# Patient Record
Sex: FEMALE | Race: WHITE | Employment: OTHER | URBAN - METROPOLITAN AREA
[De-identification: names, ages, dates, MRNs, and addresses within clinical notes are randomized per-mention and may not be internally consistent; named-entity substitution may affect disease eponyms.]

---

## 2017-01-17 DIAGNOSIS — G62.9 NEUROPATHY: Primary | ICD-10-CM

## 2017-01-17 RX ORDER — GABAPENTIN 600 MG/1
TABLET ORAL
Qty: 90 TABLET | Refills: 2 | Status: SHIPPED | OUTPATIENT
Start: 2017-01-17 | End: 2017-06-12

## 2017-01-17 NOTE — TELEPHONE ENCOUNTER
Medication: Gabapentin 600mg    Date of last refill: 9/29/2016 (#90/2)  [place]    Last office visit: 11/22/2016  Due back to clinic per last office note:  3-6 months  Date next office visit scheduled:  Future Appoin

## 2017-03-06 PROBLEM — I47.19 ATRIAL TACHYCARDIA (HCC): Status: ACTIVE | Noted: 2017-03-06

## 2017-03-06 PROBLEM — I47.1 ATRIAL TACHYCARDIA (HCC): Status: ACTIVE | Noted: 2017-03-06

## 2017-03-06 PROBLEM — I47.19 ATRIAL TACHYCARDIA: Status: ACTIVE | Noted: 2017-03-06

## 2017-03-10 ENCOUNTER — OFFICE VISIT (OUTPATIENT)
Dept: INTERNAL MEDICINE CLINIC | Facility: CLINIC | Age: 82
End: 2017-03-10

## 2017-03-10 VITALS
SYSTOLIC BLOOD PRESSURE: 132 MMHG | BODY MASS INDEX: 25.61 KG/M2 | TEMPERATURE: 98 F | DIASTOLIC BLOOD PRESSURE: 70 MMHG | HEIGHT: 68 IN | HEART RATE: 68 BPM | RESPIRATION RATE: 16 BRPM | OXYGEN SATURATION: 94 % | WEIGHT: 169 LBS

## 2017-03-10 DIAGNOSIS — E89.0 HISTORY OF PARTIAL THYROIDECTOMY: ICD-10-CM

## 2017-03-10 DIAGNOSIS — E53.8 B12 DEFICIENCY: ICD-10-CM

## 2017-03-10 DIAGNOSIS — I10 ESSENTIAL HYPERTENSION: ICD-10-CM

## 2017-03-10 DIAGNOSIS — M54.16 LUMBAR RADICULAR PAIN: ICD-10-CM

## 2017-03-10 DIAGNOSIS — G63 NEUROPATHY ASSOCIATED WITH LYMPHOMA (HCC): Primary | ICD-10-CM

## 2017-03-10 DIAGNOSIS — C85.90 NEUROPATHY ASSOCIATED WITH LYMPHOMA (HCC): Primary | ICD-10-CM

## 2017-03-10 DIAGNOSIS — F51.01 PRIMARY INSOMNIA: ICD-10-CM

## 2017-03-10 DIAGNOSIS — M75.121 COMPLETE TEAR OF RIGHT ROTATOR CUFF: ICD-10-CM

## 2017-03-10 DIAGNOSIS — R73.01 IFG (IMPAIRED FASTING GLUCOSE): ICD-10-CM

## 2017-03-10 DIAGNOSIS — M75.41 SHOULDER IMPINGEMENT, RIGHT: ICD-10-CM

## 2017-03-10 DIAGNOSIS — R63.4 WEIGHT LOSS, ABNORMAL: ICD-10-CM

## 2017-03-10 PROBLEM — I47.19 ATRIAL TACHYCARDIA (HCC): Status: RESOLVED | Noted: 2017-03-06 | Resolved: 2017-03-10

## 2017-03-10 PROBLEM — I47.1 ATRIAL TACHYCARDIA (HCC): Status: RESOLVED | Noted: 2017-03-06 | Resolved: 2017-03-10

## 2017-03-10 PROBLEM — I47.19 ATRIAL TACHYCARDIA: Status: RESOLVED | Noted: 2017-03-06 | Resolved: 2017-03-10

## 2017-03-10 LAB
ALBUMIN SERPL-MCNC: 4.1 G/DL (ref 3.5–4.8)
ALP LIVER SERPL-CCNC: 67 U/L (ref 55–142)
ALT SERPL-CCNC: 18 U/L (ref 14–54)
AST SERPL-CCNC: 20 U/L (ref 15–41)
BASOPHILS # BLD AUTO: 0.04 X10(3) UL (ref 0–0.1)
BASOPHILS NFR BLD AUTO: 0.6 %
BILIRUB SERPL-MCNC: 0.6 MG/DL (ref 0.1–2)
BUN BLD-MCNC: 28 MG/DL (ref 8–20)
C-REACTIVE PROTEIN: <0.29 MG/DL (ref ?–1)
CALCIUM BLD-MCNC: 9.2 MG/DL (ref 8.3–10.3)
CHLORIDE: 109 MMOL/L (ref 101–111)
CO2: 27 MMOL/L (ref 22–32)
CREAT BLD-MCNC: 1.1 MG/DL (ref 0.55–1.02)
EOSINOPHIL # BLD AUTO: 0.14 X10(3) UL (ref 0–0.3)
EOSINOPHIL NFR BLD AUTO: 2.1 %
ERYTHROCYTE [DISTWIDTH] IN BLOOD BY AUTOMATED COUNT: 13.1 % (ref 11.5–16)
EST. AVERAGE GLUCOSE BLD GHB EST-MCNC: 120 MG/DL (ref 68–126)
GLUCOSE BLD-MCNC: 106 MG/DL (ref 70–99)
HAV AB SERPL IA-ACNC: 417 PG/ML (ref 193–986)
HBA1C MFR BLD HPLC: 5.8 % (ref ?–5.7)
HCT VFR BLD AUTO: 42.3 % (ref 34–50)
HGB BLD-MCNC: 13.8 G/DL (ref 12–16)
IMMATURE GRANULOCYTE COUNT: 0.01 X10(3) UL (ref 0–1)
IMMATURE GRANULOCYTE RATIO %: 0.1 %
LYMPHOCYTES # BLD AUTO: 1.8 X10(3) UL (ref 0.9–4)
LYMPHOCYTES NFR BLD AUTO: 26.6 %
M PROTEIN MFR SERPL ELPH: 7.1 G/DL (ref 6.1–8.3)
MCH RBC QN AUTO: 30.3 PG (ref 27–33.2)
MCHC RBC AUTO-ENTMCNC: 32.6 G/DL (ref 31–37)
MCV RBC AUTO: 92.8 FL (ref 81–100)
MONOCYTES # BLD AUTO: 0.49 X10(3) UL (ref 0.1–0.6)
MONOCYTES NFR BLD AUTO: 7.2 %
NEUTROPHIL ABS PRELIM: 4.28 X10 (3) UL (ref 1.3–6.7)
NEUTROPHILS # BLD AUTO: 4.28 X10(3) UL (ref 1.3–6.7)
NEUTROPHILS NFR BLD AUTO: 63.4 %
PLATELET # BLD AUTO: 196 10(3)UL (ref 150–450)
POTASSIUM SERPL-SCNC: 4.6 MMOL/L (ref 3.6–5.1)
RBC # BLD AUTO: 4.56 X10(6)UL (ref 3.8–5.1)
RED CELL DISTRIBUTION WIDTH-SD: 44.1 FL (ref 35.1–46.3)
SED RATE-ML: 10 MM/HR (ref 0–25)
SODIUM SERPL-SCNC: 143 MMOL/L (ref 136–144)
TSI SER-ACNC: 0.79 MIU/ML (ref 0.35–5.5)
WBC # BLD AUTO: 6.8 X10(3) UL (ref 4–13)

## 2017-03-10 PROCEDURE — 84443 ASSAY THYROID STIM HORMONE: CPT | Performed by: INTERNAL MEDICINE

## 2017-03-10 PROCEDURE — 82607 VITAMIN B-12: CPT | Performed by: INTERNAL MEDICINE

## 2017-03-10 PROCEDURE — 83036 HEMOGLOBIN GLYCOSYLATED A1C: CPT | Performed by: INTERNAL MEDICINE

## 2017-03-10 PROCEDURE — 86140 C-REACTIVE PROTEIN: CPT | Performed by: INTERNAL MEDICINE

## 2017-03-10 PROCEDURE — 85025 COMPLETE CBC W/AUTO DIFF WBC: CPT | Performed by: INTERNAL MEDICINE

## 2017-03-10 PROCEDURE — G0439 PPPS, SUBSEQ VISIT: HCPCS | Performed by: INTERNAL MEDICINE

## 2017-03-10 PROCEDURE — 36415 COLL VENOUS BLD VENIPUNCTURE: CPT | Performed by: INTERNAL MEDICINE

## 2017-03-10 PROCEDURE — 80053 COMPREHEN METABOLIC PANEL: CPT | Performed by: INTERNAL MEDICINE

## 2017-03-10 PROCEDURE — 85652 RBC SED RATE AUTOMATED: CPT | Performed by: INTERNAL MEDICINE

## 2017-03-10 NOTE — PROGRESS NOTES
Ermias Cuevas is a 80year old female who presents for a Medicare Annual Wellness visit.         Patient Care Team: Patient Care Team:  Fei Souza MD as PCP - General (Internal Medicine)  Chantel Guthrie MD as Consulting Physician ([address] Date   HDL 66 05/26/2015   HDL 65 10/01/2014   HDL 69 01/08/2014       Lab Results  Component Value Date   TRIG 81 05/26/2015   TRIG 87 10/01/2014   TRIG 98 01/08/2014       Lab Results  Component Value Date   LDL 57 05/26/2015   LDL 56 10/01/2014   LDL 65 dressing or bathing?: No    Problems with daily activities? : No    Memory Problems?: No      Fall/Risk Assessment     Do you have 3 or more medical conditions?: 1-Yes    Have you fallen in the last 12 months?: 0-No    Do you accidently lose urine?: 0-No medically necessary     Colorectal Cancer Screening      Colonoscopy Screen every 10 years Colonoscopy,10 Years due on 07/09/1975 Update Health Maintenance if applicable    Flex Sigmoidoscopy Screen every 5 years No results found for this or any previous v 07/11/2016 1.11*   01/10/2014 1.16*    No flowsheet data found. Digoxin Serum Conc  Annually No results found for: DIGOXIN No flowsheet data found.     Diabetes      HgbA1C  Annually HGBA1C (%)   Date Value   12/12/2016 6.0*   08/08/2014 5.9*    No watson • HLD (hyperlipidemia) 2015   • Osteoarthritis           Past Surgical History    KNEE REPLACEMENT SURGERY      Comment left          Comment x3      Family History   Problem Relation Age of Onset   • Arthritis Mother       SOCIAL HISTO Oriented times three, cranial nerves are intact, motor and sensory are grossly intact    ASSESSMENT AND OTHER RELEVANT CHRONIC CONDITIONS:   Luis Gibbons is a 80year old female who presents for a Medicare Assessment.    Problem #1 neuropathy seconda counseling perfomed    SUGGESTED VACCINATIONS - Influenza, Pneumococcal, Zoster, Tetanus     Immunization History   Administered Date(s) Administered   • HIGH DOSE FLU 65 YRS AND OLDER PRSV FREE SINGLE D (59599) FLU CLINIC 10/21/2014, 10/20/2015, 10/13/201

## 2017-03-10 NOTE — PATIENT INSTRUCTIONS
Current Outpatient Prescriptions on File Prior to Visit:  lisinopril 10 MG Oral Tab Take 1 tablet (10 mg total) by mouth once daily. Disp: 90 tablet Rfl: 3   GABAPENTIN 600 MG Oral Tab TAKE 1 TABLET (600 MG TOTAL) BY MOUTH 3 (THREE) TIMES DAILY.  Disp: 90 for this or any previous visit. No flowsheet data found. Fecal Occult Blood Annually No results found for: FOB, OCCULTSTOOL No flowsheet data found.     Glaucoma Screening      Ophthalmology Visit Annually yes    Bone Density Screening      Marianna Madison

## 2017-03-27 RX ORDER — DILTIAZEM HYDROCHLORIDE 120 MG/1
CAPSULE, COATED, EXTENDED RELEASE ORAL
Qty: 90 CAPSULE | Refills: 3 | Status: SHIPPED | OUTPATIENT
Start: 2017-03-27 | End: 2018-04-17

## 2017-03-28 ENCOUNTER — TELEPHONE (OUTPATIENT)
Dept: INTERNAL MEDICINE CLINIC | Facility: CLINIC | Age: 82
End: 2017-03-28

## 2017-04-11 ENCOUNTER — OFFICE VISIT (OUTPATIENT)
Dept: INTERNAL MEDICINE CLINIC | Facility: CLINIC | Age: 82
End: 2017-04-11

## 2017-04-11 VITALS
OXYGEN SATURATION: 94 % | DIASTOLIC BLOOD PRESSURE: 70 MMHG | BODY MASS INDEX: 26 KG/M2 | SYSTOLIC BLOOD PRESSURE: 120 MMHG | RESPIRATION RATE: 16 BRPM | WEIGHT: 171.5 LBS | HEART RATE: 62 BPM

## 2017-04-11 DIAGNOSIS — R63.4 WEIGHT LOSS: Primary | ICD-10-CM

## 2017-04-11 PROCEDURE — 99213 OFFICE O/P EST LOW 20 MIN: CPT | Performed by: INTERNAL MEDICINE

## 2017-04-11 NOTE — PROGRESS NOTES
This a follow-up for weight loss. Patient has no complaints. Appetite is good bowel movements normal.  Patient has gained 2 pounds since her last visit about a month ago. All blood work was negative.   On examination she is pleasant alert well orientated

## 2017-06-12 DIAGNOSIS — G63 NEUROPATHY ASSOCIATED WITH LYMPHOMA (HCC): Primary | ICD-10-CM

## 2017-06-12 DIAGNOSIS — C85.90 NEUROPATHY ASSOCIATED WITH LYMPHOMA (HCC): Primary | ICD-10-CM

## 2017-06-12 RX ORDER — GABAPENTIN 600 MG/1
TABLET ORAL
Qty: 90 TABLET | Refills: 0 | Status: SHIPPED | OUTPATIENT
Start: 2017-06-12 | End: 2017-08-07

## 2017-06-12 NOTE — TELEPHONE ENCOUNTER
Medication: Gabapentin    Date of last refill: 1/17/17  [place]    Last office visit: 11/22/16  Due back to clinic per last office note:  3-6 months  Date next office visit scheduled:  None Scheduled.     Last OV note

## 2017-07-07 ENCOUNTER — OFFICE VISIT (OUTPATIENT)
Dept: INTERNAL MEDICINE CLINIC | Facility: CLINIC | Age: 82
End: 2017-07-07

## 2017-07-07 VITALS
SYSTOLIC BLOOD PRESSURE: 126 MMHG | WEIGHT: 172.31 LBS | RESPIRATION RATE: 18 BRPM | DIASTOLIC BLOOD PRESSURE: 64 MMHG | HEART RATE: 64 BPM | BODY MASS INDEX: 26 KG/M2 | OXYGEN SATURATION: 94 % | TEMPERATURE: 97 F

## 2017-07-07 DIAGNOSIS — I10 ESSENTIAL HYPERTENSION: ICD-10-CM

## 2017-07-07 DIAGNOSIS — R73.01 IFG (IMPAIRED FASTING GLUCOSE): Primary | ICD-10-CM

## 2017-07-07 DIAGNOSIS — R63.4 WEIGHT LOSS: ICD-10-CM

## 2017-07-07 LAB
EST. AVERAGE GLUCOSE BLD GHB EST-MCNC: 131 MG/DL (ref 68–126)
HBA1C MFR BLD HPLC: 6.2 % (ref ?–5.7)

## 2017-07-07 PROCEDURE — 83036 HEMOGLOBIN GLYCOSYLATED A1C: CPT | Performed by: INTERNAL MEDICINE

## 2017-07-07 PROCEDURE — 99214 OFFICE O/P EST MOD 30 MIN: CPT | Performed by: INTERNAL MEDICINE

## 2017-07-07 PROCEDURE — 36415 COLL VENOUS BLD VENIPUNCTURE: CPT | Performed by: INTERNAL MEDICINE

## 2017-07-07 NOTE — PATIENT INSTRUCTIONS
Jessica if you continue to lose weight make an appointment to see me if not I will see you in 3 months

## 2017-07-07 NOTE — PROGRESS NOTES
Problem #1 impaired fasting glucose patient is up-to-date on eye exam.  Does not check home blood sugars. Denies polyuria polydipsia. Problem #2 hypertension denies headaches dizziness chest pain or shortness of breath problem #3 weight loss.   Patient st

## 2017-07-10 NOTE — ADDENDUM NOTE
Encounter addended by: Rowena Dasilva on: 7/10/2017 11:29 AM<BR>    Actions taken: Letter status changed

## 2017-07-31 DIAGNOSIS — G63 NEUROPATHY ASSOCIATED WITH LYMPHOMA (HCC): ICD-10-CM

## 2017-07-31 DIAGNOSIS — C85.90 NEUROPATHY ASSOCIATED WITH LYMPHOMA (HCC): ICD-10-CM

## 2017-07-31 RX ORDER — GABAPENTIN 600 MG/1
TABLET ORAL
Qty: 90 TABLET | OUTPATIENT
Start: 2017-07-31

## 2017-07-31 NOTE — TELEPHONE ENCOUNTER
Spoke to patient and inform her that she is overdue for an appointment with Dr. Celestine Irizarry.  Patient was transfer over to the  to make appointment    Medication: Gabapentin 600mg     Date of last refill: 6/12/17  [place]

## 2017-07-31 NOTE — TELEPHONE ENCOUNTER
Patient made appointment on 8/7/17 and stated that she has enough medication to get her to her appointment.

## 2017-08-07 ENCOUNTER — OFFICE VISIT (OUTPATIENT)
Dept: NEUROLOGY | Facility: CLINIC | Age: 82
End: 2017-08-07

## 2017-08-07 VITALS
SYSTOLIC BLOOD PRESSURE: 156 MMHG | WEIGHT: 171 LBS | HEART RATE: 78 BPM | BODY MASS INDEX: 26.84 KG/M2 | DIASTOLIC BLOOD PRESSURE: 76 MMHG | RESPIRATION RATE: 18 BRPM | HEIGHT: 67 IN

## 2017-08-07 DIAGNOSIS — E11.42 DIABETIC POLYNEUROPATHY ASSOCIATED WITH TYPE 2 DIABETES MELLITUS (HCC): Primary | ICD-10-CM

## 2017-08-07 PROCEDURE — 99213 OFFICE O/P EST LOW 20 MIN: CPT | Performed by: OTHER

## 2017-08-07 RX ORDER — GABAPENTIN 600 MG/1
TABLET ORAL
Qty: 60 TABLET | Refills: 5 | Status: SHIPPED | OUTPATIENT
Start: 2017-08-07 | End: 2017-10-06

## 2017-08-07 NOTE — PATIENT INSTRUCTIONS
Refill policies:    • Allow 2-3 business days for refills; controlled substances may take longer.   • Contact your pharmacy at least 5 days prior to running out of medication and have them send an electronic request or submit request through the [facility] have a procedure or additional testing performed. [facility]) will contact your insurance carrier to obtain pre-certification or prior authorization.     Unfortunately, [facility] has seen an increase in denial of payment even though the p

## 2017-08-07 NOTE — PROGRESS NOTES
HPI:    Patient ID: Art General is a 80year old female. HPI  Ms Al Delacruz is a 80year old female who presented for follow up for diabetic peripheral neuropathy. She is doing well except balance is getting worse.  She is presently taking Gabapen amoxicillin 500 MG Oral Cap For dental work Disp:  Rfl:    aspirin 81 MG Oral Tab Take 81 mg by mouth daily. Disp:  Rfl:    Cholecalciferol (VITAMIN D) 2000 UNITS Oral Cap Take  by mouth daily.  Disp:  Rfl:    CUSTOM MEDICATION Lidocaine 2%, Prilocaine 2% Prescriptions Disp Refills    gabapentin 600 MG Oral Tab 60 tablet 5      Sig: Take half tab in am and noon and 1 full tab at bedtime           Imaging & Referrals:  None       AF#[id number]

## 2017-08-21 ENCOUNTER — OFFICE VISIT (OUTPATIENT)
Dept: INTERNAL MEDICINE CLINIC | Facility: CLINIC | Age: 82
End: 2017-08-21

## 2017-08-21 ENCOUNTER — PRIOR ORIGINAL RECORDS (OUTPATIENT)
Dept: OTHER | Age: 82
End: 2017-08-21

## 2017-08-21 VITALS
RESPIRATION RATE: 16 BRPM | BODY MASS INDEX: 27 KG/M2 | OXYGEN SATURATION: 94 % | WEIGHT: 170 LBS | DIASTOLIC BLOOD PRESSURE: 64 MMHG | SYSTOLIC BLOOD PRESSURE: 130 MMHG | HEART RATE: 72 BPM

## 2017-08-21 DIAGNOSIS — R55 SYNCOPE, UNSPECIFIED SYNCOPE TYPE: Primary | ICD-10-CM

## 2017-08-21 LAB
ALBUMIN SERPL-MCNC: 3.8 G/DL (ref 3.5–4.8)
ALP LIVER SERPL-CCNC: 70 U/L (ref 55–142)
ALT SERPL-CCNC: 21 U/L (ref 14–54)
AST SERPL-CCNC: 16 U/L (ref 15–41)
BASOPHILS # BLD AUTO: 0.03 X10(3) UL (ref 0–0.1)
BASOPHILS NFR BLD AUTO: 0.4 %
BILIRUB SERPL-MCNC: 0.5 MG/DL (ref 0.1–2)
BUN BLD-MCNC: 30 MG/DL (ref 8–20)
CALCIUM BLD-MCNC: 9.1 MG/DL (ref 8.3–10.3)
CHLORIDE: 109 MMOL/L (ref 101–111)
CO2: 29 MMOL/L (ref 22–32)
CREAT BLD-MCNC: 1.19 MG/DL (ref 0.55–1.02)
EOSINOPHIL # BLD AUTO: 0.08 X10(3) UL (ref 0–0.3)
EOSINOPHIL NFR BLD AUTO: 1.2 %
ERYTHROCYTE [DISTWIDTH] IN BLOOD BY AUTOMATED COUNT: 13.7 % (ref 11.5–16)
GLUCOSE BLD-MCNC: 101 MG/DL (ref 70–99)
HCT VFR BLD AUTO: 40.6 % (ref 34–50)
HGB BLD-MCNC: 12.8 G/DL (ref 12–16)
IMMATURE GRANULOCYTE COUNT: 0.02 X10(3) UL (ref 0–1)
IMMATURE GRANULOCYTE RATIO %: 0.3 %
LYMPHOCYTES # BLD AUTO: 1.72 X10(3) UL (ref 0.9–4)
LYMPHOCYTES NFR BLD AUTO: 25 %
M PROTEIN MFR SERPL ELPH: 7.2 G/DL (ref 6.1–8.3)
MCH RBC QN AUTO: 29.8 PG (ref 27–33.2)
MCHC RBC AUTO-ENTMCNC: 31.5 G/DL (ref 31–37)
MCV RBC AUTO: 94.6 FL (ref 81–100)
MONOCYTES # BLD AUTO: 0.4 X10(3) UL (ref 0.1–0.6)
MONOCYTES NFR BLD AUTO: 5.8 %
NEUTROPHIL ABS PRELIM: 4.63 X10 (3) UL (ref 1.3–6.7)
NEUTROPHILS # BLD AUTO: 4.63 X10(3) UL (ref 1.3–6.7)
NEUTROPHILS NFR BLD AUTO: 67.3 %
PLATELET # BLD AUTO: 218 10(3)UL (ref 150–450)
POTASSIUM SERPL-SCNC: 4.6 MMOL/L (ref 3.6–5.1)
RBC # BLD AUTO: 4.29 X10(6)UL (ref 3.8–5.1)
RED CELL DISTRIBUTION WIDTH-SD: 47 FL (ref 35.1–46.3)
SODIUM SERPL-SCNC: 144 MMOL/L (ref 136–144)
WBC # BLD AUTO: 6.9 X10(3) UL (ref 4–13)

## 2017-08-21 PROCEDURE — 80053 COMPREHEN METABOLIC PANEL: CPT | Performed by: INTERNAL MEDICINE

## 2017-08-21 PROCEDURE — 99214 OFFICE O/P EST MOD 30 MIN: CPT | Performed by: INTERNAL MEDICINE

## 2017-08-21 PROCEDURE — 93000 ELECTROCARDIOGRAM COMPLETE: CPT | Performed by: INTERNAL MEDICINE

## 2017-08-21 PROCEDURE — 36415 COLL VENOUS BLD VENIPUNCTURE: CPT | Performed by: INTERNAL MEDICINE

## 2017-08-21 PROCEDURE — 85025 COMPLETE CBC W/AUTO DIFF WBC: CPT | Performed by: INTERNAL MEDICINE

## 2017-08-21 NOTE — PROGRESS NOTES
3 days ago while at a bridal shower patient apparently has syncopal episode. There were nurses at the shower. Apparently the patient was unconscious and lips turned blue. All she remembers is someone slapping her.   When she woke up the EMTs were called

## 2017-08-24 ENCOUNTER — TELEPHONE (OUTPATIENT)
Dept: INTERNAL MEDICINE CLINIC | Facility: CLINIC | Age: 82
End: 2017-08-24

## 2017-10-03 ENCOUNTER — IMMUNIZATION (OUTPATIENT)
Dept: INTERNAL MEDICINE CLINIC | Facility: CLINIC | Age: 82
End: 2017-10-03

## 2017-10-03 PROCEDURE — 90653 IIV ADJUVANT VACCINE IM: CPT | Performed by: INTERNAL MEDICINE

## 2017-10-03 PROCEDURE — G0008 ADMIN INFLUENZA VIRUS VAC: HCPCS | Performed by: INTERNAL MEDICINE

## 2017-10-06 ENCOUNTER — PRIOR ORIGINAL RECORDS (OUTPATIENT)
Dept: OTHER | Age: 82
End: 2017-10-06

## 2017-10-06 ENCOUNTER — OFFICE VISIT (OUTPATIENT)
Dept: INTERNAL MEDICINE CLINIC | Facility: CLINIC | Age: 82
End: 2017-10-06

## 2017-10-06 VITALS
TEMPERATURE: 97 F | HEART RATE: 60 BPM | DIASTOLIC BLOOD PRESSURE: 75 MMHG | SYSTOLIC BLOOD PRESSURE: 135 MMHG | RESPIRATION RATE: 18 BRPM | WEIGHT: 168.69 LBS | BODY MASS INDEX: 26 KG/M2 | OXYGEN SATURATION: 95 %

## 2017-10-06 DIAGNOSIS — R73.01 IFG (IMPAIRED FASTING GLUCOSE): ICD-10-CM

## 2017-10-06 DIAGNOSIS — R63.4 WEIGHT LOSS: Primary | ICD-10-CM

## 2017-10-06 DIAGNOSIS — G62.9 NEUROPATHY: ICD-10-CM

## 2017-10-06 PROCEDURE — 36415 COLL VENOUS BLD VENIPUNCTURE: CPT | Performed by: INTERNAL MEDICINE

## 2017-10-06 PROCEDURE — 83036 HEMOGLOBIN GLYCOSYLATED A1C: CPT | Performed by: INTERNAL MEDICINE

## 2017-10-06 PROCEDURE — 99214 OFFICE O/P EST MOD 30 MIN: CPT | Performed by: INTERNAL MEDICINE

## 2017-10-06 RX ORDER — GABAPENTIN 600 MG/1
TABLET ORAL
Qty: 60 TABLET | Refills: 5 | Status: SHIPPED | OUTPATIENT
Start: 2017-10-06 | End: 2017-11-07

## 2017-10-06 NOTE — PATIENT INSTRUCTIONS
Take a half a tablet of gabapentin morning and afternoon. Take one half tablet in the evening.   Make an appointment to see me in 1 month

## 2017-10-06 NOTE — PROGRESS NOTES
Patient is here with her  Allie Kennedy. States appetite is good. Bowel movements normal.  Weight is stable. She has not seen her eye doctor in the last 12 months. She has been encouraged to do so. She does have impaired fasting glucose.   Does not chec

## 2017-10-17 ENCOUNTER — TELEPHONE (OUTPATIENT)
Dept: INTERNAL MEDICINE CLINIC | Facility: CLINIC | Age: 82
End: 2017-10-17

## 2017-10-17 DIAGNOSIS — Z12.39 SCREENING FOR BREAST CANCER: Primary | ICD-10-CM

## 2017-10-24 ENCOUNTER — TELEPHONE (OUTPATIENT)
Dept: INTERNAL MEDICINE CLINIC | Facility: CLINIC | Age: 82
End: 2017-10-24

## 2017-10-24 DIAGNOSIS — Z12.31 ENCOUNTER FOR SCREENING MAMMOGRAM FOR BREAST CANCER: Primary | ICD-10-CM

## 2017-11-07 PROBLEM — M15.9 GENERALIZED OA: Status: ACTIVE | Noted: 2017-11-07

## 2017-11-07 NOTE — PROGRESS NOTES
Problem #1 neuropathy. This is secondary to treatment for lymphoma. She takes half a gabapentin morning and afternoon and one half in the evening. Most of her problems are in the evening. We did recommend to increase evening dose to 2 tablets.   And zuleyma

## 2017-11-22 ENCOUNTER — OFFICE VISIT (OUTPATIENT)
Dept: INTERNAL MEDICINE CLINIC | Facility: CLINIC | Age: 82
End: 2017-11-22

## 2017-11-22 VITALS
OXYGEN SATURATION: 93 % | HEART RATE: 56 BPM | RESPIRATION RATE: 18 BRPM | BODY MASS INDEX: 27 KG/M2 | DIASTOLIC BLOOD PRESSURE: 60 MMHG | SYSTOLIC BLOOD PRESSURE: 120 MMHG | WEIGHT: 170.81 LBS | TEMPERATURE: 97 F

## 2017-11-22 DIAGNOSIS — G62.9 NEUROPATHY: Primary | ICD-10-CM

## 2017-11-22 DIAGNOSIS — G47.00 INSOMNIA, UNSPECIFIED TYPE: ICD-10-CM

## 2017-11-22 PROCEDURE — 99213 OFFICE O/P EST LOW 20 MIN: CPT | Performed by: INTERNAL MEDICINE

## 2017-11-22 RX ORDER — IBUPROFEN 200 MG
200 TABLET ORAL DAILY
COMMUNITY

## 2017-11-22 NOTE — PROGRESS NOTES
Jessica as neuropathy. She has been on gabapentin with somewhat control. Still causing some problems with her sleeping. She states now she takes ibuprofen at night and this is allows her to sleep.   On examination she is pleasant alert well orientated no

## 2017-11-22 NOTE — PATIENT INSTRUCTIONS
Jessica, any signs of indigestion make an appointment to see me ASA. Make sure when you take your ibuprofen you have a snack with that and also drink a tall glass of water and take her omeprazole at this will help prevent any indigestion.

## 2017-12-27 DIAGNOSIS — M54.16 LUMBAR RADICULAR PAIN: ICD-10-CM

## 2017-12-28 RX ORDER — NICOTINE POLACRILEX 4 MG/1
1 GUM, CHEWING ORAL DAILY
Qty: 90 TABLET | Refills: 3 | Status: SHIPPED | OUTPATIENT
Start: 2017-12-28 | End: 2019-03-20

## 2018-03-02 ENCOUNTER — PRIOR ORIGINAL RECORDS (OUTPATIENT)
Dept: OTHER | Age: 83
End: 2018-03-02

## 2018-03-08 LAB
ALBUMIN: 3.8 G/DL
ALKALINE PHOSPHATATE(ALK PHOS): 70 IU/L
BILIRUBIN TOTAL: 0.5 MG/DL
BUN: 30 MG/DL
CALCIUM: 9.1 MG/DL
CHLORIDE: 109 MEQ/L
CREATININE, SERUM: 1.19 MG/DL
GLUCOSE: 101 MG/DL
HEMATOCRIT: 40.6 %
HEMOGLOBIN A1C: 6 %
HEMOGLOBIN: 12.8 G/DL
PLATELETS: 218 K/UL
POTASSIUM, SERUM: 4.6 MEQ/L
PROTEIN, TOTAL: 7.2 G/DL
RED BLOOD COUNT: 4.29 X 10-6/U
SGOT (AST): 16 IU/L
SGPT (ALT): 21 IU/L
SODIUM: 144 MEQ/L
WHITE BLOOD COUNT: 6.9 X 10-3/U

## 2018-04-17 ENCOUNTER — OFFICE VISIT (OUTPATIENT)
Dept: INTERNAL MEDICINE CLINIC | Facility: CLINIC | Age: 83
End: 2018-04-17

## 2018-04-17 VITALS
RESPIRATION RATE: 16 BRPM | BODY MASS INDEX: 27.49 KG/M2 | SYSTOLIC BLOOD PRESSURE: 120 MMHG | OXYGEN SATURATION: 95 % | DIASTOLIC BLOOD PRESSURE: 52 MMHG | HEIGHT: 65.59 IN | HEART RATE: 57 BPM | WEIGHT: 169 LBS

## 2018-04-17 DIAGNOSIS — Z00.00 ENCOUNTER FOR ANNUAL HEALTH EXAMINATION: ICD-10-CM

## 2018-04-17 DIAGNOSIS — E89.0 HISTORY OF PARTIAL THYROIDECTOMY: ICD-10-CM

## 2018-04-17 DIAGNOSIS — R73.01 IFG (IMPAIRED FASTING GLUCOSE): ICD-10-CM

## 2018-04-17 DIAGNOSIS — C85.90 NEUROPATHY ASSOCIATED WITH LYMPHOMA (HCC): ICD-10-CM

## 2018-04-17 DIAGNOSIS — E53.8 B12 DEFICIENCY: ICD-10-CM

## 2018-04-17 DIAGNOSIS — M15.9 GENERALIZED OA: Primary | ICD-10-CM

## 2018-04-17 DIAGNOSIS — M75.121 COMPLETE TEAR OF RIGHT ROTATOR CUFF: ICD-10-CM

## 2018-04-17 DIAGNOSIS — G63 NEUROPATHY ASSOCIATED WITH LYMPHOMA (HCC): ICD-10-CM

## 2018-04-17 DIAGNOSIS — I10 ESSENTIAL HYPERTENSION: ICD-10-CM

## 2018-04-17 DIAGNOSIS — G47.00 INSOMNIA, UNSPECIFIED TYPE: ICD-10-CM

## 2018-04-17 PROBLEM — R55 SYNCOPE: Status: RESOLVED | Noted: 2017-08-21 | Resolved: 2018-04-17

## 2018-04-17 PROCEDURE — G0439 PPPS, SUBSEQ VISIT: HCPCS | Performed by: INTERNAL MEDICINE

## 2018-04-17 PROCEDURE — 82607 VITAMIN B-12: CPT | Performed by: INTERNAL MEDICINE

## 2018-04-17 PROCEDURE — 83036 HEMOGLOBIN GLYCOSYLATED A1C: CPT | Performed by: INTERNAL MEDICINE

## 2018-04-17 PROCEDURE — 36415 COLL VENOUS BLD VENIPUNCTURE: CPT | Performed by: INTERNAL MEDICINE

## 2018-04-17 PROCEDURE — 80053 COMPREHEN METABOLIC PANEL: CPT | Performed by: INTERNAL MEDICINE

## 2018-04-17 PROCEDURE — 85025 COMPLETE CBC W/AUTO DIFF WBC: CPT | Performed by: INTERNAL MEDICINE

## 2018-04-17 RX ORDER — DILTIAZEM HYDROCHLORIDE 120 MG/1
CAPSULE, COATED, EXTENDED RELEASE ORAL
Qty: 90 CAPSULE | Refills: 3 | Status: SHIPPED | OUTPATIENT
Start: 2018-04-17 | End: 2019-04-18

## 2018-04-17 RX ORDER — GABAPENTIN 600 MG/1
TABLET ORAL
Qty: 360 TABLET | Refills: 3 | Status: SHIPPED | OUTPATIENT
Start: 2018-04-17 | End: 2019-04-20

## 2018-04-17 NOTE — PATIENT INSTRUCTIONS
Luana Kraus's SCREENING SCHEDULE   Tests on this list are recommended by your physician but may not be covered, or covered at this frequency, by your insurer. Please check with your insurance carrier before scheduling to verify coverage.    PREVEN or any previous visit.  Limited to patients who meet one of the following criteria:   • Men who are 73-68 years old and have smoked more than 100 cigarettes in their lifetime   • Anyone with a family history    Colorectal Cancer Screening  Covered up to Age There are no preventive care reminders to display for this patient.  Please get this Mammogram regularly   Immunizations      Influenza  Covered Annually   Orders placed or performed in visit on 10/13/16  -FLU VACC PRSV FREE INC ANTIG   Orders placed or per Society website. http://[email or web address] [place]ph.state. [place]/public/books/advin.htm  A link to the [employer]. This site has a lot of good information including definitions of the different types of Advance Directives.  It also has the State forms a

## 2018-04-17 NOTE — PROGRESS NOTES
HPI:   Joan Montez is a 80year old female who presents for a Medicare Subsequent Annual Wellness visit (Pt already had Initial Annual Wellness).     No new C/O  Her last annual assessment has been over 1 year: Annual Physical due on 03/10/2018 (impaired fasting glucose)     Insomnia     Essential hypertension     Neuropathy associated with lymphoma ([address]     History of partial thyroidectomy r lobe     Complete tear of right rotator cuff     Generalized OA    Wt Readings from Last 3 Encounters: hypertension. She  has a past surgical history that includes knee replacement surgery () and . Her family history includes Arthritis in her mother. SOCIAL HISTORY:   She  reports that she has never smoked.  She has never used smokeless normal, no CVA tenderness   Lungs:   Clear to auscultation bilaterally, respirations unlabored   Heart:  Regular rate and rhythm, S1 and S2 normal, no murmur, rub, or gallop   Abdomen:   Soft, non-tender, bowel sounds active all four quadrants,  no masses, problem #6 neuropathy increase gabapentin to a full tablet morning afternoon and 2 tablets in the evening.   Then return office for reevaluation 1 month problem #7 history of partial thyroidectomy stable problem #8 complete tear right rotator cuff unchanged Occult Blood Annually No results found for: FOB No flowsheet data found.     Glaucoma Screening      Ophthalmology Visit Annually: Diabetics, FHx Glaucoma, AA>50, > 65 Data entered on: 3/25/2015   Last Dilated Eye Exam 3/19/2015       Bone Density S Internal Lab or Procedure External Lab or Procedure      Annual Monitoring of Persistent     Medications (ACE/ARB, digoxin diuretics, anticonvulsants.)    Potassium  Annually Potassium (mmol/L)   Date Value   08/21/2017 4.6     POTASSIUM (mmol/L)   Date [place]

## 2018-09-07 ENCOUNTER — MYAURORA ACCOUNT LINK (OUTPATIENT)
Dept: OTHER | Age: 83
End: 2018-09-07

## 2018-09-07 ENCOUNTER — PRIOR ORIGINAL RECORDS (OUTPATIENT)
Dept: OTHER | Age: 83
End: 2018-09-07

## 2018-09-20 ENCOUNTER — MED REC SCAN ONLY (OUTPATIENT)
Dept: INTERNAL MEDICINE CLINIC | Facility: CLINIC | Age: 83
End: 2018-09-20

## 2018-10-04 ENCOUNTER — IMMUNIZATION (OUTPATIENT)
Dept: INTERNAL MEDICINE CLINIC | Facility: CLINIC | Age: 83
End: 2018-10-04
Payer: MEDICARE

## 2018-10-04 PROCEDURE — G0008 ADMIN INFLUENZA VIRUS VAC: HCPCS | Performed by: INTERNAL MEDICINE

## 2018-10-04 PROCEDURE — 90653 IIV ADJUVANT VACCINE IM: CPT | Performed by: INTERNAL MEDICINE

## 2018-10-12 ENCOUNTER — TELEPHONE (OUTPATIENT)
Dept: INTERNAL MEDICINE CLINIC | Facility: CLINIC | Age: 83
End: 2018-10-12

## 2018-10-12 DIAGNOSIS — Z12.39 SCREENING FOR BREAST CANCER: Primary | ICD-10-CM

## 2018-12-24 ENCOUNTER — OFFICE VISIT (OUTPATIENT)
Dept: INTERNAL MEDICINE CLINIC | Facility: CLINIC | Age: 83
End: 2018-12-24
Payer: MEDICARE

## 2018-12-24 VITALS
RESPIRATION RATE: 16 BRPM | WEIGHT: 169 LBS | BODY MASS INDEX: 28 KG/M2 | OXYGEN SATURATION: 96 % | DIASTOLIC BLOOD PRESSURE: 64 MMHG | SYSTOLIC BLOOD PRESSURE: 138 MMHG | HEART RATE: 67 BPM | TEMPERATURE: 97 F

## 2018-12-24 DIAGNOSIS — R73.01 IFG (IMPAIRED FASTING GLUCOSE): ICD-10-CM

## 2018-12-24 DIAGNOSIS — I10 ESSENTIAL HYPERTENSION: Primary | ICD-10-CM

## 2018-12-24 PROCEDURE — 85025 COMPLETE CBC W/AUTO DIFF WBC: CPT | Performed by: INTERNAL MEDICINE

## 2018-12-24 PROCEDURE — 83036 HEMOGLOBIN GLYCOSYLATED A1C: CPT | Performed by: INTERNAL MEDICINE

## 2018-12-24 PROCEDURE — 99214 OFFICE O/P EST MOD 30 MIN: CPT | Performed by: INTERNAL MEDICINE

## 2018-12-24 PROCEDURE — 36415 COLL VENOUS BLD VENIPUNCTURE: CPT | Performed by: INTERNAL MEDICINE

## 2018-12-24 PROCEDURE — 80053 COMPREHEN METABOLIC PANEL: CPT | Performed by: INTERNAL MEDICINE

## 2018-12-24 NOTE — PROGRESS NOTES
Tonja Lawson is a somewhat stressful.  is at the [facility] wants to come home however this would be very very difficult. We did leave a message with the  to have a conversation with her.   We did strongly encourage her that she is doing the ri

## 2019-02-28 VITALS
HEIGHT: 69 IN | HEART RATE: 84 BPM | DIASTOLIC BLOOD PRESSURE: 80 MMHG | WEIGHT: 168 LBS | BODY MASS INDEX: 24.88 KG/M2 | SYSTOLIC BLOOD PRESSURE: 120 MMHG

## 2019-02-28 VITALS
HEIGHT: 69 IN | HEART RATE: 74 BPM | BODY MASS INDEX: 24.73 KG/M2 | SYSTOLIC BLOOD PRESSURE: 132 MMHG | WEIGHT: 167 LBS | DIASTOLIC BLOOD PRESSURE: 72 MMHG

## 2019-03-05 ENCOUNTER — TELEPHONE (OUTPATIENT)
Dept: INTERNAL MEDICINE CLINIC | Facility: CLINIC | Age: 84
End: 2019-03-05

## 2019-03-05 NOTE — TELEPHONE ENCOUNTER
Appt offered to pt for incident 3/2/19 where knee gave out in [address]. Pt refuses FU appt stating nothing more needed.

## 2019-03-12 PROCEDURE — 87086 URINE CULTURE/COLONY COUNT: CPT | Performed by: INTERNAL MEDICINE

## 2019-03-12 NOTE — PROGRESS NOTES
Patient comes in on her complaining of dysuria. She is very emotional crying. Her  is at the [address] [place] [address]. He wants to come back to independent living. Encounter the situation in the past.  Woodrow Simmons is conflicted.   In his best interest.  However he ochoa

## 2019-03-19 ENCOUNTER — OFFICE VISIT (OUTPATIENT)
Dept: INTERNAL MEDICINE CLINIC | Facility: CLINIC | Age: 84
End: 2019-03-19
Payer: MEDICARE

## 2019-03-19 VITALS
WEIGHT: 172.19 LBS | SYSTOLIC BLOOD PRESSURE: 104 MMHG | HEART RATE: 59 BPM | RESPIRATION RATE: 16 BRPM | BODY MASS INDEX: 28 KG/M2 | OXYGEN SATURATION: 94 % | TEMPERATURE: 99 F | DIASTOLIC BLOOD PRESSURE: 50 MMHG

## 2019-03-19 DIAGNOSIS — F32.A DEPRESSION, UNSPECIFIED DEPRESSION TYPE: Primary | ICD-10-CM

## 2019-03-19 DIAGNOSIS — M25.531 RIGHT WRIST PAIN: ICD-10-CM

## 2019-03-19 DIAGNOSIS — M25.411 PAIN AND SWELLING OF SHOULDER, RIGHT: ICD-10-CM

## 2019-03-19 DIAGNOSIS — M25.511 PAIN AND SWELLING OF SHOULDER, RIGHT: ICD-10-CM

## 2019-03-19 PROCEDURE — 99214 OFFICE O/P EST MOD 30 MIN: CPT | Performed by: INTERNAL MEDICINE

## 2019-03-19 NOTE — PROGRESS NOTES
Problem #1 depressed. Her  is at the [address] [place] [address] and has many issues that would make it difficult for him to go back to independent living. Is with her.   We have referred her out for counseling however her insurance is not covered by our psychologist.

## 2019-03-19 NOTE — PROGRESS NOTES
gave a referral for psychology and physical therapy for patient. Found out today that patient's insurance is not accepted by .  Rm Thompson spoke with patient about contacting a family member and have them call her insurance to get someone in [place]

## 2019-03-20 DIAGNOSIS — M54.16 LUMBAR RADICULAR PAIN: ICD-10-CM

## 2019-03-20 RX ORDER — NICOTINE POLACRILEX 4 MG/1
1 GUM, CHEWING ORAL DAILY
Qty: 90 TABLET | Refills: 3 | Status: SHIPPED | OUTPATIENT
Start: 2019-03-20 | End: 2019-04-18

## 2019-03-25 ENCOUNTER — MED REC SCAN ONLY (OUTPATIENT)
Dept: INTERNAL MEDICINE CLINIC | Facility: CLINIC | Age: 84
End: 2019-03-25

## 2019-04-01 ENCOUNTER — OFFICE VISIT (OUTPATIENT)
Dept: INTERNAL MEDICINE CLINIC | Facility: CLINIC | Age: 84
End: 2019-04-01
Payer: MEDICARE

## 2019-04-01 VITALS
SYSTOLIC BLOOD PRESSURE: 114 MMHG | RESPIRATION RATE: 16 BRPM | WEIGHT: 174.5 LBS | HEART RATE: 72 BPM | OXYGEN SATURATION: 94 % | DIASTOLIC BLOOD PRESSURE: 50 MMHG | BODY MASS INDEX: 29 KG/M2

## 2019-04-01 DIAGNOSIS — F32.A DEPRESSION, UNSPECIFIED DEPRESSION TYPE: Primary | ICD-10-CM

## 2019-04-01 PROCEDURE — 99213 OFFICE O/P EST LOW 20 MIN: CPT | Performed by: INTERNAL MEDICINE

## 2019-04-01 NOTE — PROGRESS NOTES
Patient started on an antidepressant. Comes in today much better mood. Prior to this there was a lot of crying going on. 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   Cony Timmons also tells me she is handling the c

## 2019-04-16 RX ORDER — DILTIAZEM HYDROCHLORIDE 120 MG/1
TABLET, FILM COATED ORAL
COMMUNITY
Start: 2013-10-09 | End: 2019-09-17

## 2019-04-16 RX ORDER — IBUPROFEN 200 MG
TABLET ORAL
COMMUNITY
Start: 2018-03-02

## 2019-04-16 RX ORDER — ATORVASTATIN CALCIUM 20 MG/1
TABLET, FILM COATED ORAL
COMMUNITY
Start: 2013-11-11 | End: 2019-09-17

## 2019-04-16 RX ORDER — GABAPENTIN 800 MG/1
TABLET ORAL
COMMUNITY
Start: 2014-03-14

## 2019-04-16 RX ORDER — AMOXICILLIN 500 MG/1
TABLET, FILM COATED ORAL
COMMUNITY

## 2019-04-17 PROBLEM — M15.9 GENERALIZED OA: Status: RESOLVED | Noted: 2017-11-07 | Resolved: 2019-04-17

## 2019-04-18 ENCOUNTER — OFFICE VISIT (OUTPATIENT)
Dept: INTERNAL MEDICINE CLINIC | Facility: CLINIC | Age: 84
End: 2019-04-18
Payer: MEDICARE

## 2019-04-18 VITALS
RESPIRATION RATE: 18 BRPM | SYSTOLIC BLOOD PRESSURE: 138 MMHG | DIASTOLIC BLOOD PRESSURE: 70 MMHG | HEIGHT: 64.8 IN | TEMPERATURE: 98 F | BODY MASS INDEX: 28.72 KG/M2 | OXYGEN SATURATION: 95 % | WEIGHT: 172.38 LBS | HEART RATE: 62 BPM

## 2019-04-18 DIAGNOSIS — R73.01 IFG (IMPAIRED FASTING GLUCOSE): ICD-10-CM

## 2019-04-18 DIAGNOSIS — C85.90 NEUROPATHY ASSOCIATED WITH LYMPHOMA (HCC): ICD-10-CM

## 2019-04-18 DIAGNOSIS — G47.00 INSOMNIA, UNSPECIFIED TYPE: ICD-10-CM

## 2019-04-18 DIAGNOSIS — I10 ESSENTIAL HYPERTENSION: ICD-10-CM

## 2019-04-18 DIAGNOSIS — E89.0 HISTORY OF PARTIAL THYROIDECTOMY: ICD-10-CM

## 2019-04-18 DIAGNOSIS — Z00.00 ENCOUNTER FOR ANNUAL HEALTH EXAMINATION: ICD-10-CM

## 2019-04-18 DIAGNOSIS — G63 NEUROPATHY ASSOCIATED WITH LYMPHOMA (HCC): ICD-10-CM

## 2019-04-18 DIAGNOSIS — E53.8 B12 DEFICIENCY: Primary | ICD-10-CM

## 2019-04-18 PROCEDURE — 84443 ASSAY THYROID STIM HORMONE: CPT | Performed by: INTERNAL MEDICINE

## 2019-04-18 PROCEDURE — G0439 PPPS, SUBSEQ VISIT: HCPCS | Performed by: INTERNAL MEDICINE

## 2019-04-18 PROCEDURE — 84439 ASSAY OF FREE THYROXINE: CPT | Performed by: INTERNAL MEDICINE

## 2019-04-18 PROCEDURE — 36415 COLL VENOUS BLD VENIPUNCTURE: CPT | Performed by: INTERNAL MEDICINE

## 2019-04-18 PROCEDURE — 83036 HEMOGLOBIN GLYCOSYLATED A1C: CPT | Performed by: INTERNAL MEDICINE

## 2019-04-18 NOTE — PROGRESS NOTES
HPI:   Humberto Sifuentes is a 80year old female who presents for a Medicare Subsequent Annual Wellness visit (Pt already had Initial Annual Wellness).     No new C/O  Her last annual assessment has been over 1 year: Annual Physical due on 04/17/2019 partial thyroidectomy r lobe    Wt Readings from Last 3 Encounters:  04/18/19 : 172 lb 6.4 oz  04/01/19 : 174 lb 8 oz  03/19/19 : 172 lb 3.2 oz     Last Cholesterol Labs:   Lab Results   Component Value Date    CHOLEST 139 05/26/2015    HDL 66 05/26/2015 reports that she has never smoked. She has never used smokeless tobacco. She reports that she does not drink alcohol or use drugs.      REVIEW OF SYSTEMS:   GENERAL: feels well otherwise  SKIN:  skin lesions right scapula cyst  EYES: denies blurred vision o unlabored   Heart:  Regular rate and rhythm, S1 and S2 normal, no murmur, rub, or gallop   Abdomen:   Soft, non-tender, bowel sounds active all four quadrants,  no masses, no organomegaly   Pelvic: Deferred   Extremities: Extremities normal, atraumatic, no No  How does the patient maintain a good energy level?: Daily Walks; Appropriate Exercise  How would you describe your daily physical activity?: Light  How would you describe your current health state?: Fair  How do you maintain positive mental well-being?: Annually if high risk No results found for: CHLAMYDIA No flowsheet data found. Screening Mammogram      Mammogram Annually to 76, then as discussed There are no preventive care reminders to display for this patient.  Update [employer] if applicab AMB MEDICARE ANNUAL ASSESSMENT FEMALE [81350]

## 2019-04-18 NOTE — PATIENT INSTRUCTIONS
Luana Kraus's SCREENING SCHEDULE   Tests on this list are recommended by your physician but may not be covered, or covered at this frequency, by your insurer. Please check with your insurance carrier before scheduling to verify coverage.    PREVEN Limited to patients who meet one of the following criteria:   • Men who are 73-68 years old and have smoked more than 100 cigarettes in their lifetime   • Anyone with a family history    Colorectal Cancer Screening  Covered up to Age 76     Colonoscopy Scr care reminders to display for this patient.  Please get this Mammogram regularly   Immunizations      Influenza  Covered Annually Orders placed or performed in visit on 10/13/16   • FLU VACC [address] ANTIG   Orders placed or performed in visit on 10/20 website. http://[email or web address] [place]ph.state. [place]/public/books/advin.htm  A link to the [facility]. This site has a lot of good information including definitions of the different types of Advance Directives.  It also has the [facility] forms available

## 2019-04-20 DIAGNOSIS — C85.90 NEUROPATHY ASSOCIATED WITH LYMPHOMA (HCC): ICD-10-CM

## 2019-04-20 DIAGNOSIS — G63 NEUROPATHY ASSOCIATED WITH LYMPHOMA (HCC): ICD-10-CM

## 2019-04-20 RX ORDER — GABAPENTIN 600 MG/1
TABLET ORAL
Qty: 360 TABLET | Refills: 3 | Status: SHIPPED | OUTPATIENT
Start: 2019-04-20 | End: 2020-09-28

## 2019-07-22 ENCOUNTER — OFFICE VISIT (OUTPATIENT)
Dept: INTERNAL MEDICINE CLINIC | Facility: CLINIC | Age: 84
End: 2019-07-22
Payer: MEDICARE

## 2019-07-22 VITALS
RESPIRATION RATE: 16 BRPM | BODY MASS INDEX: 28 KG/M2 | WEIGHT: 169.81 LBS | SYSTOLIC BLOOD PRESSURE: 136 MMHG | HEART RATE: 68 BPM | DIASTOLIC BLOOD PRESSURE: 66 MMHG | OXYGEN SATURATION: 97 %

## 2019-07-22 DIAGNOSIS — E89.0 HISTORY OF PARTIAL THYROIDECTOMY: ICD-10-CM

## 2019-07-22 DIAGNOSIS — R73.01 IFG (IMPAIRED FASTING GLUCOSE): Primary | ICD-10-CM

## 2019-07-22 DIAGNOSIS — I10 ESSENTIAL HYPERTENSION: ICD-10-CM

## 2019-07-22 PROCEDURE — 99214 OFFICE O/P EST MOD 30 MIN: CPT | Performed by: INTERNAL MEDICINE

## 2019-07-22 PROCEDURE — 83036 HEMOGLOBIN GLYCOSYLATED A1C: CPT | Performed by: INTERNAL MEDICINE

## 2019-07-22 NOTE — PROGRESS NOTES
Is a 80year-old patient who has no specific complaints. Feeling better since her  is now at the [facility]. Able to sleep and well rested. Does visit him every day.   She denies any polyuria dips or aphasia no headaches dizziness shortness of breath

## 2019-07-23 NOTE — PROGRESS NOTES
Printed and placed in patients [facility].  V/O Dr Brenna Zavaleta

## 2019-07-27 ENCOUNTER — APPOINTMENT (OUTPATIENT)
Dept: GENERAL RADIOLOGY | Age: 84
End: 2019-07-27
Attending: FAMILY MEDICINE
Payer: MEDICARE

## 2019-07-27 ENCOUNTER — HOSPITAL ENCOUNTER (OUTPATIENT)
Age: 84
Discharge: HOME | End: 2019-07-27
Attending: FAMILY MEDICINE
Payer: MEDICARE

## 2019-07-27 VITALS
HEIGHT: 67 IN | DIASTOLIC BLOOD PRESSURE: 73 MMHG | OXYGEN SATURATION: 95 % | BODY MASS INDEX: 25.9 KG/M2 | RESPIRATION RATE: 16 BRPM | SYSTOLIC BLOOD PRESSURE: 140 MMHG | TEMPERATURE: 99 F | HEART RATE: 61 BPM | WEIGHT: 165 LBS

## 2019-07-27 DIAGNOSIS — S52.601A CLOSED FRACTURE OF DISTAL END OF RIGHT ULNA, UNSPECIFIED FRACTURE MORPHOLOGY, INITIAL ENCOUNTER: ICD-10-CM

## 2019-07-27 DIAGNOSIS — S52.531A CLOSED COLLES' FRACTURE OF RIGHT RADIUS, INITIAL ENCOUNTER: Primary | ICD-10-CM

## 2019-07-27 PROCEDURE — 29125 APPL SHORT ARM SPLINT STATIC: CPT

## 2019-07-27 PROCEDURE — 99204 OFFICE O/P NEW MOD 45 MIN: CPT

## 2019-07-27 PROCEDURE — 99215 OFFICE O/P EST HI 40 MIN: CPT

## 2019-07-27 PROCEDURE — 73090 X-RAY EXAM OF FOREARM: CPT | Performed by: FAMILY MEDICINE

## 2019-07-27 PROCEDURE — 73110 X-RAY EXAM OF WRIST: CPT | Performed by: FAMILY MEDICINE

## 2019-07-27 RX ORDER — IBUPROFEN 200 MG
400 TABLET ORAL ONCE
Status: COMPLETED | OUTPATIENT
Start: 2019-07-27 | End: 2019-07-27

## 2019-07-27 NOTE — ED INITIAL ASSESSMENT (HPI)
Pt presents today with family for c/o trip and fall earlier today. Pt denies hitting her head or any LOC. Pt has c/o right shoulder pain and right wrist pain.

## 2019-07-27 NOTE — ED PROVIDER NOTES
Patient Seen in: [facility]    History   Patient presents with:  Fall  Wrist Injury  Shoulder Pain    Stated Complaint: right arm injury from fall today    HPI    49-year-old female presents today for evaluation of her righ BMI 25.84 kg/m²         Physical Exam    Patient is alert oriented ×3 in no acute distress   conjunctiva clear no icterus, pupils equally react to light bilaterally extraocular movement intact. Head atraumatic normocephalic.   Neck supple full range of mot 7/27/2019  PROCEDURE:  XR WRIST COMPLETE (MIN 3 VIEWS), RIGHT (CPT=73110)  TECHNIQUE:  Three views were obtained. COMPARISON:  None.   INDICATIONS:  right arm injury from fall today  PATIENT STATED HISTORY: (As transcribed by Technologist)  Patient lost he

## 2019-07-29 ENCOUNTER — TELEPHONE (OUTPATIENT)
Dept: INTERNAL MEDICINE CLINIC | Facility: CLINIC | Age: 84
End: 2019-07-29

## 2019-07-29 DIAGNOSIS — S62.101B OPEN FRACTURE OF RIGHT WRIST, INITIAL ENCOUNTER: Primary | ICD-10-CM

## 2019-07-29 NOTE — TELEPHONE ENCOUNTER
Patient fell at [facility] chest on Saturday 7/27 and was seen at [facility] for a right radius fracture. She will be seeing Ortho on 7/31/19. Patient is requesting evaluation for home health since she is right hand dominant.

## 2019-07-31 ENCOUNTER — TELEPHONE (OUTPATIENT)
Dept: INTERNAL MEDICINE CLINIC | Facility: CLINIC | Age: 84
End: 2019-07-31

## 2019-07-31 DIAGNOSIS — R54 FRAIL ELDERLY: Primary | ICD-10-CM

## 2019-07-31 NOTE — TELEPHONE ENCOUNTER
Patient has started with health at home. She needs home health aid for shower because her right arm is in a cast from the elbow down and can not move it. She will need PT/OT. OT to start after her cast is off.  Needs PT to learn how to use her walker wi

## 2019-08-01 DIAGNOSIS — S62.101D OPEN FRACTURE OF RIGHT WRIST WITH ROUTINE HEALING, SUBSEQUENT ENCOUNTER: Primary | ICD-10-CM

## 2019-08-02 ENCOUNTER — TELEPHONE (OUTPATIENT)
Dept: INTERNAL MEDICINE CLINIC | Facility: CLINIC | Age: 84
End: 2019-08-02

## 2019-08-02 PROBLEM — S52.501A CLOSED FRACTURE OF DISTAL END OF RIGHT RADIUS, UNSPECIFIED FRACTURE MORPHOLOGY, INITIAL ENCOUNTER: Status: ACTIVE | Noted: 2019-08-02

## 2019-08-02 NOTE — TELEPHONE ENCOUNTER
Shirley Castillo (nurse) from [facility] states that she would like to know if a list of immunizations can be faxed to them. She states that Dr. Santosh Cantor signed the patient up for [facility] and knowing the immunization is a requirement.     Immunization flow sheet

## 2019-09-04 ENCOUNTER — TELEPHONE (OUTPATIENT)
Dept: CARDIOLOGY | Age: 84
End: 2019-09-04

## 2019-09-16 DIAGNOSIS — C85.90 NEUROPATHY ASSOCIATED WITH LYMPHOMA (HCC): ICD-10-CM

## 2019-09-16 DIAGNOSIS — G63 NEUROPATHY ASSOCIATED WITH LYMPHOMA (HCC): ICD-10-CM

## 2019-09-16 RX ORDER — CITALOPRAM 20 MG/1
20 TABLET ORAL DAILY
Qty: 30 TABLET | Refills: 5 | OUTPATIENT
Start: 2019-09-16

## 2019-09-16 RX ORDER — GABAPENTIN 600 MG/1
TABLET ORAL
Qty: 360 TABLET | Refills: 3 | OUTPATIENT
Start: 2019-09-16

## 2019-09-17 ENCOUNTER — OFFICE VISIT (OUTPATIENT)
Dept: CARDIOLOGY | Age: 84
End: 2019-09-17

## 2019-09-17 VITALS
DIASTOLIC BLOOD PRESSURE: 80 MMHG | HEART RATE: 56 BPM | SYSTOLIC BLOOD PRESSURE: 128 MMHG | WEIGHT: 169 LBS | BODY MASS INDEX: 25.03 KG/M2 | HEIGHT: 69 IN

## 2019-09-17 DIAGNOSIS — I47.19 ATRIAL TACHYCARDIA: Primary | ICD-10-CM

## 2019-09-17 PROCEDURE — 99215 OFFICE O/P EST HI 40 MIN: CPT | Performed by: INTERNAL MEDICINE

## 2019-09-17 RX ORDER — CITALOPRAM 20 MG/1
20 TABLET ORAL DAILY
Qty: 30 TABLET | Refills: 5 | OUTPATIENT
Start: 2019-09-17

## 2019-09-17 RX ORDER — OMEPRAZOLE 20 MG/1
20 CAPSULE, DELAYED RELEASE ORAL DAILY
COMMUNITY

## 2019-09-17 ASSESSMENT — ENCOUNTER SYMPTOMS
HEMOPTYSIS: 0
FEVER: 0
HEMATOCHEZIA: 0
BRUISES/BLEEDS EASILY: 0
CHILLS: 0
WEIGHT LOSS: 0
SUSPICIOUS LESIONS: 0
ALLERGIC/IMMUNOLOGIC COMMENTS: NO NEW FOOD ALLERGIES
COUGH: 0
WEIGHT GAIN: 0

## 2019-09-19 ENCOUNTER — OFFICE VISIT (OUTPATIENT)
Dept: INTERNAL MEDICINE CLINIC | Facility: CLINIC | Age: 84
End: 2019-09-19
Payer: MEDICARE

## 2019-09-19 VITALS
DIASTOLIC BLOOD PRESSURE: 72 MMHG | HEART RATE: 60 BPM | OXYGEN SATURATION: 96 % | SYSTOLIC BLOOD PRESSURE: 135 MMHG | RESPIRATION RATE: 18 BRPM | TEMPERATURE: 97 F | BODY MASS INDEX: 28 KG/M2 | WEIGHT: 168.81 LBS

## 2019-09-19 DIAGNOSIS — E53.8 B12 DEFICIENCY: ICD-10-CM

## 2019-09-19 DIAGNOSIS — I10 ESSENTIAL HYPERTENSION: ICD-10-CM

## 2019-09-19 DIAGNOSIS — R73.01 IFG (IMPAIRED FASTING GLUCOSE): Primary | ICD-10-CM

## 2019-09-19 DIAGNOSIS — M25.531 RIGHT WRIST PAIN: ICD-10-CM

## 2019-09-19 DIAGNOSIS — F32.A DEPRESSION, UNSPECIFIED DEPRESSION TYPE: ICD-10-CM

## 2019-09-19 PROCEDURE — 99214 OFFICE O/P EST MOD 30 MIN: CPT | Performed by: INTERNAL MEDICINE

## 2019-09-19 RX ORDER — CITALOPRAM 20 MG/1
20 TABLET ORAL DAILY
Qty: 90 TABLET | Refills: 3 | Status: SHIPPED | OUTPATIENT
Start: 2019-09-19 | End: 2021-05-10

## 2019-09-19 NOTE — PROGRESS NOTES
Problem #1 impaired fasting glucose. Patient denies any polyuria, polydipsia or polyphagia. No blood in stool. Needs to see her eye doctor. Important to see her eye doctor once a year.   Problem #2 hypertension she denies headaches dizziness chest pain

## 2019-09-20 PROCEDURE — 83036 HEMOGLOBIN GLYCOSYLATED A1C: CPT | Performed by: INTERNAL MEDICINE

## 2019-10-08 ENCOUNTER — IMMUNIZATION (OUTPATIENT)
Dept: INTERNAL MEDICINE CLINIC | Facility: CLINIC | Age: 84
End: 2019-10-08
Payer: MEDICARE

## 2019-10-08 DIAGNOSIS — Z23 NEED FOR VACCINATION: ICD-10-CM

## 2019-10-08 PROCEDURE — G0008 ADMIN INFLUENZA VIRUS VAC: HCPCS | Performed by: INTERNAL MEDICINE

## 2019-10-08 PROCEDURE — 90662 IIV NO PRSV INCREASED AG IM: CPT | Performed by: INTERNAL MEDICINE

## 2019-11-07 ENCOUNTER — TELEPHONE (OUTPATIENT)
Dept: INTERNAL MEDICINE CLINIC | Facility: CLINIC | Age: 84
End: 2019-11-07

## 2019-11-07 NOTE — TELEPHONE ENCOUNTER
Patient states that she received noticed that she is due for mammogram.  I told her generally her age we do not do mammogram and she agreed. We will discontinue the order.

## 2019-11-07 NOTE — TELEPHONE ENCOUNTER
I spoke with Sherryle Gosling. I advised her we generally do not recommend mammogram at age 80. No history of breast cancer. She states that she did get a message that she should get a mammogram I told her that if needed she agrees.

## 2019-11-27 ENCOUNTER — OFFICE VISIT (OUTPATIENT)
Dept: INTERNAL MEDICINE CLINIC | Facility: CLINIC | Age: 84
End: 2019-11-27
Payer: MEDICARE

## 2019-11-27 VITALS
OXYGEN SATURATION: 92 % | RESPIRATION RATE: 17 BRPM | TEMPERATURE: 98 F | DIASTOLIC BLOOD PRESSURE: 80 MMHG | HEART RATE: 59 BPM | SYSTOLIC BLOOD PRESSURE: 140 MMHG | WEIGHT: 168.31 LBS | BODY MASS INDEX: 28 KG/M2

## 2019-11-27 DIAGNOSIS — I10 ELEVATED BLOOD PRESSURE READING IN OFFICE WITH DIAGNOSIS OF HYPERTENSION: ICD-10-CM

## 2019-11-27 DIAGNOSIS — R07.9 CHEST PAIN, UNSPECIFIED TYPE: ICD-10-CM

## 2019-11-27 DIAGNOSIS — R30.0 DYSURIA: Primary | ICD-10-CM

## 2019-11-27 DIAGNOSIS — R94.31 ABNORMAL EKG: ICD-10-CM

## 2019-11-27 PROCEDURE — 87086 URINE CULTURE/COLONY COUNT: CPT | Performed by: INTERNAL MEDICINE

## 2019-11-27 PROCEDURE — 99214 OFFICE O/P EST MOD 30 MIN: CPT | Performed by: INTERNAL MEDICINE

## 2019-11-27 PROCEDURE — 81003 URINALYSIS AUTO W/O SCOPE: CPT | Performed by: INTERNAL MEDICINE

## 2019-11-27 PROCEDURE — 93000 ELECTROCARDIOGRAM COMPLETE: CPT | Performed by: INTERNAL MEDICINE

## 2019-11-27 RX ORDER — SULFAMETHOXAZOLE AND TRIMETHOPRIM 800; 160 MG/1; MG/1
1 TABLET ORAL 2 TIMES DAILY
Qty: 14 TABLET | Refills: 0 | Status: SHIPPED | OUTPATIENT
Start: 2019-11-27 | End: 2020-02-03 | Stop reason: ALTCHOICE

## 2019-11-27 NOTE — PROGRESS NOTES
Patient's initial complaint was dysuria. Urine dip did show the presence of white blood cells. We will send it out for culture and start her on Bactrim. She denies any flank pain no blood in the urine. No fever chills.   Patient then mentions that she h

## 2019-12-02 ENCOUNTER — TELEPHONE (OUTPATIENT)
Dept: INTERNAL MEDICINE CLINIC | Facility: CLINIC | Age: 84
End: 2019-12-02

## 2019-12-02 ENCOUNTER — OFFICE VISIT (OUTPATIENT)
Dept: INTERNAL MEDICINE CLINIC | Facility: CLINIC | Age: 84
End: 2019-12-02
Payer: MEDICARE

## 2019-12-02 VITALS
RESPIRATION RATE: 16 BRPM | OXYGEN SATURATION: 97 % | HEART RATE: 66 BPM | DIASTOLIC BLOOD PRESSURE: 70 MMHG | SYSTOLIC BLOOD PRESSURE: 135 MMHG | WEIGHT: 168.31 LBS | BODY MASS INDEX: 28 KG/M2
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
## 2019-12-02 DIAGNOSIS — G63 NEUROPATHY ASSOCIATED WITH LYMPHOMA (HCC): ICD-10-CM

## 2019-12-02 DIAGNOSIS — I10 ESSENTIAL HYPERTENSION: ICD-10-CM

## 2019-12-02 DIAGNOSIS — C85.90 NEUROPATHY ASSOCIATED WITH LYMPHOMA (HCC): ICD-10-CM

## 2019-12-02 PROCEDURE — 99214 OFFICE O/P EST MOD 30 MIN: CPT | Performed by: INTERNAL MEDICINE

## 2019-12-02 RX ORDER — BACLOFEN 20 MG/1
TABLET ORAL
Qty: 30 TABLET | Refills: 11 | Status: SHIPPED | OUTPATIENT
Start: 2019-12-02 | End: 2020-10-28

## 2019-12-02 NOTE — TELEPHONE ENCOUNTER
Received ML Incident Report that patient had fall in home / unsteady gait on 11/29/19. Patient states she lowered herself to the floor because her legs felt wobbly. No injury.       When I called patient she informed me that she felt like she was going to

## 2019-12-02 NOTE — PROGRESS NOTES
Patient had a mechanical fall this weekend. Did not lose consciousness not hit her head or sustain any injury. She does complain about neuropathy which she received a treatment for lymphoma. She is on maximal dose of gabapentin.   Difficulty falling asle

## 2019-12-04 ENCOUNTER — HOSPITAL ENCOUNTER (OUTPATIENT)
Dept: CV DIAGNOSTICS | Facility: HOSPITAL | Age: 84
Discharge: HOME | End: 2019-12-04
Attending: INTERNAL MEDICINE
Payer: MEDICARE

## 2019-12-04 DIAGNOSIS — R07.9 CHEST PAIN, UNSPECIFIED TYPE: ICD-10-CM

## 2019-12-04 DIAGNOSIS — R94.31 ABNORMAL EKG: ICD-10-CM

## 2019-12-04 PROCEDURE — 93017 CV STRESS TEST TRACING ONLY: CPT | Performed by: INTERNAL MEDICINE

## 2019-12-04 PROCEDURE — 78452 HT MUSCLE IMAGE SPECT MULT: CPT | Performed by: INTERNAL MEDICINE

## 2019-12-04 PROCEDURE — 93018 CV STRESS TEST I&R ONLY: CPT | Performed by: INTERNAL MEDICINE

## 2019-12-26 ENCOUNTER — OFFICE VISIT (OUTPATIENT)
Dept: INTERNAL MEDICINE CLINIC | Facility: CLINIC | Age: 84
End: 2019-12-26
Payer: MEDICARE

## 2019-12-26 VITALS
BODY MASS INDEX: 28 KG/M2 | WEIGHT: 169.88 LBS | DIASTOLIC BLOOD PRESSURE: 70 MMHG | SYSTOLIC BLOOD PRESSURE: 130 MMHG | HEART RATE: 65 BPM | OXYGEN SATURATION: 96 % | TEMPERATURE: 97 F | RESPIRATION RATE: 16 BRPM

## 2019-12-26 DIAGNOSIS — G47.00 INSOMNIA, UNSPECIFIED TYPE: ICD-10-CM

## 2019-12-26 DIAGNOSIS — G63 NEUROPATHY ASSOCIATED WITH LYMPHOMA (HCC): Primary | ICD-10-CM

## 2019-12-26 DIAGNOSIS — R73.01 IFG (IMPAIRED FASTING GLUCOSE): ICD-10-CM

## 2019-12-26 DIAGNOSIS — C85.90 NEUROPATHY ASSOCIATED WITH LYMPHOMA (HCC): Primary | ICD-10-CM

## 2019-12-26 DIAGNOSIS — I10 ESSENTIAL HYPERTENSION: ICD-10-CM

## 2019-12-26 PROBLEM — E11.42 DIABETIC POLYNEUROPATHY ASSOCIATED WITH TYPE 2 DIABETES MELLITUS (HCC): Status: ACTIVE | Noted: 2019-12-26

## 2019-12-26 PROCEDURE — 99214 OFFICE O/P EST MOD 30 MIN: CPT | Performed by: INTERNAL MEDICINE

## 2019-12-26 PROCEDURE — 83036 HEMOGLOBIN GLYCOSYLATED A1C: CPT | Performed by: INTERNAL MEDICINE

## 2019-12-26 NOTE — PROGRESS NOTES
Problem #1 neuropathy in her feet still present but better with gabapentin. Problem #2 hypertension she denies headaches dizziness chest pain or shortness of breath.   Problem 3 impaired fasting glucose she is up-to-date on eye exam.  Denies any polyuria d

## 2020-01-08 ENCOUNTER — NURSE ONLY (OUTPATIENT)
Dept: INTERNAL MEDICINE CLINIC | Facility: CLINIC | Age: 85
End: 2020-01-08
Payer: MEDICARE

## 2020-01-08 DIAGNOSIS — E53.8 B12 DEFICIENCY: ICD-10-CM

## 2020-01-08 LAB — VIT B12 SERPL-MCNC: 587 PG/ML (ref 193–986)

## 2020-01-08 PROCEDURE — 82607 VITAMIN B-12: CPT | Performed by: INTERNAL MEDICINE

## 2020-02-03 ENCOUNTER — OFFICE VISIT (OUTPATIENT)
Dept: INTERNAL MEDICINE CLINIC | Facility: CLINIC | Age: 85
End: 2020-02-03
Payer: MEDICARE

## 2020-02-03 VITALS
HEART RATE: 73 BPM | WEIGHT: 166 LBS | TEMPERATURE: 99 F | DIASTOLIC BLOOD PRESSURE: 80 MMHG | SYSTOLIC BLOOD PRESSURE: 135 MMHG | RESPIRATION RATE: 17 BRPM | BODY MASS INDEX: 28 KG/M2 | OXYGEN SATURATION: 96 %

## 2020-02-03 DIAGNOSIS — N30.00 ACUTE CYSTITIS WITHOUT HEMATURIA: ICD-10-CM

## 2020-02-03 DIAGNOSIS — R35.89 POLYURIA: Primary | ICD-10-CM

## 2020-02-03 DIAGNOSIS — R30.0 BURNING WITH URINATION: ICD-10-CM

## 2020-02-03 LAB
APPEARANCE: CLEAR
MULTISTIX LOT#: ABNORMAL NUMERIC
PH, URINE: 6 (ref 4.5–8)
SPECIFIC GRAVITY: 1.01 (ref 1–1.03)
URINE-COLOR: YELLOW
UROBILINOGEN,SEMI-QN: 1 MG/DL (ref 0–1.9)

## 2020-02-03 PROCEDURE — 99213 OFFICE O/P EST LOW 20 MIN: CPT | Performed by: INTERNAL MEDICINE

## 2020-02-03 PROCEDURE — 81003 URINALYSIS AUTO W/O SCOPE: CPT | Performed by: INTERNAL MEDICINE

## 2020-02-03 PROCEDURE — 87086 URINE CULTURE/COLONY COUNT: CPT | Performed by: INTERNAL MEDICINE

## 2020-02-03 RX ORDER — SULFAMETHOXAZOLE AND TRIMETHOPRIM 800; 160 MG/1; MG/1
1 TABLET ORAL 2 TIMES DAILY
Qty: 14 TABLET | Refills: 0 | Status: SHIPPED | OUTPATIENT
Start: 2020-02-03 | End: 2020-04-28 | Stop reason: ALTCHOICE

## 2020-02-03 NOTE — PROGRESS NOTES
Complains of burning on urination x3-day. There is a bad odor. No fever chills or night sweats no flank or suprapubic tenderness. Physical examination pleasant individual no acute distress. Normocephalic nonicteric no flank or suprapubic tenderness.

## 2020-02-05 ENCOUNTER — TELEPHONE (OUTPATIENT)
Dept: INTERNAL MEDICINE CLINIC | Facility: CLINIC | Age: 85
End: 2020-02-05

## 2020-02-05 NOTE — TELEPHONE ENCOUNTER
----- Message from Cally Dixon MD sent at 2/5/2020 10:05 AM CST -----  Please inform Woodrow Simmons that her urine was contaminated. Have her come down and give us a clean sample for culture.

## 2020-03-31 ENCOUNTER — TELEPHONE (OUTPATIENT)
Dept: INTERNAL MEDICINE CLINIC | Facility: CLINIC | Age: 85
End: 2020-03-31

## 2020-03-31 NOTE — TELEPHONE ENCOUNTER
Called pt because office received notice that she had a fall on 3/31/20 at 0950. Pt states \"it was a stupid fall, but I'm okay. \" Denies hitting her head, and states no pain, and that she fell on her buttock.  She will call office if she develops any pain

## 2020-04-28 ENCOUNTER — OFFICE VISIT (OUTPATIENT)
Dept: INTERNAL MEDICINE CLINIC | Facility: CLINIC | Age: 85
End: 2020-04-28
Payer: MEDICARE

## 2020-04-28 ENCOUNTER — TELEPHONE (OUTPATIENT)
Dept: INTERNAL MEDICINE CLINIC | Facility: CLINIC | Age: 85
End: 2020-04-28

## 2020-04-28 VITALS
OXYGEN SATURATION: 96 % | HEART RATE: 70 BPM | DIASTOLIC BLOOD PRESSURE: 80 MMHG | RESPIRATION RATE: 18 BRPM | SYSTOLIC BLOOD PRESSURE: 180 MMHG | WEIGHT: 170.63 LBS | BODY MASS INDEX: 29 KG/M2 | TEMPERATURE: 99 F

## 2020-04-28 DIAGNOSIS — I10 ELEVATED BLOOD PRESSURE READING IN OFFICE WITH DIAGNOSIS OF HYPERTENSION: ICD-10-CM

## 2020-04-28 DIAGNOSIS — R30.0 DYSURIA: Primary | ICD-10-CM

## 2020-04-28 DIAGNOSIS — R73.01 IFG (IMPAIRED FASTING GLUCOSE): ICD-10-CM

## 2020-04-28 PROCEDURE — 87086 URINE CULTURE/COLONY COUNT: CPT | Performed by: INTERNAL MEDICINE

## 2020-04-28 PROCEDURE — 99214 OFFICE O/P EST MOD 30 MIN: CPT | Performed by: INTERNAL MEDICINE

## 2020-04-28 PROCEDURE — 83036 HEMOGLOBIN GLYCOSYLATED A1C: CPT | Performed by: INTERNAL MEDICINE

## 2020-04-28 PROCEDURE — 81003 URINALYSIS AUTO W/O SCOPE: CPT | Performed by: INTERNAL MEDICINE

## 2020-04-28 NOTE — TELEPHONE ENCOUNTER
Returning pt's call. Patient states she's having, headaches, urine frequency, burning with urination x 5 days. Denies: nausea, vomiting, sore throat, diarrhea, fever.

## 2020-04-28 NOTE — PROGRESS NOTES
Problem 1 dysuria. Patient denies fever chills no flank pain. Problem 2 impaired fasting glucose patient denies polyuria dips or aphasia. Does not check home blood sugar. Is up-to-date eye exam.  Denies any numbness and tingling in arms and legs.   Prob

## 2020-05-12 ENCOUNTER — OFFICE VISIT (OUTPATIENT)
Dept: INTERNAL MEDICINE CLINIC | Facility: CLINIC | Age: 85
End: 2020-05-12
Payer: MEDICARE

## 2020-05-12 VITALS
TEMPERATURE: 99 F | WEIGHT: 171.13 LBS | RESPIRATION RATE: 16 BRPM | OXYGEN SATURATION: 94 % | HEART RATE: 68 BPM | BODY MASS INDEX: 29 KG/M2 | DIASTOLIC BLOOD PRESSURE: 60 MMHG | SYSTOLIC BLOOD PRESSURE: 124 MMHG

## 2020-05-12 DIAGNOSIS — I10 ELEVATED BLOOD PRESSURE READING IN OFFICE WITH DIAGNOSIS OF HYPERTENSION: Primary | ICD-10-CM

## 2020-05-12 PROCEDURE — 99213 OFFICE O/P EST LOW 20 MIN: CPT | Performed by: INTERNAL MEDICINE

## 2020-06-16 ENCOUNTER — OFFICE VISIT (OUTPATIENT)
Dept: INTERNAL MEDICINE CLINIC | Facility: CLINIC | Age: 85
End: 2020-06-16
Payer: MEDICARE

## 2020-06-16 VITALS
HEART RATE: 74 BPM | OXYGEN SATURATION: 96 % | BODY MASS INDEX: 28 KG/M2 | WEIGHT: 167.38 LBS | SYSTOLIC BLOOD PRESSURE: 150 MMHG | TEMPERATURE: 99 F | DIASTOLIC BLOOD PRESSURE: 80 MMHG | RESPIRATION RATE: 16 BRPM

## 2020-06-16 DIAGNOSIS — E89.0 HISTORY OF PARTIAL THYROIDECTOMY: ICD-10-CM

## 2020-06-16 DIAGNOSIS — I10 ELEVATED BLOOD PRESSURE READING IN OFFICE WITH DIAGNOSIS OF HYPERTENSION: Primary | ICD-10-CM

## 2020-06-16 DIAGNOSIS — R73.01 IFG (IMPAIRED FASTING GLUCOSE): ICD-10-CM

## 2020-06-16 PROCEDURE — 83036 HEMOGLOBIN GLYCOSYLATED A1C: CPT | Performed by: INTERNAL MEDICINE

## 2020-06-16 PROCEDURE — 80053 COMPREHEN METABOLIC PANEL: CPT | Performed by: INTERNAL MEDICINE

## 2020-06-16 PROCEDURE — 99214 OFFICE O/P EST MOD 30 MIN: CPT | Performed by: INTERNAL MEDICINE

## 2020-06-16 RX ORDER — LISINOPRIL 10 MG/1
10 TABLET ORAL DAILY
Qty: 90 TABLET | Refills: 3 | Status: SHIPPED | OUTPATIENT
Start: 2020-06-16 | End: 2021-06-11

## 2020-06-16 NOTE — PROGRESS NOTES
1.  Hypertension patient denies headaches dizziness chest pain or shortness of breath. Home readings were reviewed and most of them were high with systolics in the 826 range. Problem #2 impaired fasting glucose she denies any polyuria dips or aphasia.   U

## 2020-07-13 ENCOUNTER — TELEPHONE (OUTPATIENT)
Dept: INTERNAL MEDICINE CLINIC | Facility: CLINIC | Age: 85
End: 2020-07-13

## 2020-07-13 NOTE — TELEPHONE ENCOUNTER
Patient states that she was \"exposed\" to a friend a week ago that had a low exposure to a COVID Positive person. This friend is asymptomatic and is self quarantined per [employer].   Patient cannot recall if she was wearing a mask or not

## 2020-07-26 PROBLEM — E11.69 DM TYPE 2 WITH DIABETIC DYSLIPIDEMIA: Status: ACTIVE | Noted: 2020-07-26

## 2020-07-26 PROBLEM — S52.501A CLOSED FRACTURE OF DISTAL END OF RIGHT RADIUS, UNSPECIFIED FRACTURE MORPHOLOGY, INITIAL ENCOUNTER: Status: RESOLVED | Noted: 2019-08-02 | Resolved: 2020-07-26

## 2020-07-26 PROBLEM — E11.69 DM TYPE 2 WITH DIABETIC DYSLIPIDEMIA (HCC): Status: ACTIVE | Noted: 2020-07-26

## 2020-07-26 PROBLEM — E11.29 TYPE 2 DIABETES MELLITUS WITH KIDNEY COMPLICATION, WITHOUT LONG-TERM CURRENT USE OF INSULIN (HCC): Status: ACTIVE | Noted: 2020-07-26

## 2020-07-26 PROBLEM — E78.5 DM TYPE 2 WITH DIABETIC DYSLIPIDEMIA (HCC): Status: ACTIVE | Noted: 2020-07-26

## 2020-07-26 PROBLEM — E78.5 DM TYPE 2 WITH DIABETIC DYSLIPIDEMIA: Status: ACTIVE | Noted: 2020-07-26

## 2020-07-27 ENCOUNTER — OFFICE VISIT (OUTPATIENT)
Dept: INTERNAL MEDICINE CLINIC | Facility: CLINIC | Age: 85
End: 2020-07-27
Payer: MEDICARE

## 2020-07-27 VITALS
RESPIRATION RATE: 18 BRPM | WEIGHT: 169.88 LBS | HEART RATE: 60 BPM | SYSTOLIC BLOOD PRESSURE: 140 MMHG | TEMPERATURE: 97 F | OXYGEN SATURATION: 97 % | BODY MASS INDEX: 26.35 KG/M2 | DIASTOLIC BLOOD PRESSURE: 70 MMHG | HEIGHT: 67.18 IN

## 2020-07-27 DIAGNOSIS — Z00.00 ENCOUNTER FOR ANNUAL HEALTH EXAMINATION: ICD-10-CM

## 2020-07-27 DIAGNOSIS — E78.5 DM TYPE 2 WITH DIABETIC DYSLIPIDEMIA (HCC): ICD-10-CM

## 2020-07-27 DIAGNOSIS — G47.00 INSOMNIA, UNSPECIFIED TYPE: ICD-10-CM

## 2020-07-27 DIAGNOSIS — G63 NEUROPATHY ASSOCIATED WITH LYMPHOMA (HCC): ICD-10-CM

## 2020-07-27 DIAGNOSIS — I48.0 PAROXYSMAL ATRIAL FIBRILLATION (HCC): ICD-10-CM

## 2020-07-27 DIAGNOSIS — E53.8 B12 DEFICIENCY: ICD-10-CM

## 2020-07-27 DIAGNOSIS — E89.0 HISTORY OF PARTIAL THYROIDECTOMY: ICD-10-CM

## 2020-07-27 DIAGNOSIS — I10 ESSENTIAL HYPERTENSION: Primary | ICD-10-CM

## 2020-07-27 DIAGNOSIS — E11.69 DM TYPE 2 WITH DIABETIC DYSLIPIDEMIA (HCC): ICD-10-CM

## 2020-07-27 DIAGNOSIS — C85.90 NEUROPATHY ASSOCIATED WITH LYMPHOMA (HCC): ICD-10-CM

## 2020-07-27 DIAGNOSIS — M47.817 SPONDYLOSIS OF LUMBOSACRAL REGION WITHOUT MYELOPATHY OR RADICULOPATHY: ICD-10-CM

## 2020-07-27 DIAGNOSIS — E11.42 DIABETIC POLYNEUROPATHY ASSOCIATED WITH TYPE 2 DIABETES MELLITUS (HCC): ICD-10-CM

## 2020-07-27 PROCEDURE — 93000 ELECTROCARDIOGRAM COMPLETE: CPT | Performed by: INTERNAL MEDICINE

## 2020-07-27 PROCEDURE — G0009 ADMIN PNEUMOCOCCAL VACCINE: HCPCS | Performed by: INTERNAL MEDICINE

## 2020-07-27 PROCEDURE — 90670 PCV13 VACCINE IM: CPT | Performed by: INTERNAL MEDICINE

## 2020-07-27 PROCEDURE — G0439 PPPS, SUBSEQ VISIT: HCPCS | Performed by: INTERNAL MEDICINE

## 2020-07-27 NOTE — PROGRESS NOTES
HPI:   Jigar Roberto is a 80year old female who presents for a Medicare Subsequent Annual Wellness visit (Pt already had Initial Annual Wellness).     No new C/O        Her last annual assessment has been over 1 year: Annual Physical due on 04/18/202 Mackenzie Lopez MD as PCP - Rosa Duncan MD as Consulting Physician ([facility])  Emma Chávez MD as Consulting Physician ([facility])  Blaire Bucio MD as Consulting Physician (NEUROLOGY)    Patient Active Problem List:     Magdiel Prieto INFORMATION:   She  has a past medical history of Atypical atrial flutter ([facility] [address] (8/31/2015), Bilateral carotid artery disease ([facility] [address] (8/31/2015), Essential hypertension (8/31/2015), HLD (hyperlipidemia) (8/31/2015), and Osteoarthritis.     She  has a past kat eyes   Ears:  Normal TM's and external ear canals, both ears   Nose: Nares normal, septum midline,mucosa normal, no drainage or sinus tenderness   Throat: Lips, mucosa, and tongue normal; teeth and gums normal   Neck: Supple, symmetrical, trachea midline, visit:    Essential hypertension    DM type 2 with diabetic dyslipidemia ([address]    Diabetic polyneuropathy associated with type 2 diabetes mellitus ([address]    B12 deficiency    Insomnia, unspecified type    History of partial thyroidectomy r lobe    Spondylos SCHEDULE Internal Lab or Procedure External Lab or Procedure   Diabetes Screening      HbgA1C   Annually Lab Results   Component Value Date    A1C 6.0 (H) 06/16/2020    No flowsheet data found.     Fasting Blood Sugar (FSB)Annually Glucose (mg/dL)   Date Va (Prevnar)  Covered Once after 65 No vaccine history found Please get once after your 65th birthday    Pneumococcal 23 (Pneumovax)  Covered Once after 65 10/27/2000 Please get once after your 65th birthday    Hepatitis B for Moderate/High Risk No vaccine hi entered on: 3/25/2015   Last Dilated Eye Exam 3/19/2015     No flowsheet data found.             Template: TONE YOLANDA MEDICARE ANNUAL ASSESSMENT FEMALE [11427]

## 2020-07-27 NOTE — PATIENT INSTRUCTIONS
Luana Kraus's SCREENING SCHEDULE   Tests on this list are recommended by your physician but may not be covered, or covered at this frequency, by your insurer. Please check with your insurance carrier before scheduling to verify coverage.    PREVEN Limited to patients who meet one of the following criteria:   • Men who are 73-68 years old and have smoked more than 100 cigarettes in their lifetime   • Anyone with a family history    Colorectal Cancer Screening  Covered up to Age 76     Colonoscopy Scr care reminders to display for this patient.  Please get this Mammogram regularly   Immunizations      Influenza  Covered Annually Orders placed or performed in visit on 10/08/19   • FLU VACC HIGH DOSE PRSV FREE   Orders placed or performed in visit on 10/13 Directives    [email or web address] [email or web address]  An information packet, including necessary form from the [employer] website. http://[email or web address] [place].state. [place]/public/books/advin.htm  A link to the [place]

## 2020-08-18 RX ORDER — OMEPRAZOLE 20 MG/1
CAPSULE, DELAYED RELEASE ORAL
Qty: 90 CAPSULE | Refills: 3 | Status: SHIPPED | OUTPATIENT
Start: 2020-08-18 | End: 2021-05-10

## 2020-09-26 DIAGNOSIS — C85.90 NEUROPATHY ASSOCIATED WITH LYMPHOMA (HCC): ICD-10-CM

## 2020-09-26 DIAGNOSIS — G63 NEUROPATHY ASSOCIATED WITH LYMPHOMA (HCC): ICD-10-CM

## 2020-09-28 RX ORDER — GABAPENTIN 600 MG/1
TABLET ORAL
Qty: 360 TABLET | Refills: 3 | Status: SHIPPED | OUTPATIENT
Start: 2020-09-28

## 2020-10-01 ENCOUNTER — IMMUNIZATION (OUTPATIENT)
Dept: INTERNAL MEDICINE CLINIC | Facility: CLINIC | Age: 85
End: 2020-10-01
Payer: MEDICARE

## 2020-10-01 DIAGNOSIS — Z23 NEED FOR VACCINATION: ICD-10-CM

## 2020-10-01 PROCEDURE — G0008 ADMIN INFLUENZA VIRUS VAC: HCPCS | Performed by: INTERNAL MEDICINE

## 2020-10-01 PROCEDURE — 90662 IIV NO PRSV INCREASED AG IM: CPT | Performed by: INTERNAL MEDICINE

## 2020-10-28 ENCOUNTER — OFFICE VISIT (OUTPATIENT)
Dept: INTERNAL MEDICINE CLINIC | Facility: CLINIC | Age: 85
End: 2020-10-28
Payer: MEDICARE

## 2020-10-28 VITALS
SYSTOLIC BLOOD PRESSURE: 135 MMHG | TEMPERATURE: 98 F | OXYGEN SATURATION: 97 % | RESPIRATION RATE: 18 BRPM | WEIGHT: 165.5 LBS | BODY MASS INDEX: 26 KG/M2 | DIASTOLIC BLOOD PRESSURE: 70 MMHG | HEART RATE: 67 BPM

## 2020-10-28 DIAGNOSIS — E11.22 TYPE 2 DIABETES MELLITUS WITH STAGE 3B CHRONIC KIDNEY DISEASE, WITHOUT LONG-TERM CURRENT USE OF INSULIN (HCC): Primary | ICD-10-CM

## 2020-10-28 DIAGNOSIS — C85.90 NEUROPATHY ASSOCIATED WITH LYMPHOMA (HCC): ICD-10-CM

## 2020-10-28 DIAGNOSIS — N18.32 TYPE 2 DIABETES MELLITUS WITH STAGE 3B CHRONIC KIDNEY DISEASE, WITHOUT LONG-TERM CURRENT USE OF INSULIN (HCC): Primary | ICD-10-CM

## 2020-10-28 DIAGNOSIS — E11.42 DIABETIC POLYNEUROPATHY ASSOCIATED WITH TYPE 2 DIABETES MELLITUS (HCC): ICD-10-CM

## 2020-10-28 DIAGNOSIS — E53.8 B12 DEFICIENCY: ICD-10-CM

## 2020-10-28 DIAGNOSIS — G63 NEUROPATHY ASSOCIATED WITH LYMPHOMA (HCC): ICD-10-CM

## 2020-10-28 DIAGNOSIS — I10 ESSENTIAL HYPERTENSION: ICD-10-CM

## 2020-10-28 PROCEDURE — 99214 OFFICE O/P EST MOD 30 MIN: CPT | Performed by: INTERNAL MEDICINE

## 2020-10-28 NOTE — PROGRESS NOTES
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 Patient denies any polyuria dips or aphasia. Problem #2 neuropathy patient does have a history of neuropathy after being treated for lymphoma. She also has Beatties. Fair control with Neurontin.

## 2020-10-29 ENCOUNTER — NURSE ONLY (OUTPATIENT)
Dept: INTERNAL MEDICINE CLINIC | Facility: CLINIC | Age: 85
End: 2020-10-29
Payer: MEDICARE

## 2020-10-29 DIAGNOSIS — E11.22 TYPE 2 DIABETES MELLITUS WITH STAGE 3B CHRONIC KIDNEY DISEASE, WITHOUT LONG-TERM CURRENT USE OF INSULIN (HCC): ICD-10-CM

## 2020-10-29 DIAGNOSIS — N18.32 TYPE 2 DIABETES MELLITUS WITH STAGE 3B CHRONIC KIDNEY DISEASE, WITHOUT LONG-TERM CURRENT USE OF INSULIN (HCC): ICD-10-CM

## 2020-10-29 PROCEDURE — 82043 UR ALBUMIN QUANTITATIVE: CPT | Performed by: INTERNAL MEDICINE

## 2020-10-29 PROCEDURE — 82570 ASSAY OF URINE CREATININE: CPT | Performed by: INTERNAL MEDICINE

## 2020-10-30 ENCOUNTER — NURSE ONLY (OUTPATIENT)
Dept: INTERNAL MEDICINE CLINIC | Facility: CLINIC | Age: 85
End: 2020-10-30
Payer: MEDICARE

## 2020-10-30 DIAGNOSIS — E11.22 TYPE 2 DIABETES MELLITUS WITH STAGE 3B CHRONIC KIDNEY DISEASE, WITHOUT LONG-TERM CURRENT USE OF INSULIN (HCC): ICD-10-CM

## 2020-10-30 DIAGNOSIS — N18.32 TYPE 2 DIABETES MELLITUS WITH STAGE 3B CHRONIC KIDNEY DISEASE, WITHOUT LONG-TERM CURRENT USE OF INSULIN (HCC): ICD-10-CM

## 2020-10-30 PROCEDURE — 80061 LIPID PANEL: CPT | Performed by: INTERNAL MEDICINE

## 2020-10-30 PROCEDURE — 83036 HEMOGLOBIN GLYCOSYLATED A1C: CPT | Performed by: INTERNAL MEDICINE

## 2021-02-01 ENCOUNTER — OFFICE VISIT (OUTPATIENT)
Dept: INTERNAL MEDICINE CLINIC | Facility: CLINIC | Age: 86
End: 2021-02-01
Payer: MEDICARE

## 2021-02-01 VITALS
OXYGEN SATURATION: 97 % | WEIGHT: 164.88 LBS | TEMPERATURE: 99 F | DIASTOLIC BLOOD PRESSURE: 66 MMHG | RESPIRATION RATE: 18 BRPM | HEART RATE: 70 BPM | BODY MASS INDEX: 26 KG/M2 | SYSTOLIC BLOOD PRESSURE: 130 MMHG

## 2021-02-01 DIAGNOSIS — N18.32 TYPE 2 DIABETES MELLITUS WITH STAGE 3B CHRONIC KIDNEY DISEASE, WITHOUT LONG-TERM CURRENT USE OF INSULIN (HCC): Primary | ICD-10-CM

## 2021-02-01 DIAGNOSIS — E11.69 DM TYPE 2 WITH DIABETIC DYSLIPIDEMIA (HCC): ICD-10-CM

## 2021-02-01 DIAGNOSIS — I10 ESSENTIAL HYPERTENSION: ICD-10-CM

## 2021-02-01 DIAGNOSIS — E11.22 TYPE 2 DIABETES MELLITUS WITH STAGE 3B CHRONIC KIDNEY DISEASE, WITHOUT LONG-TERM CURRENT USE OF INSULIN (HCC): Primary | ICD-10-CM

## 2021-02-01 DIAGNOSIS — E78.5 DM TYPE 2 WITH DIABETIC DYSLIPIDEMIA (HCC): ICD-10-CM

## 2021-02-01 DIAGNOSIS — Z23 NEED FOR VACCINATION: ICD-10-CM

## 2021-02-01 DIAGNOSIS — G63 NEUROPATHY ASSOCIATED WITH LYMPHOMA (HCC): ICD-10-CM

## 2021-02-01 DIAGNOSIS — C85.90 NEUROPATHY ASSOCIATED WITH LYMPHOMA (HCC): ICD-10-CM

## 2021-02-01 PROCEDURE — 36415 COLL VENOUS BLD VENIPUNCTURE: CPT | Performed by: INTERNAL MEDICINE

## 2021-02-01 PROCEDURE — 99214 OFFICE O/P EST MOD 30 MIN: CPT | Performed by: INTERNAL MEDICINE

## 2021-02-01 PROCEDURE — 83036 HEMOGLOBIN GLYCOSYLATED A1C: CPT | Performed by: INTERNAL MEDICINE

## 2021-02-01 NOTE — PROGRESS NOTES
1.  Diabetes with chronic kidney disease problem 2 diabetes with dyslipidemia. Recent CMP and lipid panel reviewed. She denies any polyuria dips or aphasia. Does complain of some numbness and tingling in her legs. Does get some benefit from Neurontin.

## 2021-02-02 LAB
EST. AVERAGE GLUCOSE BLD GHB EST-MCNC: 111 MG/DL (ref 68–126)
HBA1C MFR BLD HPLC: 5.5 % (ref ?–5.7)

## 2021-05-10 ENCOUNTER — OFFICE VISIT (OUTPATIENT)
Dept: INTERNAL MEDICINE CLINIC | Facility: CLINIC | Age: 86
End: 2021-05-10
Payer: MEDICARE

## 2021-05-10 VITALS
WEIGHT: 159 LBS | DIASTOLIC BLOOD PRESSURE: 60 MMHG | OXYGEN SATURATION: 95 % | HEART RATE: 62 BPM | SYSTOLIC BLOOD PRESSURE: 118 MMHG | BODY MASS INDEX: 25 KG/M2 | RESPIRATION RATE: 18 BRPM

## 2021-05-10 DIAGNOSIS — I10 ESSENTIAL HYPERTENSION: ICD-10-CM

## 2021-05-10 DIAGNOSIS — R73.9 HYPERGLYCEMIA: Primary | ICD-10-CM

## 2021-05-10 PROCEDURE — 85025 COMPLETE CBC W/AUTO DIFF WBC: CPT | Performed by: INTERNAL MEDICINE

## 2021-05-10 PROCEDURE — 99214 OFFICE O/P EST MOD 30 MIN: CPT | Performed by: INTERNAL MEDICINE

## 2021-05-10 PROCEDURE — 80053 COMPREHEN METABOLIC PANEL: CPT | Performed by: INTERNAL MEDICINE

## 2021-05-10 PROCEDURE — 83036 HEMOGLOBIN GLYCOSYLATED A1C: CPT | Performed by: INTERNAL MEDICINE

## 2021-05-10 NOTE — PROGRESS NOTES
Is a very pleasant 80year-old patient. Problem #1 hypertension she denies headaches dizziness chest pain or shortness of breath. Appetite is good. Moves her bowels. No blood in the stool. No polyuria dysuria hematuria.   She does have a history of hyp

## 2021-07-29 ENCOUNTER — OFFICE VISIT (OUTPATIENT)
Dept: INTERNAL MEDICINE CLINIC | Facility: CLINIC | Age: 86
End: 2021-07-29
Payer: MEDICARE

## 2021-07-29 VITALS
OXYGEN SATURATION: 92 % | WEIGHT: 164 LBS | RESPIRATION RATE: 18 BRPM | HEART RATE: 72 BPM | HEIGHT: 64 IN | DIASTOLIC BLOOD PRESSURE: 65 MMHG | BODY MASS INDEX: 28 KG/M2 | SYSTOLIC BLOOD PRESSURE: 135 MMHG

## 2021-07-29 DIAGNOSIS — E78.5 DM TYPE 2 WITH DIABETIC DYSLIPIDEMIA (HCC): ICD-10-CM

## 2021-07-29 DIAGNOSIS — G47.00 INSOMNIA, UNSPECIFIED TYPE: ICD-10-CM

## 2021-07-29 DIAGNOSIS — E11.22 TYPE 2 DIABETES MELLITUS WITH STAGE 3B CHRONIC KIDNEY DISEASE, WITHOUT LONG-TERM CURRENT USE OF INSULIN (HCC): Primary | ICD-10-CM

## 2021-07-29 DIAGNOSIS — I10 ESSENTIAL HYPERTENSION: ICD-10-CM

## 2021-07-29 DIAGNOSIS — E11.42 DIABETIC POLYNEUROPATHY ASSOCIATED WITH TYPE 2 DIABETES MELLITUS (HCC): ICD-10-CM

## 2021-07-29 DIAGNOSIS — R26.89 BALANCE DISORDER: ICD-10-CM

## 2021-07-29 DIAGNOSIS — G63 NEUROPATHY ASSOCIATED WITH LYMPHOMA (HCC): ICD-10-CM

## 2021-07-29 DIAGNOSIS — E53.8 B12 DEFICIENCY: ICD-10-CM

## 2021-07-29 DIAGNOSIS — Z00.00 ENCOUNTER FOR ANNUAL HEALTH EXAMINATION: ICD-10-CM

## 2021-07-29 DIAGNOSIS — E89.0 HISTORY OF PARTIAL THYROIDECTOMY: ICD-10-CM

## 2021-07-29 DIAGNOSIS — I48.0 PAROXYSMAL ATRIAL FIBRILLATION (HCC): ICD-10-CM

## 2021-07-29 DIAGNOSIS — C85.90 NEUROPATHY ASSOCIATED WITH LYMPHOMA (HCC): ICD-10-CM

## 2021-07-29 DIAGNOSIS — N18.32 TYPE 2 DIABETES MELLITUS WITH STAGE 3B CHRONIC KIDNEY DISEASE, WITHOUT LONG-TERM CURRENT USE OF INSULIN (HCC): Primary | ICD-10-CM

## 2021-07-29 DIAGNOSIS — E11.69 DM TYPE 2 WITH DIABETIC DYSLIPIDEMIA (HCC): ICD-10-CM

## 2021-07-29 DIAGNOSIS — M47.817 SPONDYLOSIS OF LUMBOSACRAL REGION WITHOUT MYELOPATHY OR RADICULOPATHY: ICD-10-CM

## 2021-07-29 PROCEDURE — G0439 PPPS, SUBSEQ VISIT: HCPCS | Performed by: INTERNAL MEDICINE

## 2021-07-29 RX ORDER — LISINOPRIL 10 MG/1
10 TABLET ORAL DAILY
COMMUNITY
End: 2021-11-23

## 2021-07-29 RX ORDER — OMEPRAZOLE 20 MG/1
20 CAPSULE, DELAYED RELEASE ORAL DAILY
COMMUNITY
Start: 2021-06-14 | End: 2021-12-29

## 2021-07-29 RX ORDER — MULTIVIT-MIN/IRON/FOLIC ACID/K 18-600-40
CAPSULE ORAL
COMMUNITY

## 2021-07-29 NOTE — PATIENT INSTRUCTIONS
Luana Kraus's SCREENING SCHEDULE   Tests on this list are recommended by your physician but may not be covered, or covered at this frequency, by your insurer. Please check with your insurance carrier before scheduling to verify coverage.    PREV recommendations at this time   Pap and Pelvic    Pap   Covered every 2 years for women at normal risk;  Annually if at high risk -  No recommendations at this time    Chlamydia Annually if high risk -  No recommendations at this time   Screening Mammogram K 4.4 05/10/2021         Creatinine   Annually Lab Results   Component Value Date    CREATSERUM 1.11 (H) 05/10/2021         BUN Annually Lab Results   Component Value Date    BUN 33 (H) 05/10/2021       Drug Serum Conc Annually No results found for: DIG

## 2021-07-29 NOTE — PROGRESS NOTES
HPI:   Ramone Warren is a 80year old female who presents for a Medicare Subsequent Annual Wellness visit (Pt already had Initial Annual Wellness).     No new C/O  Her last annual assessment has been over 1 year: Annual Physical due on 07/27/2021 - General (Internal Medicine)  Niru Nazario MD as Consulting Physician (NEUROSURGERY)  Mohan Allen MD as Consulting Physician ([address] [place] [address])  Tamera Don MD as Consulting Physician (NEUROLOGY)    Patient Active Problem List:     Jazzy Wen carotid artery disease ([facility] [address] (2015), Essential hypertension (2015), HLD (hyperlipidemia) (2015), and Osteoarthritis. She  has a past surgical history that includes knee replacement surgery () and .     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, without long-term current use of insulin (HCC)    Spondylosis of lumbosacral region without myelopathy or radiculopathy    Neuropathy associated with lymphoma (HCC)    Insomnia, unspecified type    History of Diabetes Screening    Fasting Blood Sugar /  Glucose    One screening every 12 months if never tested or if previously tested but not diagnosed with pre-diabetes   One screening every 6 months if diagnosed with pre-diabetes Lab Results   Component Value and older    One baseline mammogram covered for patients aged 32-38 -    No recommendations at this time    Immunizations    Influenza Covered once per flu season  Please get every year 10/01/2020  No recommendations at this time    Pneumococcal Each vacci

## 2021-08-02 ENCOUNTER — NURSE ONLY (OUTPATIENT)
Dept: INTERNAL MEDICINE CLINIC | Facility: CLINIC | Age: 86
End: 2021-08-02
Payer: MEDICARE

## 2021-08-02 DIAGNOSIS — N18.32 TYPE 2 DIABETES MELLITUS WITH STAGE 3B CHRONIC KIDNEY DISEASE, WITHOUT LONG-TERM CURRENT USE OF INSULIN (HCC): ICD-10-CM

## 2021-08-02 DIAGNOSIS — E11.22 TYPE 2 DIABETES MELLITUS WITH STAGE 3B CHRONIC KIDNEY DISEASE, WITHOUT LONG-TERM CURRENT USE OF INSULIN (HCC): ICD-10-CM

## 2021-08-02 DIAGNOSIS — E11.69 DM TYPE 2 WITH DIABETIC DYSLIPIDEMIA (HCC): ICD-10-CM

## 2021-08-02 DIAGNOSIS — E78.5 DM TYPE 2 WITH DIABETIC DYSLIPIDEMIA (HCC): ICD-10-CM

## 2021-08-02 LAB
CHOLEST SMN-MCNC: 197 MG/DL (ref ?–200)
EST. AVERAGE GLUCOSE BLD GHB EST-MCNC: 128 MG/DL (ref 68–126)
HBA1C MFR BLD HPLC: 6.1 % (ref ?–5.7)
HDLC SERPL-MCNC: 69 MG/DL (ref 40–59)
LDLC SERPL CALC-MCNC: 115 MG/DL (ref ?–100)
NONHDLC SERPL-MCNC: 128 MG/DL (ref ?–130)
PATIENT FASTING Y/N/NP: YES
TRIGL SERPL-MCNC: 69 MG/DL (ref 30–149)
VLDLC SERPL CALC-MCNC: 12 MG/DL (ref 0–30)

## 2021-08-02 PROCEDURE — 80061 LIPID PANEL: CPT | Performed by: INTERNAL MEDICINE

## 2021-08-02 PROCEDURE — 83036 HEMOGLOBIN GLYCOSYLATED A1C: CPT | Performed by: INTERNAL MEDICINE

## 2021-08-03 RX ORDER — ATORVASTATIN CALCIUM 10 MG/1
10 TABLET, FILM COATED ORAL NIGHTLY
Qty: 30 TABLET | Refills: 5 | Status: SHIPPED | OUTPATIENT
Start: 2021-08-03

## 2021-08-31 ENCOUNTER — OFFICE VISIT (OUTPATIENT)
Dept: INTERNAL MEDICINE CLINIC | Facility: CLINIC | Age: 86
End: 2021-08-31
Payer: MEDICARE

## 2021-08-31 VITALS
SYSTOLIC BLOOD PRESSURE: 120 MMHG | OXYGEN SATURATION: 95 % | DIASTOLIC BLOOD PRESSURE: 60 MMHG | BODY MASS INDEX: 29 KG/M2 | RESPIRATION RATE: 19 BRPM | HEART RATE: 62 BPM | WEIGHT: 167 LBS

## 2021-08-31 DIAGNOSIS — I48.0 PAROXYSMAL ATRIAL FIBRILLATION (HCC): Primary | ICD-10-CM

## 2021-08-31 DIAGNOSIS — I10 ESSENTIAL HYPERTENSION: ICD-10-CM

## 2021-08-31 DIAGNOSIS — E11.69 DM TYPE 2 WITH DIABETIC DYSLIPIDEMIA (HCC): ICD-10-CM

## 2021-08-31 DIAGNOSIS — E78.5 DM TYPE 2 WITH DIABETIC DYSLIPIDEMIA (HCC): ICD-10-CM

## 2021-08-31 LAB
CREAT UR-SCNC: 33.6 MG/DL
MICROALBUMIN UR-MCNC: 0.59 MG/DL
MICROALBUMIN/CREAT 24H UR-RTO: 17.6 UG/MG (ref ?–30)

## 2021-08-31 PROCEDURE — 82043 UR ALBUMIN QUANTITATIVE: CPT | Performed by: INTERNAL MEDICINE

## 2021-08-31 PROCEDURE — 99214 OFFICE O/P EST MOD 30 MIN: CPT | Performed by: INTERNAL MEDICINE

## 2021-08-31 PROCEDURE — 82570 ASSAY OF URINE CREATININE: CPT | Performed by: INTERNAL MEDICINE

## 2021-08-31 RX ORDER — VIT A/VIT C/VIT E/ZINC/COPPER 2148-113
TABLET ORAL
COMMUNITY

## 2021-08-31 NOTE — PROGRESS NOTES
Problem 1 hypertension patient denies headaches dizziness chest pain shortness of breath. Problem #2 chronic A. fib clinically asymptomatic. No heart palpitations no neurological symptoms problem 3 diabetes mellitus.   Patient has been started on a statin

## 2021-10-04 ENCOUNTER — IMMUNIZATION (OUTPATIENT)
Dept: INTERNAL MEDICINE CLINIC | Facility: CLINIC | Age: 86
End: 2021-10-04
Payer: MEDICARE

## 2021-10-04 DIAGNOSIS — Z23 NEED FOR VACCINATION: Primary | ICD-10-CM

## 2021-10-04 PROCEDURE — 90662 IIV NO PRSV INCREASED AG IM: CPT | Performed by: INTERNAL MEDICINE

## 2021-10-04 PROCEDURE — G0008 ADMIN INFLUENZA VIRUS VAC: HCPCS | Performed by: INTERNAL MEDICINE

## 2021-11-03 ENCOUNTER — OFFICE VISIT (OUTPATIENT)
Dept: INTERNAL MEDICINE CLINIC | Facility: CLINIC | Age: 86
End: 2021-11-03
Payer: MEDICARE

## 2021-11-03 VITALS
OXYGEN SATURATION: 97 % | HEART RATE: 58 BPM | WEIGHT: 167 LBS | RESPIRATION RATE: 18 BRPM | DIASTOLIC BLOOD PRESSURE: 70 MMHG | SYSTOLIC BLOOD PRESSURE: 130 MMHG | BODY MASS INDEX: 29 KG/M2

## 2021-11-03 DIAGNOSIS — E78.5 DM TYPE 2 WITH DIABETIC DYSLIPIDEMIA (HCC): Primary | ICD-10-CM

## 2021-11-03 DIAGNOSIS — E11.69 DM TYPE 2 WITH DIABETIC DYSLIPIDEMIA (HCC): Primary | ICD-10-CM

## 2021-11-03 PROCEDURE — 99214 OFFICE O/P EST MOD 30 MIN: CPT | Performed by: INTERNAL MEDICINE

## 2021-11-03 NOTE — PROGRESS NOTES
Problem 1 diabetes mellitus with dyslipidemia. Patient was recently started on a statin. He has no side effects. Denies any polyuria dips or aphasia. Needs to see her eye doctor once a year.   Problem #2 diabetes mellitus with neuropathy unchanged probl

## 2021-11-09 ENCOUNTER — NURSE ONLY (OUTPATIENT)
Dept: INTERNAL MEDICINE CLINIC | Facility: CLINIC | Age: 86
End: 2021-11-09
Payer: MEDICARE

## 2021-11-09 DIAGNOSIS — E11.69 DM TYPE 2 WITH DIABETIC DYSLIPIDEMIA (HCC): ICD-10-CM

## 2021-11-09 DIAGNOSIS — E78.5 DM TYPE 2 WITH DIABETIC DYSLIPIDEMIA (HCC): ICD-10-CM

## 2021-11-09 PROCEDURE — 83036 HEMOGLOBIN GLYCOSYLATED A1C: CPT | Performed by: INTERNAL MEDICINE

## 2021-11-09 PROCEDURE — 80061 LIPID PANEL: CPT | Performed by: INTERNAL MEDICINE

## 2021-11-23 RX ORDER — LISINOPRIL 10 MG/1
TABLET ORAL
Qty: 90 TABLET | Refills: 1 | Status: SHIPPED | OUTPATIENT
Start: 2021-11-23

## 2021-12-29 RX ORDER — OMEPRAZOLE 20 MG/1
CAPSULE, DELAYED RELEASE ORAL
Qty: 90 CAPSULE | Refills: 0 | Status: SHIPPED | OUTPATIENT
Start: 2021-12-29

## 2022-02-03 ENCOUNTER — OFFICE VISIT (OUTPATIENT)
Dept: INTERNAL MEDICINE CLINIC | Facility: CLINIC | Age: 87
End: 2022-02-03
Payer: MEDICARE

## 2022-02-03 VITALS
RESPIRATION RATE: 16 BRPM | DIASTOLIC BLOOD PRESSURE: 60 MMHG | SYSTOLIC BLOOD PRESSURE: 130 MMHG | WEIGHT: 169 LBS | HEART RATE: 61 BPM | BODY MASS INDEX: 29 KG/M2 | OXYGEN SATURATION: 100 %

## 2022-02-03 DIAGNOSIS — E11.42 DIABETIC POLYNEUROPATHY ASSOCIATED WITH TYPE 2 DIABETES MELLITUS (HCC): ICD-10-CM

## 2022-02-03 DIAGNOSIS — N18.32 TYPE 2 DIABETES MELLITUS WITH STAGE 3B CHRONIC KIDNEY DISEASE, WITHOUT LONG-TERM CURRENT USE OF INSULIN (HCC): Primary | ICD-10-CM

## 2022-02-03 DIAGNOSIS — E53.8 B12 DEFICIENCY: ICD-10-CM

## 2022-02-03 DIAGNOSIS — E11.22 TYPE 2 DIABETES MELLITUS WITH STAGE 3B CHRONIC KIDNEY DISEASE, WITHOUT LONG-TERM CURRENT USE OF INSULIN (HCC): Primary | ICD-10-CM

## 2022-02-03 LAB
EST. AVERAGE GLUCOSE BLD GHB EST-MCNC: 131 MG/DL (ref 68–126)
HBA1C MFR BLD: 6.2 % (ref ?–5.7)

## 2022-02-03 PROCEDURE — 83036 HEMOGLOBIN GLYCOSYLATED A1C: CPT | Performed by: INTERNAL MEDICINE

## 2022-02-03 PROCEDURE — 93000 ELECTROCARDIOGRAM COMPLETE: CPT | Performed by: INTERNAL MEDICINE

## 2022-02-03 PROCEDURE — 99214 OFFICE O/P EST MOD 30 MIN: CPT | Performed by: INTERNAL MEDICINE

## 2022-02-03 NOTE — PROGRESS NOTES
Very pleasant 49-year-old female. History of diabetes with chronic kidney disease stage IIIb and with neuropathy. Recently was diagnosed with macular degeneration what type. Has a long history of osteoarthritis primary located in multiple organs. She had does have insomnia and hypertension. She denies headaches dizziness chest pain or shortness of breath. Patient still sews. Does not drive. Up-to-date on vaccines and boosted. Denies polyuria polydipsia or aphasia. Physical examination pleasant alert well orientated no acute distress normocephalic nonicteric. Carotids 1+ no bruits no thyromegaly or bruit. Lungs clear to auscultation. Cardiovascular system S1-S2 with ectopics. Abdomen soft nontender no organomegaly rebound or guarding. Bowel sounds active no bruits. Extremities no edema cyanosis or clubbing psych appropriate mood and affect. EKG sinus rhythm first-degree AV block with supraventricular premature complexes. Impression #1 diabetes mellitus check A1c #2 osteoarthritis stable #3 insomnia stable #4 hypertension well controlled #5 diabetes mellitus with dyslipidemia stable.     Plan check A1c return office 3 months

## 2022-02-08 RX ORDER — GABAPENTIN 600 MG/1
TABLET ORAL
Qty: 360 TABLET | Refills: 3 | Status: SHIPPED | OUTPATIENT
Start: 2022-02-08

## 2022-03-01 RX ORDER — ATORVASTATIN CALCIUM 10 MG/1
10 TABLET, FILM COATED ORAL NIGHTLY
Qty: 90 TABLET | Refills: 2 | Status: SHIPPED | OUTPATIENT
Start: 2022-03-01

## 2022-04-21 RX ORDER — OMEPRAZOLE 20 MG/1
CAPSULE, DELAYED RELEASE ORAL
Qty: 90 CAPSULE | Refills: 0 | Status: SHIPPED | OUTPATIENT
Start: 2022-04-21

## 2022-06-16 ENCOUNTER — TELEPHONE (OUTPATIENT)
Dept: INTERNAL MEDICINE CLINIC | Facility: CLINIC | Age: 87
End: 2022-06-16

## 2022-06-16 RX ORDER — LISINOPRIL 10 MG/1
TABLET ORAL
Qty: 90 TABLET | Refills: 1 | OUTPATIENT
Start: 2022-06-16

## 2022-06-20 ENCOUNTER — TELEPHONE (OUTPATIENT)
Dept: INTERNAL MEDICINE CLINIC | Facility: CLINIC | Age: 87
End: 2022-06-20

## 2022-06-20 RX ORDER — LISINOPRIL 10 MG/1
TABLET ORAL
Qty: 90 TABLET | Refills: 1 | OUTPATIENT
Start: 2022-06-20

## 2022-06-22 ENCOUNTER — OFFICE VISIT (OUTPATIENT)
Dept: INTERNAL MEDICINE CLINIC | Facility: CLINIC | Age: 87
End: 2022-06-22
Payer: MEDICARE

## 2022-06-22 VITALS
DIASTOLIC BLOOD PRESSURE: 70 MMHG | BODY MASS INDEX: 28.06 KG/M2 | SYSTOLIC BLOOD PRESSURE: 128 MMHG | TEMPERATURE: 97 F | OXYGEN SATURATION: 97 % | HEART RATE: 68 BPM | WEIGHT: 164.38 LBS | HEIGHT: 64.2 IN | RESPIRATION RATE: 18 BRPM

## 2022-06-22 DIAGNOSIS — E11.22 TYPE 2 DIABETES MELLITUS WITH STAGE 3B CHRONIC KIDNEY DISEASE, WITHOUT LONG-TERM CURRENT USE OF INSULIN (HCC): ICD-10-CM

## 2022-06-22 DIAGNOSIS — N18.32 TYPE 2 DIABETES MELLITUS WITH STAGE 3B CHRONIC KIDNEY DISEASE, WITHOUT LONG-TERM CURRENT USE OF INSULIN (HCC): ICD-10-CM

## 2022-06-22 DIAGNOSIS — E11.42 DIABETIC POLYNEUROPATHY ASSOCIATED WITH TYPE 2 DIABETES MELLITUS (HCC): ICD-10-CM

## 2022-06-22 DIAGNOSIS — I10 ESSENTIAL HYPERTENSION: ICD-10-CM

## 2022-06-22 DIAGNOSIS — E78.5 DM TYPE 2 WITH DIABETIC DYSLIPIDEMIA (HCC): Primary | ICD-10-CM

## 2022-06-22 DIAGNOSIS — E11.69 DM TYPE 2 WITH DIABETIC DYSLIPIDEMIA (HCC): Primary | ICD-10-CM

## 2022-06-22 PROCEDURE — 99214 OFFICE O/P EST MOD 30 MIN: CPT | Performed by: INTERNAL MEDICINE

## 2022-06-22 RX ORDER — LISINOPRIL 10 MG/1
10 TABLET ORAL DAILY
Qty: 90 TABLET | Refills: 3 | Status: SHIPPED | OUTPATIENT
Start: 2022-06-22

## 2022-06-22 NOTE — PROGRESS NOTES
This very pleasant 80year-old patient. Needs refill on her lisinopril. She denies any headaches dizziness chest pain or shortness of breath. Problem #2 diabetes mellitus with dyslipidemia chronic kidney disease stage IIIb and neuropathy. She denies any polyuria dips or aphasia up-to-date on eye exam.  She does have numbness and tingling in her lower extremities    Physical examination very pleasant individual no acute distress normocephalic nonicteric pupils are equal round reactive to light carotids 1+ no bruits no thyromegaly or bruit. No cervical lymphadenopathy. Lungs clear to auscultation. Cardiovascular system regular rate and rhythm extremities no edema cyanosis or clubbing. Impression #1 diabetes mellitus with dyslipidemia check A1c and lipid panel problem 2 diabetes mellitus with chronic kidney disease 3B check CMP problem 3 hypertension well controlled check CMP CBC refill lisinopril problem 4 diabetes mellitus with neuropathy clinically stable.

## 2022-06-30 ENCOUNTER — NURSE ONLY (OUTPATIENT)
Dept: INTERNAL MEDICINE CLINIC | Facility: CLINIC | Age: 87
End: 2022-06-30
Payer: MEDICARE

## 2022-06-30 DIAGNOSIS — I10 ESSENTIAL HYPERTENSION: ICD-10-CM

## 2022-06-30 DIAGNOSIS — E78.5 DM TYPE 2 WITH DIABETIC DYSLIPIDEMIA (HCC): ICD-10-CM

## 2022-06-30 DIAGNOSIS — E11.69 DM TYPE 2 WITH DIABETIC DYSLIPIDEMIA (HCC): ICD-10-CM

## 2022-06-30 LAB
ALBUMIN SERPL-MCNC: 4 G/DL (ref 3.4–5)
ALBUMIN/GLOB SERPL: 1.3 {RATIO} (ref 1–2)
ALP LIVER SERPL-CCNC: 74 U/L
ALT SERPL-CCNC: 17 U/L
ANION GAP SERPL CALC-SCNC: 6 MMOL/L (ref 0–18)
AST SERPL-CCNC: 20 U/L (ref 15–37)
BASOPHILS # BLD AUTO: 0.02 X10(3) UL (ref 0–0.2)
BASOPHILS NFR BLD AUTO: 0.3 %
BILIRUB SERPL-MCNC: 0.7 MG/DL (ref 0.1–2)
BUN BLD-MCNC: 21 MG/DL (ref 7–18)
CALCIUM BLD-MCNC: 9.2 MG/DL (ref 8.5–10.1)
CHLORIDE SERPL-SCNC: 112 MMOL/L (ref 98–112)
CHOLEST SERPL-MCNC: 150 MG/DL (ref ?–200)
CO2 SERPL-SCNC: 26 MMOL/L (ref 21–32)
CREAT BLD-MCNC: 1.14 MG/DL
EOSINOPHIL # BLD AUTO: 0.14 X10(3) UL (ref 0–0.7)
EOSINOPHIL NFR BLD AUTO: 2.1 %
ERYTHROCYTE [DISTWIDTH] IN BLOOD BY AUTOMATED COUNT: 13.1 %
EST. AVERAGE GLUCOSE BLD GHB EST-MCNC: 134 MG/DL (ref 68–126)
FASTING PATIENT LIPID ANSWER: YES
FASTING STATUS PATIENT QL REPORTED: YES
GLOBULIN PLAS-MCNC: 3.1 G/DL (ref 2.8–4.4)
GLUCOSE BLD-MCNC: 127 MG/DL (ref 70–99)
HBA1C MFR BLD: 6.3 % (ref ?–5.7)
HCT VFR BLD AUTO: 46.2 %
HDLC SERPL-MCNC: 77 MG/DL (ref 40–59)
HGB BLD-MCNC: 14.3 G/DL
IMM GRANULOCYTES # BLD AUTO: 0.02 X10(3) UL (ref 0–1)
IMM GRANULOCYTES NFR BLD: 0.3 %
LDLC SERPL CALC-MCNC: 62 MG/DL (ref ?–100)
LYMPHOCYTES # BLD AUTO: 2.55 X10(3) UL (ref 1–4)
LYMPHOCYTES NFR BLD AUTO: 38.6 %
MCH RBC QN AUTO: 29.9 PG (ref 26–34)
MCHC RBC AUTO-ENTMCNC: 31 G/DL (ref 31–37)
MCV RBC AUTO: 96.7 FL
MONOCYTES # BLD AUTO: 0.44 X10(3) UL (ref 0.1–1)
MONOCYTES NFR BLD AUTO: 6.7 %
NEUTROPHILS # BLD AUTO: 3.44 X10 (3) UL (ref 1.5–7.7)
NEUTROPHILS # BLD AUTO: 3.44 X10(3) UL (ref 1.5–7.7)
NEUTROPHILS NFR BLD AUTO: 52 %
NONHDLC SERPL-MCNC: 73 MG/DL (ref ?–130)
OSMOLALITY SERPL CALC.SUM OF ELEC: 303 MOSM/KG (ref 275–295)
PLATELET # BLD AUTO: 188 10(3)UL (ref 150–450)
POTASSIUM SERPL-SCNC: 5.1 MMOL/L (ref 3.5–5.1)
PROT SERPL-MCNC: 7.1 G/DL (ref 6.4–8.2)
RBC # BLD AUTO: 4.78 X10(6)UL
SODIUM SERPL-SCNC: 144 MMOL/L (ref 136–145)
TRIGL SERPL-MCNC: 54 MG/DL (ref 30–149)
VLDLC SERPL CALC-MCNC: 8 MG/DL (ref 0–30)
WBC # BLD AUTO: 6.6 X10(3) UL (ref 4–11)

## 2022-06-30 PROCEDURE — 85025 COMPLETE CBC W/AUTO DIFF WBC: CPT | Performed by: INTERNAL MEDICINE

## 2022-06-30 PROCEDURE — 80053 COMPREHEN METABOLIC PANEL: CPT | Performed by: INTERNAL MEDICINE

## 2022-06-30 PROCEDURE — 80061 LIPID PANEL: CPT | Performed by: INTERNAL MEDICINE

## 2022-06-30 PROCEDURE — 83036 HEMOGLOBIN GLYCOSYLATED A1C: CPT | Performed by: INTERNAL MEDICINE

## 2022-08-01 ENCOUNTER — TELEPHONE (OUTPATIENT)
Dept: INTERNAL MEDICINE CLINIC | Facility: CLINIC | Age: 87
End: 2022-08-01

## 2022-08-01 RX ORDER — NIRMATRELVIR AND RITONAVIR 150-100 MG
2 KIT ORAL 2 TIMES DAILY
Qty: 20 EACH | Refills: 0 | Status: SHIPPED | OUTPATIENT
Start: 2022-08-01

## 2022-08-01 RX ORDER — NIRMATRELVIR AND RITONAVIR 150-100 MG
2 KIT ORAL 2 TIMES DAILY
Qty: 20 EACH | Refills: 0 | Status: SHIPPED | OUTPATIENT
Start: 2022-08-01 | End: 2022-08-01

## 2022-08-01 RX ORDER — OMEPRAZOLE 20 MG/1
CAPSULE, DELAYED RELEASE ORAL
Qty: 90 CAPSULE | Refills: 3 | Status: SHIPPED | OUTPATIENT
Start: 2022-08-01

## 2022-08-01 NOTE — TELEPHONE ENCOUNTER
Please inform Dayami Figueredo was ordered.   She should isolate for 5 days the following 5 days she should be wearing N95 mask when exiting her apartment

## 2022-08-01 NOTE — TELEPHONE ENCOUNTER
[facility] does not carry the paxlovid medication. Can you please call it into [facility].  Pharmacy has been put in patents chart

## 2022-08-01 NOTE — TELEPHONE ENCOUNTER
Patient is C/O of congestion, cough, headache, and body aches. Denies fever. Patients symptoms started last Thursday 07/28/2022 but states that they have gotten better. She was taking a chloraseptic spray for her sore throat. She took an at home COVID test yesterday and it was positive. Ivonne Abbott She  has been notified and  Red dot has been placed on patients door.  She uses [facility]

## 2022-08-02 ENCOUNTER — TELEPHONE (OUTPATIENT)
Dept: INTERNAL MEDICINE CLINIC | Facility: CLINIC | Age: 87
End: 2022-08-02

## 2022-08-03 ENCOUNTER — TELEPHONE (OUTPATIENT)
Dept: INTERNAL MEDICINE CLINIC | Facility: CLINIC | Age: 87
End: 2022-08-03

## 2022-08-03 NOTE — TELEPHONE ENCOUNTER
Called patient to follow up on symptoms due to positive COVID test. Patient stated that she is still having fatigue and cough with congestion. No longer C/O sore throat and her headache and body aches are a little better. Patient started Paxlovid yesterday. Patient was advised to call the office or pull her emergency chord if symptoms get worse or if she experiences SOB or difficulty breathing. patient verbally understood. Will call patient tomorrow to follow up.

## 2022-08-03 NOTE — TELEPHONE ENCOUNTER
Patient states that she is not any better. Still has all same symptoms headache, body aches, cough, congestion, fatigue and sore throat. Started medication Paxlovid yesterday. Stated that she has had diarrhea today no nausea or vomiting. Patient was advised to pull her emergency chord if has SOB or difficulty breathing. Patient verbally understood. Will call tomorrow to follow up.

## 2022-08-04 ENCOUNTER — TELEPHONE (OUTPATIENT)
Dept: INTERNAL MEDICINE CLINIC | Facility: CLINIC | Age: 87
End: 2022-08-04

## 2022-08-04 NOTE — TELEPHONE ENCOUNTER
Patient states that she is still having cough with phlegm, congestion and body aches. Denies sore throat, SOB, diarrhea, nausea or vomiting. Will call on Monday to follow up.

## 2022-08-31 ENCOUNTER — OFFICE VISIT (OUTPATIENT)
Dept: INTERNAL MEDICINE CLINIC | Facility: CLINIC | Age: 87
End: 2022-08-31
Payer: MEDICARE

## 2022-08-31 VITALS
OXYGEN SATURATION: 95 % | RESPIRATION RATE: 18 BRPM | HEIGHT: 64.02 IN | HEART RATE: 65 BPM | SYSTOLIC BLOOD PRESSURE: 122 MMHG | WEIGHT: 169.81 LBS | TEMPERATURE: 97 F | DIASTOLIC BLOOD PRESSURE: 50 MMHG | BODY MASS INDEX: 28.99 KG/M2

## 2022-08-31 DIAGNOSIS — Z00.00 ENCOUNTER FOR ANNUAL HEALTH EXAMINATION: ICD-10-CM

## 2022-08-31 DIAGNOSIS — E11.42 DIABETIC POLYNEUROPATHY ASSOCIATED WITH TYPE 2 DIABETES MELLITUS (HCC): ICD-10-CM

## 2022-08-31 DIAGNOSIS — N18.32 TYPE 2 DIABETES MELLITUS WITH STAGE 3B CHRONIC KIDNEY DISEASE, WITHOUT LONG-TERM CURRENT USE OF INSULIN (HCC): ICD-10-CM

## 2022-08-31 DIAGNOSIS — E89.0 HISTORY OF PARTIAL THYROIDECTOMY: ICD-10-CM

## 2022-08-31 DIAGNOSIS — C85.90 NEUROPATHY ASSOCIATED WITH LYMPHOMA (HCC): ICD-10-CM

## 2022-08-31 DIAGNOSIS — G63 NEUROPATHY ASSOCIATED WITH LYMPHOMA (HCC): ICD-10-CM

## 2022-08-31 DIAGNOSIS — E53.8 B12 DEFICIENCY: Primary | ICD-10-CM

## 2022-08-31 DIAGNOSIS — E11.22 TYPE 2 DIABETES MELLITUS WITH STAGE 3B CHRONIC KIDNEY DISEASE, WITHOUT LONG-TERM CURRENT USE OF INSULIN (HCC): ICD-10-CM

## 2022-08-31 DIAGNOSIS — F51.01 PRIMARY INSOMNIA: ICD-10-CM

## 2022-08-31 DIAGNOSIS — I10 ESSENTIAL HYPERTENSION: ICD-10-CM

## 2022-08-31 DIAGNOSIS — M47.817 SPONDYLOSIS OF LUMBOSACRAL REGION WITHOUT MYELOPATHY OR RADICULOPATHY: ICD-10-CM

## 2022-08-31 PROCEDURE — 1125F AMNT PAIN NOTED PAIN PRSNT: CPT | Performed by: INTERNAL MEDICINE

## 2022-08-31 PROCEDURE — G0439 PPPS, SUBSEQ VISIT: HCPCS | Performed by: INTERNAL MEDICINE

## 2022-08-31 RX ORDER — IBUPROFEN 200 MG
200 TABLET ORAL EVERY 6 HOURS PRN
COMMUNITY

## 2022-11-08 ENCOUNTER — IMMUNIZATION (OUTPATIENT)
Dept: INTERNAL MEDICINE CLINIC | Facility: CLINIC | Age: 87
End: 2022-11-08
Payer: MEDICARE

## 2022-11-08 DIAGNOSIS — Z23 NEED FOR VACCINATION: Primary | ICD-10-CM

## 2022-11-08 PROCEDURE — G0008 ADMIN INFLUENZA VIRUS VAC: HCPCS | Performed by: INTERNAL MEDICINE

## 2022-11-08 PROCEDURE — 90662 IIV NO PRSV INCREASED AG IM: CPT | Performed by: INTERNAL MEDICINE

## 2023-06-06 DIAGNOSIS — E53.8 B12 DEFICIENCY: ICD-10-CM

## 2023-06-06 DIAGNOSIS — I10 ESSENTIAL HYPERTENSION: ICD-10-CM

## 2023-06-06 DIAGNOSIS — E11.42 DIABETIC POLYNEUROPATHY ASSOCIATED WITH TYPE 2 DIABETES MELLITUS (HCC): ICD-10-CM

## 2023-06-06 DIAGNOSIS — E11.22 TYPE 2 DIABETES MELLITUS WITH STAGE 3B CHRONIC KIDNEY DISEASE, WITHOUT LONG-TERM CURRENT USE OF INSULIN (HCC): ICD-10-CM

## 2023-06-06 DIAGNOSIS — N18.32 TYPE 2 DIABETES MELLITUS WITH STAGE 3B CHRONIC KIDNEY DISEASE, WITHOUT LONG-TERM CURRENT USE OF INSULIN (HCC): ICD-10-CM

## 2023-06-06 DIAGNOSIS — E78.5 DM TYPE 2 WITH DIABETIC DYSLIPIDEMIA (HCC): Primary | ICD-10-CM

## 2023-06-06 DIAGNOSIS — E11.69 DM TYPE 2 WITH DIABETIC DYSLIPIDEMIA (HCC): Primary | ICD-10-CM

## 2023-06-08 ENCOUNTER — NURSE ONLY (OUTPATIENT)
Dept: INTERNAL MEDICINE CLINIC | Facility: CLINIC | Age: 88
End: 2023-06-08
Payer: MEDICARE

## 2023-06-08 DIAGNOSIS — E11.42 DIABETIC POLYNEUROPATHY ASSOCIATED WITH TYPE 2 DIABETES MELLITUS (HCC): ICD-10-CM

## 2023-06-08 DIAGNOSIS — E78.5 DM TYPE 2 WITH DIABETIC DYSLIPIDEMIA (HCC): ICD-10-CM

## 2023-06-08 DIAGNOSIS — E53.8 B12 DEFICIENCY: ICD-10-CM

## 2023-06-08 DIAGNOSIS — E11.69 DM TYPE 2 WITH DIABETIC DYSLIPIDEMIA (HCC): ICD-10-CM

## 2023-06-08 DIAGNOSIS — I10 ESSENTIAL HYPERTENSION: ICD-10-CM

## 2023-06-08 LAB
ALBUMIN SERPL-MCNC: 3.7 G/DL (ref 3.4–5)
ALBUMIN/GLOB SERPL: 1.1 {RATIO} (ref 1–2)
ALP LIVER SERPL-CCNC: 71 U/L
ALT SERPL-CCNC: 20 U/L
ANION GAP SERPL CALC-SCNC: 4 MMOL/L (ref 0–18)
AST SERPL-CCNC: 22 U/L (ref 15–37)
BASOPHILS # BLD AUTO: 0.02 X10(3) UL (ref 0–0.2)
BASOPHILS NFR BLD AUTO: 0.4 %
BILIRUB SERPL-MCNC: 0.7 MG/DL (ref 0.1–2)
BUN BLD-MCNC: 26 MG/DL (ref 7–18)
CALCIUM BLD-MCNC: 8.7 MG/DL (ref 8.5–10.1)
CHLORIDE SERPL-SCNC: 113 MMOL/L (ref 98–112)
CHOLEST SERPL-MCNC: 130 MG/DL (ref ?–200)
CO2 SERPL-SCNC: 25 MMOL/L (ref 21–32)
CREAT BLD-MCNC: 1.04 MG/DL
CREAT UR-SCNC: 74.2 MG/DL
EOSINOPHIL # BLD AUTO: 0.12 X10(3) UL (ref 0–0.7)
EOSINOPHIL NFR BLD AUTO: 2.2 %
ERYTHROCYTE [DISTWIDTH] IN BLOOD BY AUTOMATED COUNT: 13.3 %
EST. AVERAGE GLUCOSE BLD GHB EST-MCNC: 137 MG/DL (ref 68–126)
FASTING PATIENT LIPID ANSWER: YES
FASTING STATUS PATIENT QL REPORTED: YES
GFR SERPLBLD BASED ON 1.73 SQ M-ARVRAT: 49 ML/MIN/1.73M2 (ref 60–?)
GLOBULIN PLAS-MCNC: 3.3 G/DL (ref 2.8–4.4)
GLUCOSE BLD-MCNC: 126 MG/DL (ref 70–99)
HBA1C MFR BLD: 6.4 % (ref ?–5.7)
HCT VFR BLD AUTO: 44.3 %
HDLC SERPL-MCNC: 65 MG/DL (ref 40–59)
HGB BLD-MCNC: 14.1 G/DL
IMM GRANULOCYTES # BLD AUTO: 0.01 X10(3) UL (ref 0–1)
IMM GRANULOCYTES NFR BLD: 0.2 %
LDLC SERPL CALC-MCNC: 47 MG/DL (ref ?–100)
LYMPHOCYTES # BLD AUTO: 2.47 X10(3) UL (ref 1–4)
LYMPHOCYTES NFR BLD AUTO: 44.6 %
MCH RBC QN AUTO: 30 PG (ref 26–34)
MCHC RBC AUTO-ENTMCNC: 31.8 G/DL (ref 31–37)
MCV RBC AUTO: 94.3 FL
MICROALBUMIN UR-MCNC: 1.81 MG/DL
MICROALBUMIN/CREAT 24H UR-RTO: 24.4 UG/MG (ref ?–30)
MONOCYTES # BLD AUTO: 0.35 X10(3) UL (ref 0.1–1)
MONOCYTES NFR BLD AUTO: 6.3 %
NEUTROPHILS # BLD AUTO: 2.57 X10 (3) UL (ref 1.5–7.7)
NEUTROPHILS # BLD AUTO: 2.57 X10(3) UL (ref 1.5–7.7)
NEUTROPHILS NFR BLD AUTO: 46.3 %
NONHDLC SERPL-MCNC: 65 MG/DL (ref ?–130)
OSMOLALITY SERPL CALC.SUM OF ELEC: 300 MOSM/KG (ref 275–295)
PLATELET # BLD AUTO: 202 10(3)UL (ref 150–450)
POTASSIUM SERPL-SCNC: 4.7 MMOL/L (ref 3.5–5.1)
PROT SERPL-MCNC: 7 G/DL (ref 6.4–8.2)
RBC # BLD AUTO: 4.7 X10(6)UL
SODIUM SERPL-SCNC: 142 MMOL/L (ref 136–145)
TRIGL SERPL-MCNC: 95 MG/DL (ref 30–149)
VIT B12 SERPL-MCNC: 434 PG/ML (ref 193–986)
VLDLC SERPL CALC-MCNC: 13 MG/DL (ref 0–30)
WBC # BLD AUTO: 5.5 X10(3) UL (ref 4–11)

## 2023-06-08 PROCEDURE — 85025 COMPLETE CBC W/AUTO DIFF WBC: CPT | Performed by: INTERNAL MEDICINE

## 2023-06-08 PROCEDURE — 80053 COMPREHEN METABOLIC PANEL: CPT | Performed by: INTERNAL MEDICINE

## 2023-06-08 PROCEDURE — 80061 LIPID PANEL: CPT | Performed by: INTERNAL MEDICINE

## 2023-06-08 PROCEDURE — 82043 UR ALBUMIN QUANTITATIVE: CPT | Performed by: INTERNAL MEDICINE

## 2023-06-08 PROCEDURE — 82607 VITAMIN B-12: CPT | Performed by: INTERNAL MEDICINE

## 2023-06-08 PROCEDURE — 82570 ASSAY OF URINE CREATININE: CPT | Performed by: INTERNAL MEDICINE

## 2023-06-08 PROCEDURE — 83036 HEMOGLOBIN GLYCOSYLATED A1C: CPT | Performed by: INTERNAL MEDICINE

## 2023-06-13 ENCOUNTER — TELEPHONE (OUTPATIENT)
Dept: INTERNAL MEDICINE CLINIC | Facility: CLINIC | Age: 88
End: 2023-06-13

## 2023-06-13 NOTE — TELEPHONE ENCOUNTER
Pt came 6/14/23 for apptointment      Patience Vickicecille is calling to get her lab results from last week. Left message for Luana to call and schedule appointment in order to get refill.

## 2023-06-14 ENCOUNTER — OFFICE VISIT (OUTPATIENT)
Dept: INTERNAL MEDICINE CLINIC | Facility: CLINIC | Age: 88
End: 2023-06-14
Payer: MEDICARE

## 2023-06-14 VITALS
OXYGEN SATURATION: 96 % | DIASTOLIC BLOOD PRESSURE: 80 MMHG | SYSTOLIC BLOOD PRESSURE: 130 MMHG | TEMPERATURE: 98 F | BODY MASS INDEX: 28 KG/M2 | WEIGHT: 164.63 LBS | HEART RATE: 60 BPM | RESPIRATION RATE: 18 BRPM

## 2023-06-14 DIAGNOSIS — G63 NEUROPATHY ASSOCIATED WITH LYMPHOMA (HCC): ICD-10-CM

## 2023-06-14 DIAGNOSIS — E53.8 B12 DEFICIENCY: Primary | ICD-10-CM

## 2023-06-14 DIAGNOSIS — I10 ESSENTIAL HYPERTENSION: ICD-10-CM

## 2023-06-14 DIAGNOSIS — E11.42 DIABETIC POLYNEUROPATHY ASSOCIATED WITH TYPE 2 DIABETES MELLITUS (HCC): ICD-10-CM

## 2023-06-14 DIAGNOSIS — C85.90 NEUROPATHY ASSOCIATED WITH LYMPHOMA (HCC): ICD-10-CM

## 2023-06-14 DIAGNOSIS — I48.0 PAROXYSMAL ATRIAL FIBRILLATION (HCC): ICD-10-CM

## 2023-06-14 PROCEDURE — 99214 OFFICE O/P EST MOD 30 MIN: CPT | Performed by: INTERNAL MEDICINE

## 2023-06-14 NOTE — PROGRESS NOTES
Is a very pleasant cognitive competent 80year-old patient. Does have a history of B12 deficiency recent B12 level within normal range. She does have polyneuropathy which is due to previous lymphoma and because of diabetes. She is on gabapentin 600 mg 3 times daily with some improvement. She also has a history of chronic A-fib she is a fall risk not on any anticoagulants. She also has diabetes with chronic kidney disease 3B. Recent CMP CBC A1c were reviewed with patient. A1c was 6.4. Physical examination pleasant individual no acute distress. Normocephalic nonicteric. Carotids 1+ no bruits. No thyromegaly or bruit. Lungs clear to auscultation. Cardiovascular system S1-S2 regular. Extremities no edema cyanosis or clubbing. Impression #1 B12 deficiency corrected problem #2 neuropathy secondary to lymphoma stable problem #2 diabetes mellitus with polyneuropathy A1c 6.4 recheck 3 months problem #5 paroxysmal A-fib currently appears to be in sinus rhythm patient is a fall risk not on anticoagulant problem #6 A-fib chronic kidney disease 3B stable.

## 2023-06-19 RX ORDER — LISINOPRIL 10 MG/1
10 TABLET ORAL DAILY
Qty: 90 TABLET | Refills: 3 | Status: SHIPPED | OUTPATIENT
Start: 2023-06-19

## 2023-06-22 DIAGNOSIS — C85.90 NEUROPATHY ASSOCIATED WITH LYMPHOMA (HCC): ICD-10-CM

## 2023-06-22 DIAGNOSIS — G63 NEUROPATHY ASSOCIATED WITH LYMPHOMA (HCC): ICD-10-CM

## 2023-06-22 RX ORDER — GABAPENTIN 600 MG/1
TABLET ORAL
Qty: 360 TABLET | Refills: 3 | Status: SHIPPED | OUTPATIENT
Start: 2023-06-22

## 2023-07-06 RX ORDER — ATORVASTATIN CALCIUM 10 MG/1
10 TABLET, FILM COATED ORAL NIGHTLY
Qty: 90 TABLET | Refills: 3 | Status: SHIPPED | OUTPATIENT
Start: 2023-07-06

## 2023-07-31 RX ORDER — OMEPRAZOLE 20 MG/1
20 CAPSULE, DELAYED RELEASE ORAL DAILY
Qty: 90 CAPSULE | Refills: 0 | Status: SHIPPED | OUTPATIENT
Start: 2023-07-31

## 2023-11-13 ENCOUNTER — TELEPHONE (OUTPATIENT)
Dept: FAMILY MEDICINE CLINIC | Facility: CLINIC | Age: 88
End: 2023-11-13

## 2024-04-05 ENCOUNTER — HOSPITAL ENCOUNTER (INPATIENT)
Facility: HOSPITAL | Age: 89
LOS: 3 days | Discharge: NURSING, REHAB OR LONG-TERM CARE | End: 2024-04-09
Attending: EMERGENCY MEDICINE | Admitting: HOSPITALIST
Payer: MEDICARE

## 2024-04-05 ENCOUNTER — APPOINTMENT (OUTPATIENT)
Dept: CT IMAGING | Facility: HOSPITAL | Age: 89
End: 2024-04-05
Attending: EMERGENCY MEDICINE
Payer: MEDICARE

## 2024-04-05 ENCOUNTER — APPOINTMENT (OUTPATIENT)
Dept: GENERAL RADIOLOGY | Facility: HOSPITAL | Age: 89
End: 2024-04-05
Attending: EMERGENCY MEDICINE
Payer: MEDICARE

## 2024-04-05 DIAGNOSIS — S12.001A CLOSED NONDISPLACED FRACTURE OF FIRST CERVICAL VERTEBRA, UNSPECIFIED FRACTURE MORPHOLOGY, INITIAL ENCOUNTER (HCC): Primary | ICD-10-CM

## 2024-04-05 DIAGNOSIS — R29.6 FREQUENT FALLS: ICD-10-CM

## 2024-04-05 DIAGNOSIS — S22.42XA CLOSED FRACTURE OF MULTIPLE RIBS OF LEFT SIDE, INITIAL ENCOUNTER: ICD-10-CM

## 2024-04-05 DIAGNOSIS — S12.101A CLOSED NONDISPLACED FRACTURE OF SECOND CERVICAL VERTEBRA, UNSPECIFIED FRACTURE MORPHOLOGY, INITIAL ENCOUNTER (HCC): ICD-10-CM

## 2024-04-05 PROBLEM — S22.49XA CLOSED FRACTURE OF MULTIPLE RIBS: Status: ACTIVE | Noted: 2024-04-05

## 2024-04-05 LAB
ALBUMIN SERPL-MCNC: 3.9 G/DL (ref 3.4–5)
ALBUMIN/GLOB SERPL: 1.1 {RATIO} (ref 1–2)
ALP LIVER SERPL-CCNC: 81 U/L
ALT SERPL-CCNC: 20 U/L
ANION GAP SERPL CALC-SCNC: 4 MMOL/L (ref 0–18)
AST SERPL-CCNC: 18 U/L (ref 15–37)
BASOPHILS # BLD AUTO: 0.02 X10(3) UL (ref 0–0.2)
BASOPHILS NFR BLD AUTO: 0.2 %
BILIRUB SERPL-MCNC: 0.5 MG/DL (ref 0.1–2)
BUN BLD-MCNC: 31 MG/DL (ref 9–23)
CALCIUM BLD-MCNC: 9.2 MG/DL (ref 8.5–10.1)
CHLORIDE SERPL-SCNC: 111 MMOL/L (ref 98–112)
CO2 SERPL-SCNC: 26 MMOL/L (ref 21–32)
CREAT BLD-MCNC: 1.43 MG/DL
EGFRCR SERPLBLD CKD-EPI 2021: 33 ML/MIN/1.73M2 (ref 60–?)
EOSINOPHIL # BLD AUTO: 0.14 X10(3) UL (ref 0–0.7)
EOSINOPHIL NFR BLD AUTO: 1.6 %
ERYTHROCYTE [DISTWIDTH] IN BLOOD BY AUTOMATED COUNT: 13.1 %
FLUAV + FLUBV RNA SPEC NAA+PROBE: NEGATIVE
FLUAV + FLUBV RNA SPEC NAA+PROBE: NEGATIVE
GLOBULIN PLAS-MCNC: 3.4 G/DL (ref 2.8–4.4)
GLUCOSE BLD-MCNC: 134 MG/DL (ref 70–99)
HCT VFR BLD AUTO: 39.9 %
HGB BLD-MCNC: 12.5 G/DL
IMM GRANULOCYTES # BLD AUTO: 0.04 X10(3) UL (ref 0–1)
IMM GRANULOCYTES NFR BLD: 0.5 %
LYMPHOCYTES # BLD AUTO: 1.58 X10(3) UL (ref 1–4)
LYMPHOCYTES NFR BLD AUTO: 18.5 %
MCH RBC QN AUTO: 29.9 PG (ref 26–34)
MCHC RBC AUTO-ENTMCNC: 31.3 G/DL (ref 31–37)
MCV RBC AUTO: 95.5 FL
MONOCYTES # BLD AUTO: 0.54 X10(3) UL (ref 0.1–1)
MONOCYTES NFR BLD AUTO: 6.3 %
NEUTROPHILS # BLD AUTO: 6.21 X10 (3) UL (ref 1.5–7.7)
NEUTROPHILS # BLD AUTO: 6.21 X10(3) UL (ref 1.5–7.7)
NEUTROPHILS NFR BLD AUTO: 72.9 %
OSMOLALITY SERPL CALC.SUM OF ELEC: 301 MOSM/KG (ref 275–295)
PLATELET # BLD AUTO: 188 10(3)UL (ref 150–450)
POTASSIUM SERPL-SCNC: 4.9 MMOL/L (ref 3.5–5.1)
PROT SERPL-MCNC: 7.3 G/DL (ref 6.4–8.2)
RBC # BLD AUTO: 4.18 X10(6)UL
RSV RNA SPEC NAA+PROBE: NEGATIVE
SARS-COV-2 RNA RESP QL NAA+PROBE: NOT DETECTED
SODIUM SERPL-SCNC: 141 MMOL/L (ref 136–145)
TROPONIN I SERPL HS-MCNC: 12 NG/L
WBC # BLD AUTO: 8.5 X10(3) UL (ref 4–11)

## 2024-04-05 PROCEDURE — 70450 CT HEAD/BRAIN W/O DYE: CPT | Performed by: EMERGENCY MEDICINE

## 2024-04-05 PROCEDURE — 71101 X-RAY EXAM UNILAT RIBS/CHEST: CPT | Performed by: EMERGENCY MEDICINE

## 2024-04-05 PROCEDURE — 99223 1ST HOSP IP/OBS HIGH 75: CPT | Performed by: HOSPITALIST

## 2024-04-05 PROCEDURE — 72125 CT NECK SPINE W/O DYE: CPT | Performed by: EMERGENCY MEDICINE

## 2024-04-05 RX ORDER — ACETAMINOPHEN 500 MG
1000 TABLET ORAL ONCE
Status: COMPLETED | OUTPATIENT
Start: 2024-04-05 | End: 2024-04-05

## 2024-04-05 RX ORDER — LISINOPRIL 20 MG/1
20 TABLET ORAL DAILY
COMMUNITY
Start: 2024-01-11

## 2024-04-05 RX ORDER — DILTIAZEM HYDROCHLORIDE 120 MG/1
120 TABLET, FILM COATED ORAL DAILY
Status: ON HOLD | COMMUNITY
End: 2024-04-09 | Stop reason: CLARIF

## 2024-04-05 RX ORDER — AMLODIPINE BESYLATE 5 MG/1
5 TABLET ORAL EVERY MORNING
COMMUNITY
Start: 2024-02-17

## 2024-04-05 NOTE — ED PROVIDER NOTES
Patient Seen in: [facility] Emergency Department      History     Chief Complaint   Patient presents with    Fall     Patient lives at assisted living. Patient states she became dizzy and fell.     Dizziness     Stated Complaint:     Subjective:   HPI    This is a 89-year-old female past medical history atrial flutter, hypertension, hyperlipidemia, macular degeneration, osteoarthritis who is of left rib pain status post fall today at her living facility [facility]..  Patient states has been very weak and falls frequently.  This has been going on for over a year.  She usually never comes to the hospital.  She states she falls at least twice a month.  She most recently fell today at her living facility [facility].  She states he was standing at the dresser and then just lost her balance and fell.  She did hit her head.  No LOC.  She is not on anticoagulants.  She states she really complains of pain mainly in her left ribs where she struck the dresser on the way down.  She has an abrasion over her ribs.  She presents here via fire department for further evaluation.    Objective:   Past Medical History:   Diagnosis Date    Atypical atrial flutter (HCC) 2015    Bilateral carotid artery disease (HCC) 2015    Cancer (HCC)     Cataract     Essential hypertension 2015    HLD (hyperlipidemia) 2015    Macular degeneration     Osteoarthritis               Past Surgical History:   Procedure Laterality Date          x3    KNEE REPLACEMENT SURGERY  2008    left                Social History     Socioeconomic History    Marital status:    Tobacco Use    Smoking status: Never    Smokeless tobacco: Never   Vaping Use    Vaping Use: Never used   Substance and Sexual Activity    Alcohol use: No     Alcohol/week: 0.0 standard drinks of alcohol    Drug use: No   Other Topics Concern    Caffeine Concern Yes     Comment: 2 a day    Exercise Yes     Comment: PT               Review of  Systems    Positive for stated complaint:   Other systems are as noted in HPI.  Constitutional and vital signs reviewed.      All other systems reviewed and negative except as noted above.    Physical Exam     ED Triage Vitals   BP 04/05/24 1816 154/68   Pulse 04/05/24 1746 70   Resp 04/05/24 1746 22   Temp 04/05/24 1746 98.6 °F (37 °C)   Temp src 04/05/24 1746 Temporal   SpO2 04/05/24 1746 95 %   O2 Device 04/05/24 1746 None (Room air)       Current:/85   Pulse 76   Temp 98.6 °F (37 °C) (Temporal)   Resp 18   Ht 162.6 cm (5' 4\")   Wt 60 kg   SpO2 95%   BMI 22.71 kg/m²         Physical Exam    GENERAL: Awake, alert oriented x3, nontoxic appearing.   SKIN: Normal, warm, and dry.  HEENT: Atraumatic.  No hematomas or trauma noted.  Pupils equally round and reactive to light. Conjuctiva clear.  Oropharynx is clear and moist.  Right-sided paracervical spinal tenderness.  Lungs: Clear to auscultation bilaterally with no rales, no retractions, and no wheezing.  HEART:  Regular rate and rhythm. S1 and S2. No murmurs, no rubs or gallops.   Chest: Tender over the left lateral ribs.  There is an abrasion noted to her left lateral chest wall.  ABDOMEN: Soft, nontender and nondistended. Normoactive bowel sounds. No rebound. No guarding.   No tenderness along thoracic or lumbar spine.  EXTREMITIES: Warm with brisk capillary refill.   Neuro: Motor strength 5/5 in upper and lower extremities.  Sensation equal and intact bilaterally.    ED Course     Labs Reviewed   COMP METABOLIC PANEL (14) - Abnormal; Notable for the following components:       Result Value    Glucose 134 (*)     BUN 31 (*)     Creatinine 1.43 (*)     Calculated Osmolality 301 (*)     eGFR-Cr 33 (*)     All other components within normal limits   TROPONIN I HIGH SENSITIVITY - Normal   SARS-COV-2/FLU A AND B/RSV BY PCR (GENEXPERT) - Normal    Narrative:     This test is intended for the qualitative detection and differentiation of SARS-CoV-2, influenza  A, influenza B, and respiratory syncytial virus (RSV) viral RNA in nasopharyngeal or nares swabs from individuals suspected of respiratory viral infection consistent with COVID-19 by their healthcare provider. Signs and symptoms of respiratory viral infection due to SARS-CoV-2, influenza, and RSV can be similar.    Test performed using the Xpert Xpress SARS-CoV-2/FLU/RSV (real time RT-PCR)  assay on the GeneXpert instrument, [employer], [place] [address].   This test is being used under the Food and Drug Administration's Emergency Use Authorization.    The authorized Fact Sheet for Healthcare Providers for this assay is available upon request from the laboratory.   CBC WITH DIFFERENTIAL WITH PLATELET    Narrative:     The following orders were created for panel order CBC With Differential With Platelet.  Procedure                               Abnormality         Status                     ---------                               -----------         ------                     CBC W/ DIFFERENTIAL[[id number]]                              Final result                 Please view results for these tests on the individual orders.   URINALYSIS, ROUTINE   RAINBOW DRAW LAVENDER   RAINBOW DRAW LIGHT GREEN   RAINBOW DRAW GOLD   RAINBOW DRAW BLUE   CBC W/ DIFFERENTIAL     EKG    Rate, intervals and axes as noted on EKG Report.  Rate: 67  Rhythm: Sinus Rhythm  Reading: AV block.  Right bundle sunita block.                 CT SPINE CERVICAL (CPT=72125)    Result Date: 4/5/2024  PROCEDURE:  CT SPINE CERVICAL (CPT=72125)  COMPARISON:  None.  INDICATIONS:  fall pain  TECHNIQUE:  Noncontrast CT scanning of the cervical spine is performed from the skull base through C7.  Multiplanar reconstructions are generated.  Dose reduction techniques were used. Dose information is transmitted to the ACR (American College of Radiology) NRDR (National Radiology Data Registry) which includes the Dose Index Registry.  PATIENT STATED HISTORY: (As  transcribed by Technologist)  Dizziness, fell.    FINDINGS:  There is minimally displaced fracture of the odontoid.  There is also nondisplaced fracture of the posterior arch of C1.  The cervical vertebral alignment demonstrates no subluxation. Ligamentous injury cannot be excluded on CT scan. There are normal cervical basilar relationships. The odontoid process is intact. No destructive osseous lesion is identified. The pre vertebral and tracy vertebral soft tissues are normal.  Multilevel degenerative changes with uncovertebral and facet hypertrophy.  There is mild spinal canal stenosis C2-C3.  Mild spinal canal stenosis at C6.  Thyromegaly with large nodule measuring approximately 6.6 x 5.0 cm.             CONCLUSION:  1. Minimally displaced odontoid fracture. 2. Nondisplaced fracture of the posterior arch of C1.  3. Multilevel degenerative changes of the cervical spine with uncovertebral and facet hypertrophy. 4. Thyromegaly with a left left thyroid lobe nodule measuring approximately 6.6 x 5.0 cm.  Consider ultrasound to further evaluate 5. This critical value result was called to Dr. Cid on 4/5/2024 at 8:25 p.m..  Results read back was performed.      LOCATION:  [facility]   Dictated by (CST): Jaleesa Barrow MD on 4/05/2024 at 8:23 PM     Finalized by (CST): Jaleesa Barrow MD on 4/05/2024 at 8:26 PM       CT BRAIN OR HEAD (07519)    Result Date: 4/5/2024  PROCEDURE:  CT BRAIN OR HEAD (07746)  COMPARISON:  ROBYN , CT, CT SPINE CERVICAL (CPT=72125), 4/05/2024, 7:58 PM.  INDICATIONS:  fall, pain  TECHNIQUE:  Noncontrast CT scanning is performed through the brain. Dose reduction techniques were used. Dose information is transmitted to the ACR (American College of Radiology) NRDR (National Radiology Data Registry) which includes the Dose Index Registry.  PATIENT STATED HISTORY: (As transcribed by Technologist)  Dizziness, fell.    FINDINGS:  There is cerebral atrophy with symmetric prominence of the  ventricles. There are patchy areas of low attenuation in the periventricular and deep white matter which are nonspecific but most consistent with small vessel ischemic changes. There is no evidence of hemorrhage, mass, midline shift, or extra-axial fluid collection.  The visualized paranasal sinuses show no significant sinus disease.. No evidence of depressed skull fracture.  There is a nondisplaced fracture of the posterior arch of C1.            CONCLUSION: 1. No acute intracranial findings 2. Cerebral atrophy with chronic microvascular ischemic changes. 3. Nondisplaced fracture of the posterior arch of C1.     LOCATION:  [facility]   Dictated by (CST): Jaleesa Barrow MD on 4/05/2024 at 8:20 PM     Finalized by (CST): Jaleesa Barrow MD on 4/05/2024 at 8:23 PM       XR RIBS WITH CHEST (3 VIEWS), LEFT  (CPT=71101)    Result Date: 4/5/2024  PROCEDURE:  XR RIBS WITH CHEST (3 VIEWS), LEFT  (CPT=71101)  TECHNIQUE:  PA Chest and three views of the ribs were obtained  COMPARISON:  None.  INDICATIONS:  fALL EVAL RIB FRACTURES  PATIENT STATED HISTORY: (As transcribed by Technologist)  Fall with left rib pain.    FINDINGS:  Cardiac size is upper limits normal.  Mild interstitial opacities.  No pleural effusions. No pneumothorax.  There are mildly displaced lateral 7th through 9th rib fractures.  Degenerative changes of the thoracic and lumbar spine.            CONCLUSION:  1. Mildly displaced lateral 7th through 9th rib fractures. 2. Mild interstitial opacities may represent mild edema.    LOCATION:  [facility]     Dictated by (CST): Jaleesa Barrow MD on 4/05/2024 at 7:36 PM     Finalized by (CST): Jaleesa Barrow MD on 4/05/2024 at 7:37 PM              MDM        This is a 89-year-old female past medical history atrial flutter, hypertension, hyperlipidemia, macular degeneration, osteoarthritis who is of left rib pain status post fall today at her living facility [facility]  Differential includes acute intracranial  injury which is a life threat, fractures, strain,.    Saline lock was established.  Basic labs were obtained.  CBC: White blood cell count 8.5.  Hemoglobin 12.5.  Platelet 188.  CMP: BUN 31.  Current 1.43.  Glucose 134.  Bicarb 26.  Troponin 12.      Rapid flu/COVID/influenza negative    CT scan brain was obtained and showed no acute intracranial findings  CT scan cervical spine was obtained and showed a C1/2 fracture.  Minimally displaced odontoid fracture.  Nondisplaced fracture of the posterior arch of C1.    Aspen collar was placed      I independently viewed the chest x-ray which shows rib fractures on the left side 7 through 9.  I also reviewed reviewed and agree radiology interpretation as above.      She was given Tylenol for pain.    Contacted  from neurosurgery.  Agrees with Gilby collar.  States this is all medical management.  Does not require any operative management.  She will need physical therapy.      Patient will be admitted for further evaluation.  She is in agreement with plan.  Expresses understanding.  Case discussed with [place] hospitalist Dr. Holder who came to bedside to evaluate patient.        Disposition and Plan     Clinical Impression:  1. Closed nondisplaced fracture of first cervical vertebra, unspecified fracture morphology, initial encounter ([facility])    2. Closed nondisplaced fracture of second cervical vertebra, unspecified fracture morphology, initial encounter ([facility])    3. Closed fracture of multiple ribs of left side, initial encounter    4. Frequent falls         Disposition:  Admit  4/5/2024 10:05 pm    Follow-up:  No follow-up provider specified.        Medications Prescribed:  Current Discharge Medication List                            Hospital Problems       Present on Admission  Date Reviewed: 6/14/2023            ICD-10-CM Noted POA    * (Principal) Closed nondisplaced fracture of first cervical vertebra, unspecified fracture morphology, initial encounter ([facility])  S12.001A 4/5/2024 Unknown    Closed fracture of multiple ribs S22.49XA 4/5/2024 Unknown

## 2024-04-05 NOTE — ED INITIAL ASSESSMENT (HPI)
Patient from assisted living. Per patinet, she became dizzy and \"slid down one of the pieces of furniture\" from a standing position. Patient complains of slight dizziness. Patient complains of left rib/ pain. Patient states she has been getting dizzy \"a lot the last several weeks. Patient denies neck/back pain, LOC, or extremity injury.

## 2024-04-06 PROBLEM — S22.42XA CLOSED FRACTURE OF MULTIPLE RIBS OF LEFT SIDE, INITIAL ENCOUNTER: Status: ACTIVE | Noted: 2024-04-06

## 2024-04-06 PROBLEM — R29.6 FREQUENT FALLS: Status: ACTIVE | Noted: 2024-04-06

## 2024-04-06 PROBLEM — S12.101A CLOSED NONDISPLACED FRACTURE OF SECOND CERVICAL VERTEBRA, UNSPECIFIED FRACTURE MORPHOLOGY, INITIAL ENCOUNTER (HCC): Status: ACTIVE | Noted: 2024-04-06

## 2024-04-06 LAB
ANION GAP SERPL CALC-SCNC: 3 MMOL/L (ref 0–18)
ATRIAL RATE: 67 BPM
BUN BLD-MCNC: 25 MG/DL (ref 9–23)
CALCIUM BLD-MCNC: 8.7 MG/DL (ref 8.5–10.1)
CHLORIDE SERPL-SCNC: 113 MMOL/L (ref 98–112)
CO2 SERPL-SCNC: 26 MMOL/L (ref 21–32)
CREAT BLD-MCNC: 1.08 MG/DL
EGFRCR SERPLBLD CKD-EPI 2021: 46 ML/MIN/1.73M2 (ref 60–?)
ERYTHROCYTE [DISTWIDTH] IN BLOOD BY AUTOMATED COUNT: 13.1 %
GLUCOSE BLD-MCNC: 130 MG/DL (ref 70–99)
GLUCOSE BLD-MCNC: 135 MG/DL (ref 70–99)
GLUCOSE BLD-MCNC: 138 MG/DL (ref 70–99)
HCT VFR BLD AUTO: 38.3 %
HGB BLD-MCNC: 12.7 G/DL
MCH RBC QN AUTO: 30.5 PG (ref 26–34)
MCHC RBC AUTO-ENTMCNC: 33.2 G/DL (ref 31–37)
MCV RBC AUTO: 92.1 FL
OSMOLALITY SERPL CALC.SUM OF ELEC: 300 MOSM/KG (ref 275–295)
P AXIS: -17 DEGREES
P-R INTERVAL: 270 MS
PLATELET # BLD AUTO: 180 10(3)UL (ref 150–450)
POTASSIUM SERPL-SCNC: 4.2 MMOL/L (ref 3.5–5.1)
Q-T INTERVAL: 436 MS
QRS DURATION: 132 MS
QTC CALCULATION (BEZET): 460 MS
R AXIS: 132 DEGREES
RBC # BLD AUTO: 4.16 X10(6)UL
SODIUM SERPL-SCNC: 142 MMOL/L (ref 136–145)
T AXIS: 46 DEGREES
VENTRICULAR RATE: 67 BPM
WBC # BLD AUTO: 7 X10(3) UL (ref 4–11)

## 2024-04-06 PROCEDURE — 99223 1ST HOSP IP/OBS HIGH 75: CPT | Performed by: NEUROLOGICAL SURGERY

## 2024-04-06 PROCEDURE — 99232 SBSQ HOSP IP/OBS MODERATE 35: CPT | Performed by: STUDENT IN AN ORGANIZED HEALTH CARE EDUCATION/TRAINING PROGRAM

## 2024-04-06 RX ORDER — DULOXETIN HYDROCHLORIDE 20 MG/1
20 CAPSULE, DELAYED RELEASE ORAL DAILY
COMMUNITY

## 2024-04-06 RX ORDER — BISACODYL 10 MG
10 SUPPOSITORY, RECTAL RECTAL
Status: DISCONTINUED | OUTPATIENT
Start: 2024-04-06 | End: 2024-04-09

## 2024-04-06 RX ORDER — ACETAMINOPHEN 500 MG
500 TABLET ORAL EVERY 4 HOURS PRN
Status: DISCONTINUED | OUTPATIENT
Start: 2024-04-06 | End: 2024-04-09

## 2024-04-06 RX ORDER — ONDANSETRON 2 MG/ML
8 INJECTION INTRAMUSCULAR; INTRAVENOUS EVERY 6 HOURS PRN
Status: DISCONTINUED | OUTPATIENT
Start: 2024-04-06 | End: 2024-04-09

## 2024-04-06 RX ORDER — BENZONATATE 100 MG/1
200 CAPSULE ORAL 3 TIMES DAILY PRN
Status: DISCONTINUED | OUTPATIENT
Start: 2024-04-06 | End: 2024-04-09

## 2024-04-06 RX ORDER — SENNOSIDES 8.6 MG
17.2 TABLET ORAL NIGHTLY PRN
Status: DISCONTINUED | OUTPATIENT
Start: 2024-04-06 | End: 2024-04-09

## 2024-04-06 RX ORDER — PANTOPRAZOLE SODIUM 20 MG/1
20 TABLET, DELAYED RELEASE ORAL
Status: DISCONTINUED | OUTPATIENT
Start: 2024-04-06 | End: 2024-04-09

## 2024-04-06 RX ORDER — GABAPENTIN 600 MG/1
600 TABLET ORAL 2 TIMES DAILY
Status: DISCONTINUED | OUTPATIENT
Start: 2024-04-06 | End: 2024-04-09

## 2024-04-06 RX ORDER — DULOXETIN HYDROCHLORIDE 20 MG/1
20 CAPSULE, DELAYED RELEASE ORAL DAILY
Status: DISCONTINUED | OUTPATIENT
Start: 2024-04-06 | End: 2024-04-09

## 2024-04-06 RX ORDER — MELATONIN
3 NIGHTLY PRN
Status: DISCONTINUED | OUTPATIENT
Start: 2024-04-06 | End: 2024-04-09

## 2024-04-06 RX ORDER — LISINOPRIL 20 MG/1
20 TABLET ORAL DAILY
Status: DISCONTINUED | OUTPATIENT
Start: 2024-04-06 | End: 2024-04-09

## 2024-04-06 RX ORDER — HEPARIN SODIUM 5000 [USP'U]/ML
5000 INJECTION, SOLUTION INTRAVENOUS; SUBCUTANEOUS EVERY 12 HOURS SCHEDULED
Status: DISCONTINUED | OUTPATIENT
Start: 2024-04-06 | End: 2024-04-09

## 2024-04-06 RX ORDER — HALOPERIDOL 5 MG/ML
1 INJECTION INTRAMUSCULAR ONCE
Status: DISCONTINUED | OUTPATIENT
Start: 2024-04-06 | End: 2024-04-09

## 2024-04-06 RX ORDER — ECHINACEA PURPUREA EXTRACT 125 MG
1 TABLET ORAL
Status: DISCONTINUED | OUTPATIENT
Start: 2024-04-06 | End: 2024-04-09

## 2024-04-06 RX ORDER — ATORVASTATIN CALCIUM 10 MG/1
10 TABLET, FILM COATED ORAL NIGHTLY
Status: DISCONTINUED | OUTPATIENT
Start: 2024-04-06 | End: 2024-04-09

## 2024-04-06 RX ORDER — GABAPENTIN 600 MG/1
1200 TABLET ORAL EVERY EVENING
Status: DISCONTINUED | OUTPATIENT
Start: 2024-04-06 | End: 2024-04-09

## 2024-04-06 RX ORDER — POLYETHYLENE GLYCOL 3350 17 G/17G
17 POWDER, FOR SOLUTION ORAL DAILY PRN
Status: DISCONTINUED | OUTPATIENT
Start: 2024-04-06 | End: 2024-04-09

## 2024-04-06 NOTE — PLAN OF CARE
Patient A&Ox4. VSS on RA. . Tele-NSR. Patient denies pain at this time. Patient denies any N/T at this time. Aspen collar in place. Bed rest. Voiding freely into purewick. LBM: 4/4. Regular diet. Plan for neuro sx to see and PT/OT eval once cleared by neuro sx. Updated patient on plan of care, verbalizes understanding. Call light within reach. Safety precautions in place.

## 2024-04-06 NOTE — PLAN OF CARE
Patient alert and oriented x4. VSS on RA. Tele in NSR. Pain reports pain controlled at this time. Denies n/t. Aspen collar on and aligned at all times. SCDs in place, ankle pumps encouraged, IS encouraged. Abrasion to left lateral back. Tolerating diet, no c/o n/v. Voiding freely with purewick in place. Rolling side to side.     Plan: PT/OT eval

## 2024-04-06 NOTE — OCCUPATIONAL THERAPY NOTE
OCCUPATIONAL THERAPY EVALUATION - INPATIENT     Room Number: 385/385-A  Evaluation Date: 4/6/2024  Type of Evaluation: Initial  Presenting Problem: Falls; Minimally displaced odontoid fracture & Nondisplaced fracture of the posterior arch of C1; L rib fractures    Physician Order: IP Consult to Occupational Therapy  Reason for Therapy: ADL/IADL Dysfunction and Discharge Planning    OCCUPATIONAL THERAPY ASSESSMENT   Patient is currently functioning below baseline with toileting, bathing, lower body dressing, brace management, bed mobility, transfers, static standing balance, dynamic standing balance, and functional standing tolerance. Prior to admission, patient's baseline is MOD I with RW and mobility, Adls, and light IADLs.  Patient is requiring  MIN A for mobility/transfers and SBA to MOD A for ADLs  as a result of the following impairments: decreased functional strength, decreased functional reach, decreased endurance, pain, impaired standing balance, cognitive deficits (dec'd carryover, problem-solving), decreased insight to deficits, and decreased safety awareness. Occupational Therapy will continue to follow for duration of hospitalization.    Patient will benefit from continued skilled OT Services to promote return to prior level of function and safety with continuous assistance and gradual rehabilitative therapy       History Related to Current Admission: Patient is a 89 year old female admitted on 4/5/2024 from [facility] for multiple falls. Imaging revealed nondisplaced fracture of posterior arch of C1, which likely happened a few months ago per nsgy and she is to wear Aspen collar at all times for 3 months. Pt also has multiple left-sided rib fractures, likely from fall yesterday.     Imaging:  CT SPINE CERVICAL  4/5/2024  1. Minimally displaced odontoid fracture. 2. Nondisplaced fracture of the posterior arch of C1.  3. Multilevel degenerative changes of the cervical spine with uncovertebral and facet  hypertrophy. 4. Thyromegaly with a left left thyroid lobe nodule measuring approximately 6.6 x 5.0 cm.  Consider ultrasound to further evaluate      CT BRAIN OR HEAD 4/5/2024  1. No acute intracranial findings 2. Cerebral atrophy with chronic microvascular ischemic changes. 3. Nondisplaced fracture of the posterior arch of C1.        XR RIBS WITH CHEST, LEFT 4/5/2024  1. Mildly displaced lateral 7th through 9th rib fractures. 2. Mild interstitial opacities may represent mild edema.    Co-Morbidities : Aflutter, HL, macular degeneration, OA    WEIGHT BEARING RESTRICTION                   Recommendations for nursing staff:   Transfers: MIN A with RW  Toileting location: toilet - do NOT leave patient    EVALUATION SESSION:  Patient Start of Session: semi-supine   FUNCTIONAL TRANSFER ASSESSMENT  Sit to Stand: Edge of Bed  Edge of Bed: Contact Guard Assist  Toilet Transfer: Minimal Assist    BED MOBILITY  Rolling: Minimal Assist  Supine to Sit : Minimal Assist (Pt with poor understanding and carryover for log rollinh despite step-by-step instructions)  Scooting: SBA    BALANCE ASSESSMENT  Static Sitting: Stand-by Assist  Sitting Bilateral: Minimal Assist (Pt tending to lean backward)  Static Standing: Contact Guard Assist  Standing Unilateral: Minimal Assist    FUNCTIONAL ADL ASSESSMENT  Eating: Supervision  Grooming Seated: Stand-by Assist  LB Dressing Seated: Moderate Assist  LB Dressing Standing: Minimal Assist      ACTIVITY TOLERANCE: Pt on room air and denies SOB, dizziness or lightheadedness throughout session. No significant change in vitals noted.                          O2 SATURATIONS       COGNITION  Arousal/Alertness:  appropriate responses to stimuli  Orientation Level:  oriented x4  Following Commands:  follows one step commands without difficulty and follows multistep commands with increased time  Safety Judgement:  decreased awareness of need for safety  Awareness of Deficits:  decreased awareness of  deficits  Problem Solving:  assistance required to identify errors made, assistance required to generate solutions, and assistance required to implement solutions    Upper Extremity   ROM: within functional limits    Coordination  Gross motor: wfl  Fine motor: wfl - noted with some shaking during self-feeding but able to do so without assist   Sensation: Light touch:  intact    EDUCATION PROVIDED  Patient: Role of Occupational Therapy; Plan of Care; Adaptive Equipment Recommendations; Functional Transfer Techniques; DME Recommendations; Fall Prevention; Surgical Precautions; Posture/Positioning; Compensatory ADL Techniques; Proper Body Mechanics; Energy Conservation; Bracing Education Provided  Patient's Response to Education: Verbalized Understanding; Requires Further Education; Demonstrates Poor Carry Over to Information  Family/Caregiver: Role of Occupational Therapy; Plan of Care; Adaptive Equipment Recommendations; DME Recommendations; Functional Transfer Techniques; Fall Prevention; Posture/Positioning; Compensatory ADL Techniques; Proper Body Mechanics; Bracing Education Provided  Family/Caregiver's Response to Education: Verbalized Understanding    Equipment used: RW  Demonstrates functional use, Would benefit from additional trial      Therapist comments: Pt is pleasant and motivated to participate in therapy.  Supportive daughter present and included in education. Pt educated re: log rolling, spinal precautions and integration into Adls and mobility. Pt needs frequent reminders and has difficulty with carryover, I.e.e sequencing log rolling technique. Pt walks with very narrow ROSCOE, nearly scissoring her BLEs, does not appear to have insight into this. She is able to widen ROSCOE with cueing for a few steps, then returns to narrow ROSCOE. Initiated education re: collar management but will need follow up.     Patient End of Session: Up in chair;Needs met;Call light within reach;RN aware of session/findings;All  patient questions and concerns addressed;Alarm set;Family present    OCCUPATIONAL PROFILE    HOME SITUATION  Type of Home: Independent living facility ([facility] [place])  Home Layout: One level  Lives With: Staff 24 hours    Toilet and Equipment: Comfort height toilet;Grab bar  Shower/Tub and Equipment: Walk-in shower;Grab bar;Shower chair  Other Equipment:  (RW)    Occupation/Status: retired     Drives: No  Patient Regularly Uses: Glasses    Prior Level of Function: Pt lives alone at [facility] [place] and is typically MOD I with ADLs and mobility with RW. Pt makes her own simple breakfast and lunch in her apartment and walks to dining andrea for dinner. Pt has cleaning assist but does her own laundry. Pt and daughter endorse at least 3 falls in the last month.     SUBJECTIVE   \"I've always been so independent.\"    PAIN ASSESSMENT  Rating: Unable to rate  Location: neck, L rib area  Management Techniques: Activity promotion;Body mechanics;Breathing techniques    OBJECTIVE  Precautions: Spine;Cervical brace;Bed/chair alarm (Aspen Collar)  Fall Risk: High fall risk      ASSESSMENTS    AM-PAC ‘6-Clicks’ Inpatient Daily Activity Short Form  -   Putting on and taking off regular lower body clothing?: A Lot  -   Bathing (including washing, rinsing, drying)?: A Lot  -   Toileting, which includes using toilet, bedpan or urinal? : A Lot  -   Putting on and taking off regular upper body clothing?: A Little  -   Taking care of personal grooming such as brushing teeth?: None  -   Eating meals?: None    AM-PAC Score:  Score: 17  Approx Degree of Impairment: 50.11%  Standardized Score (AM-PAC Scale): 37.26    ADDITIONAL TESTS     NEUROLOGICAL FINDINGS      COGNITION ASSESSMENTS       PLAN  OT Treatment Plan: Balance activities;Energy conservation/work simplification techniques;ADL training;Functional transfer training;Endurance training;Patient/Family education;Patient/Family training;Equipment eval/education;Compensatory  technique education  Rehab Potential : Good  Frequency: 3-5x/week  Number of Visits to Meet Established Goals: 5    ADL Goals   Patient will perform grooming: with stand by assist and while standing at sink  Patient will perform upper body dressing:  with stand by assist  Patient will perform lower body dressing:  with stand by assist and with adaptive equipment PRN  Patient will perform toileting: with stand by assist    Functional Transfer Goals  Patient will transfer from supine to sit:  with stand by assist  Patient will transfer from sit to stand:  with stand by assist  Patient will transfer to toilet:  with supervision    Additional Goals  Pt will don/doff cervical collar with supervision  Pt will verbalize precautions and independently adhere to precautions during ADL tasks    Patient Evaluation Complexity Level:   Occupational Profile/Medical History LOW - Brief history including review of medical or therapy records    Specific performance deficits impacting engagement in ADL/IADL MODERATE  3 - 5 performance deficits   Client Assessment/Performance Deficits MODERATE - Comorbidities and min to mod modifications of tasks    Clinical Decision Making MODERATE - Analysis of occupational profile, detailed assessments, several treatment options    Overall Complexity LOW     OT Session Time: 24 minutes  Self-Care Home Management: 5 minutes  Therapeutic Activity: 10 minutes

## 2024-04-06 NOTE — PHYSICAL THERAPY NOTE
Physical Therapy     Orders received and chart review completed. Pt admitted from [facility] for frequent falls. Imaging as below, placed in cervical collar, on bed rest until seen by nsgy. Will await nsgy input and initiate therapy as appropriate.      CT cervical spine 4/5  1. Minimally displaced odontoid fracture.   2. Nondisplaced fracture of the posterior arch of C1.    3. Multilevel degenerative changes of the cervical spine with uncovertebral and facet hypertrophy.   4. Thyromegaly with a left left thyroid lobe nodule measuring approximately 6.6 x 5.0 cm.  Consider ultrasound to further evaluate      Xray Ribs w/ chest, left 4/5  1. Mildly displaced lateral 7th through 9th rib fractures.   2. Mild interstitial opacities may represent mild edema.

## 2024-04-06 NOTE — ED QUICK NOTES
Orders for admission, patient is aware of plan and ready to go upstairs. Any questions, please call ED RN Demetris Crowder at extension [phone].     Patient Covid vaccination status: Fully vaccinated     COVID Test Ordered in ED: SARS-CoV-2/Flu A and B/RSV by PCR (GeneXpert)    COVID Suspicion at Admission: N/A    Running Infusions:  None    Mental Status/LOC at time of transport: Alert    Other pertinent information:   CIWA score: N/A   NIH score:  N/A

## 2024-04-06 NOTE — H&P
[facility]     Luana Kraus Patient Status:  Emergency    1925 MRN [id number]   Location [facility] EMERGENCY DEPARTMENT Attending Radha Cid DO   Hosp Day # 0 PCP No primary care provider on file.     Chief Complaint: weakness    Subjective:    History of Present Illness:     Luana Kraus is a 89 year old female with weakness.     Has had worsening dizziness last few weeks with occasional falls.  Slid down piece of furniture today and hit left rib area but not head.  No LOC.  However, fell face first in February and has had neck pain since that time.   No fevers, n/v, diarrhea, cough.    History/Other:    Past Medical History:  Past Medical History:   Diagnosis Date    Atypical atrial flutter (HCC) 2015    Bilateral carotid artery disease (HCC) 2015    Cancer (HCC)     Cataract     Essential hypertension 2015    HLD (hyperlipidemia) 2015    Macular degeneration     Osteoarthritis      Past Surgical History:   Past Surgical History:   Procedure Laterality Date          x3    KNEE REPLACEMENT SURGERY      left      Family History:   Family History   Problem Relation Age of Onset    Arthritis Mother        hypertension Social History:    reports that she has never smoked. She has never used smokeless tobacco. She reports that she does not drink alcohol and does not use drugs.     Allergies:   Allergies   Allergen Reactions    Zolpidem RESTLESSNESS and JITTERY       Medications:    No current facility-administered medications on file prior to encounter.     Current Outpatient Medications on File Prior to Encounter   Medication Sig Dispense Refill    amLODIPine 5 MG Oral Tab Take 1 tablet (5 mg total) by mouth every morning.      lisinopril 20 MG Oral Tab Take 1 tablet (20 mg total) by mouth daily.      dilTIAZem HCl 120 MG Oral Tab Take 1 tablet (120 mg total) by mouth daily.      omeprazole 20 MG Oral Capsule Delayed  Release Take 1 capsule (20 mg total) by mouth daily. 90 capsule 0    atorvastatin 10 MG Oral Tab Take 1 tablet (10 mg total) by mouth nightly. 90 tablet 3    GABAPENTIN 600 MG Oral Tab TAKE ONE TABLET BY MOUTH IN MORNING AND NOON AND 2 TABLET IN THE EVENING 360 tablet 3    Acetaminophen (TYLENOL ARTHRITIS PAIN OR) Take 500 mg by mouth as needed (patient takes 1 tablet every 8 hours as needed for pain).      Multiple Vitamins-Minerals (PRESERVISION AREDS) Oral Tab Take by mouth.         Review of Systems:   A comprehensive 12 point review of systems was completed.    Pertinent positives and negatives noted in the HPI.    Objective:   Physical Exam:    BP (!) 165/85   Pulse 80   Temp 98.6 °F (37 °C) (Temporal)   Resp 18   Ht 5' 4\" (1.626 m)   Wt 132 lb 4.4 oz (60 kg)   SpO2 96%   BMI 22.71 kg/m²   General: No acute distress, Alert; in c-collar  Respiratory: No rhonchi, no wheezes  Cardiovascular: S1, S2. Regular rate and rhythm  Abdomen: Soft, NT/ND, +BS  Neuro: No new focal deficits  Extremities: No edema      Results:    Labs:      Labs Last 24 Hours:    Recent Labs   Lab 04/05/24  1831   RBC 4.18   HGB 12.5   HCT 39.9   MCV 95.5   MCH 29.9   MCHC 31.3   RDW 13.1   NEPRELIM 6.21   WBC 8.5   .0       Recent Labs   Lab 04/05/24  1836   *   BUN 31*   CREATSERUM 1.43*   EGFRCR 33*   CA 9.2   ALB 3.9      K 4.9      CO2 26.0   ALKPHO 81   AST 18   ALT 20   BILT 0.5   TP 7.3       No results found for: \"PT\", \"INR\"    Recent Labs   Lab 04/05/24  1836   TROPHS 12       No results for input(s): \"TROP\", \"PBNP\" in the last 168 hours.    No results for input(s): \"PCT\" in the last 168 hours.    Imaging: Imaging data reviewed in Epic.    Assessment & Plan:      #recurrent falls; fall precautions; no PT until cleared by neurosurgery; bed rest for now  #Minimally displaced odontoid fracture & Nondisplaced fracture of the posterior arch of C1.   Probably happened in February when she fell face  first.  #hx lymphoma  #ckd-near baseline  #hx aflutter  #HTN,HL    Keep c-collar in place; bed rest; neurosurgery to see; fall precautions.    Continue home gabapentin dose for now, but would consider reducing gabapentin dose in near future or slowly tapering, in case this is contributing to gait instability.      Quality:  DVT prophylaxis:  SCDs, Lovenox  Code Status: see chart  Mcclain: NO  Mcclain Duration (in days): N/A  Central line: NO  Estimated discharge date: tbd    Plan of care discussed with patient and ER MD Jonathan De La Cruz MD    Supplementary Documentation:

## 2024-04-06 NOTE — PROGRESS NOTES
Occupational Therapy    Orders received and chart review completed. Pt admitted from [place] for frequent falls. Imaging as below, placed in cervical collar, on bed rest until seen by nsgy. Will await nsgy input and initiate therapy as appropriate.     CT cervical spine 4/5  1. Minimally displaced odontoid fracture.   2. Nondisplaced fracture of the posterior arch of C1.    3. Multilevel degenerative changes of the cervical spine with uncovertebral and facet hypertrophy.   4. Thyromegaly with a left left thyroid lobe nodule measuring approximately 6.6 x 5.0 cm.  Consider ultrasound to further evaluate     Xray Ribs w/ chest, left 4/5  1. Mildly displaced lateral 7th through 9th rib fractures.   2. Mild interstitial opacities may represent mild edema.

## 2024-04-06 NOTE — CONSULTS
[facility]   part of Merg[facility] with [facility]    Neurosurgery Consultation    Luana Kraus Patient Status:  Inpatient    1925 MRN [id number]   Location [facility] 3SW-A Attending Rupa Shah,    Hosp Day # 0 PCP No primary care provider on file.     Date of Admission:  2024  Date of Consult:  2024    Reason for Consultation:  Minimally displaced odontoid fracture and nondisplaced fracture of the posterior arch of C1    History of Present Illness:  Luana Kraus is a a(n) 89 year old female with past medical history of atrial flutter, hypertension, hyperlipidemia, macular degeneration, osteoarthritis who currently lives independently in [facility], has had multiple falls over the last few months.  Yesterday patient slid off the couch struck the dresser on the way down into the left rib region.  If you weeks ago patient also stated she fell face first, striking her head and has had neck pain since that time.  Daughter is at bedside to help with history.  She reports no loss of consciousness, weakness, fevers, nausea vomiting diarrhea, cough, chest pain, shortness of breath.     Patient currently has Aspen collar in place and is not endorsing neck pain.  She denies upper or lower extremity weakness, numbness, tingling.  She has had no changes in her bowel or bladder.    History:  Past Medical History:   Diagnosis Date    Atypical atrial flutter (HCC) 2015    Bilateral carotid artery disease (HCC) 2015    Cancer (HCC)     Cataract     Essential hypertension 2015    HLD (hyperlipidemia) 2015    Macular degeneration     Osteoarthritis      Past Surgical History:   Procedure Laterality Date          x3    KNEE REPLACEMENT SURGERY      left     Family History   Problem Relation Age of Onset    Arthritis Mother       reports that she has never smoked. She has never used smokeless tobacco. She reports that she does not drink alcohol and does not use  drugs.    Allergies:  Allergies   Allergen Reactions    Zolpidem RESTLESSNESS and JITTERY       Medications:    Current Facility-Administered Medications:     lidocaine-menthol 4-1 % patch 3 patch, 3 patch, Transdermal, Daily PRN    acetaminophen (Tylenol Extra Strength) tab 500 mg, 500 mg, Oral, Q4H PRN    melatonin tab 3 mg, 3 mg, Oral, Nightly PRN    polyethylene glycol (PEG 3350) (Miralax) 17 g oral packet 17 g, 17 g, Oral, Daily PRN    sennosides (Senokot) tab 17.2 mg, 17.2 mg, Oral, Nightly PRN    bisacodyl (Dulcolax) 10 MG rectal suppository 10 mg, 10 mg, Rectal, Daily PRN    ondansetron (Zofran) 4 MG/2ML injection 8 mg, 8 mg, Intravenous, Q6H PRN    benzonatate (Tessalon) cap 200 mg, 200 mg, Oral, TID PRN    glycerin-hypromellose- (Artificial Tears) 0.2-0.2-1 % ophthalmic solution 1 drop, 1 drop, Both Eyes, QID PRN    sodium chloride (Saline Mist) 0.65 % nasal solution 1 spray, 1 spray, Each Nare, Q3H PRN    heparin (Porcine) 5000 UNIT/ML injection 5,000 Units, 5,000 Units, Subcutaneous, 2 times per day    atorvastatin (Lipitor) tab 10 mg, 10 mg, Oral, Nightly    DULoxetine (Cymbalta) DR cap 20 mg, 20 mg, Oral, Daily    gabapentin (Neurontin) tab 600 mg, 600 mg, Oral, BID    pantoprazole (Protonix) DR tab 20 mg, 20 mg, Oral, QAM AC    lisinopril (Prinivil; Zestril) tab 20 mg, 20 mg, Oral, Daily    gabapentin (Neurontin) tab 1,200 mg, 1,200 mg, Oral, QPM    lidocaine-menthol 4-1 % patch 1 patch, 1 patch, Transdermal, Daily    Review of Systems:  A 10-point system was reviewed.  Pertinent positives and negatives are noted in HPI.      Physical Exam:  Temp:  [97.5 °F (36.4 °C)-98.6 °F (37 °C)] 97.5 °F (36.4 °C)  Pulse:  [68-82] 74  Resp:  [18-22] 18  BP: (145-165)/(61-85) 145/82  SpO2:  [90 %-99 %] 91 %  I/O last 3 completed shifts:  In: 240 [P.O.:240]  Out: 100 [Urine:100]      Neurological Exam:  Strength:  Moving all extremities x 4 and symmetrically    Sensation:  Intact in bilateral upper and lower  extremities throughout    DTRs:  Diminished in upper and lower extremities and symmetric    Long tract signs  Negative Amada in bilateral upper extremities  Clonus absent bilateral lower     Abdomen:  Soft, non-distended, non-tender, with no rebound or guarding.  No peritoneal signs.     Extremities:  Non-tender, no lower extremity edema noted.      Labs:  Lab Results   Component Value Date    WBC 7.0 04/06/2024    HGB 12.7 04/06/2024    .0 04/06/2024    BUN 25 (H) 04/06/2024     04/06/2024    K 4.2 04/06/2024    CO2 26.0 04/06/2024     (H) 04/06/2024    ALB 3.9 04/05/2024    CRP <0.29 03/10/2017    ESRML 10 03/10/2017       Imaging:  CT SPINE CERVICAL (CPT=72125)    Result Date: 4/5/2024  CONCLUSION:  1. Minimally displaced odontoid fracture. 2. Nondisplaced fracture of the posterior arch of C1.  3. Multilevel degenerative changes of the cervical spine with uncovertebral and facet hypertrophy. 4. Thyromegaly with a left left thyroid lobe nodule measuring approximately 6.6 x 5.0 cm.  Consider ultrasound to further evaluate 5. This critical value result was called to Dr. Cid on 4/5/2024 at 8:25 p.m..  Results read back was performed.      LOCATION:  [facility]   Dictated by (CST): Jaleesa Barrow MD on 4/05/2024 at 8:23 PM     Finalized by (CST): Jaleesa Barrow MD on 4/05/2024 at 8:26 PM       CT BRAIN OR HEAD (64731)    Result Date: 4/5/2024  CONCLUSION: 1. No acute intracranial findings 2. Cerebral atrophy with chronic microvascular ischemic changes. 3. Nondisplaced fracture of the posterior arch of C1.     LOCATION:  [facility]   Dictated by (CST): Jaleesa Barrow MD on 4/05/2024 at 8:20 PM     Finalized by (CST): Jaleesa Barrow MD on 4/05/2024 at 8:23 PM       XR RIBS WITH CHEST (3 VIEWS), LEFT  (CPT=71101)    Result Date: 4/5/2024  CONCLUSION:  1. Mildly displaced lateral 7th through 9th rib fractures. 2. Mild interstitial opacities may represent mild edema.    LOCATION:  [facility]      Dictated by (CST): Jaleesa Barrow MD on 4/05/2024 at 7:36 PM     Finalized by (CST): Jaleesa Barrow MD on 4/05/2024 at 7:37 PM           Assessment/Plan:  Principal Problem:    Closed nondisplaced fracture of first cervical vertebra, unspecified fracture morphology, initial encounter ([facility])  Active Problems:    Closed fracture of multiple ribs    Closed nondisplaced fracture of second cervical vertebra, unspecified fracture morphology, initial encounter ([facility])    Closed fracture of multiple ribs of left side, initial encounter    Frequent falls    Ms. Kraus is a 89-year-old female who presented to the emergency department for frequent falls.  Patient found to have multiple left-sided rib fractures from fall yesterday as well as a minimally displaced odontoid fracture and nondisplaced fracture of the posterior arch of C1 which happened likely a few months ago.  Currently patient is neurologically intact on exam.  She has Aspen collar in place.  [place] collar will remain in place at all times for the next 3 months  PT/OT  Medical management per hospitalist team  Pain control  DVT prophylaxis  Follow-up instructions placed with Jose Palma PA-C in 3 months with a repeat CT cervical spine at [facility].  If patient would like to be seen at [facility] she can call the office to reschedule.  No acute intervention per neurosurgery    More than 45 minutes were spent in consultation and coordination of this patient's care. All questions and concerns were addressed. We appreciate the opportunity to participate in the care of this patient. Please do not hesitate to call our office ([phone]) with any issues.    Jose Palma PA-C  4/6/2024  10:55 AM

## 2024-04-06 NOTE — PHYSICAL THERAPY NOTE
PHYSICAL THERAPY EVALUATION - INPATIENT     Room Number: 385/385-A  Evaluation Date: 4/6/2024  Type of Evaluation: Initial  Physician Order: PT Eval and Treat    Presenting Problem: closed nondisplaced fracture of first cervical vertebra and L rib fracture  Co-Morbidities : Aflutter, HL, macular degeneration, OA  Reason for Therapy: Mobility Dysfunction and Discharge Planning    PHYSICAL THERAPY ASSESSMENT   Patient is currently functioning below baseline with bed mobility, transfers, gait, stair negotiation, maintaining seated position, and standing prolonged periods.  Prior to admission, patient's baseline is Mod I with ADL, ambulation with RW to dining andrea.  Patient is requiring minimal assist as a result of the following impairments: decreased functional strength, decreased endurance/aerobic capacity, pain, impaired sitting/standing balance, decreased muscular endurance, and difficulty maintaining precautions.  Physical Therapy will continue to follow for duration of hospitalization.    Patient will benefit from continued skilled PT Services to promote return to prior level of function and safety with continuous assistance and gradual rehabilitative therapy .    PLAN  PT Treatment Plan: Bed mobility;Body mechanics;Endurance;Energy conservation;Don/doff brace;Family education;Patient education;Gait training;Transfer training;Balance training  Rehab Potential : Good  Frequency (Obs): 3x/week  Number of Visits to Meet Established Goals: 5      CURRENT GOALS    Goal #1 Patient is able to demonstrate supine - sit EOB @ level: supervision     Goal #2 Patient is able to demonstrate transfers Sit to/from Stand at assistance level: supervision     Goal #3 Patient is able to ambulate 150 feet with assist device: walker - rolling at assistance level: supervision     Goal #4    Goal #5    Goal #6    Goal Comments: Goals established on 4/6/2024      PHYSICAL THERAPY MEDICAL/SOCIAL HISTORY  History related to current  admission: Patient is a 89 year old female admitted on 4/5/2024 from home for s/p sliding out of her chair at home.  Pt diagnosed with minimally displaced odontoid fracture and nondisplaced fracture of the posterior arch of C1, L rib fractures.      HOME SITUATION  Type of Home: Independent living facility ([facility])   Home Layout: One level                Lives With: Staff 24 hours  Drives: No  Patient Owned Equipment: Rolling walker  Patient Regularly Uses: Glasses    Prior Level of [place]: Mod I for ADL and IADL, ambulates with RW to dining andrea.    SUBJECTIVE  Pt is pleasant and cooperative.       OBJECTIVE  Precautions: Spine;Cervical brace;Bed/chair alarm (Aspen Collar)  Fall Risk: High fall risk    WEIGHT BEARING RESTRICTION                   PAIN ASSESSMENT  Rating: Unable to rate  Location: neck pain  Management Techniques: Activity promotion;Relaxation;Repositioning    COGNITION  Overall Cognitive Status:  WFL - within functional limits    RANGE OF MOTION AND STRENGTH ASSESSMENT  Upper extremity ROM and strength are within functional limits See OT for evaluation    Lower extremity ROM is within functional limits     Lower extremity strength is within functional limits       BALANCE  Static Sitting: Fair  Dynamic Sitting: Fair -  Static Standing: Poor  Dynamic Standing: Poor -    ADDITIONAL TESTS                                    ACTIVITY TOLERANCE  Pulse: 98  Heart Rate Source: Monitor                   O2 WALK  Oxygen Therapy  SPO2% on Room Air at Rest: 98    NEUROLOGICAL FINDINGS                        AM-PAC '6-Clicks' INPATIENT SHORT FORM - BASIC MOBILITY  How much difficulty does the patient currently have...  Patient Difficulty: Turning over in bed (including adjusting bedclothes, sheets and blankets)?: A Lot   Patient Difficulty: Sitting down on and standing up from a chair with arms (e.g., wheelchair, bedside commode, etc.): A Little   Patient Difficulty: Moving from lying on back to  sitting on the side of the bed?: A Lot   How much help from another person does the patient currently need...   Help from Another: Moving to and from a bed to a chair (including a wheelchair)?: A Lot   Help from Another: Need to walk in hospital room?: A Little   Help from Another: Climbing 3-5 steps with a railing?: A Lot       AM-PAC Score:  Raw Score: 14   Approx Degree of Impairment: 61.29%   Standardized Score (AM-PAC Scale): 38.1   CMS Modifier (G-Code): CL    FUNCTIONAL ABILITY STATUS  Gait Assessment   Functional Mobility/Gait Assessment  Gait Assistance: Minimum assistance  Distance (ft): 75  Assistive Device: Rolling walker  Pattern: Shuffle    Skilled Therapy Provided     Bed Mobility:  Rolling: Mod A  Supine to sit: Mod A   Sit to supine: NT     Transfer Mobility:  Sit to stand: Min A   Stand to sit: Min A  Gait = Min A with RW    Therapist's Comments: pt lying in bed and agreeable to PT. Vitals assessed and WNL throughout session. Pt educated and instructed in spine precautions and how these are integrated with functional activities. Pt instructed in proper body mechanics for bed mobility to include log rolling. Pt cued for proper breathing techniques throughout. Pt instructed in proper hand placement and integration of RW with transfers and ambulation. Pt cued for upright posture in standing and during ambulation and to keep RW within ROSCOE. Pt left in chair with all needs in reach, alarm on. RN notified.    Returned for pm session to instructed pt in how to yosef/doff aspen collar and ensure proper fit. Inc time spent on education and discussion with pt regarding the importance of safety awareness, fall precautions, activity level and pacing, and positioning. Pt in understanding. Pt is to wear aspen collar at all times for 3 months.       Exercise/Education Provided:  Bed mobility  Body mechanics  Energy conservation  Functional activity tolerated  Gait training  Transfer training    Patient End of  Session: Up in chair;Needs met;Call light within reach;RN aware of session/findings;All patient questions and concerns addressed;Bracing education provided per handout;Alarm set;Family present;Discussed recommendations with /      Patient Evaluation Complexity Level:  History High - 3 or more personal factors and/or co-morbidities   Examination of body systems Moderate - addressing a total of 3 or more elements   Clinical Presentation Low - Stable   Clinical Decision Making Low - Stable       PT Session Time: 45 minutes  Gait Training: 10 minutes  Therapeutic Activity: 20 minutes  Neuromuscular Re-education: 0 minutes  Therapeutic Exercise: 0 minutes

## 2024-04-06 NOTE — PROGRESS NOTES
[facility]   part of [facility]     Hospitalist Progress Note     Luana Kraus Patient Status:  Inpatient    1925 MRN [id number]   Location [facility] 3SW-A Attending Rupa Shah,    Hosp Day # 0 PCP No primary care provider on file.     Chief Complaint: Fall    Subjective:     Patient resting in bed with c-collar on.  She reports no pain and feels well    Objective:    Review of Systems:   A comprehensive review of systems was completed; pertinent positive and negatives stated in subjective.    Vital signs:  Temp:  [97.5 °F (36.4 °C)-98.6 °F (37 °C)] 98.1 °F (36.7 °C)  Pulse:  [68-82] 69  Resp:  [18-22] 18  BP: (120-165)/(61-85) 120/66  SpO2:  [90 %-99 %] 96 %    Physical Exam:    General: No acute distress, c-collar in place  Respiratory: No wheezes, no rhonchi  Cardiovascular: S1, S2, regular rate and rhythm  Abdomen: Soft, Non-tender, non-distended, positive bowel sounds  Neuro: No new focal deficits.   Extremities: No edema      Diagnostic Data:    Labs:  Recent Labs   Lab 24  18324  0542   WBC 8.5 7.0   HGB 12.5 12.7   MCV 95.5 92.1   .0 180.0       Recent Labs   Lab 246 24  0542   * 130*   BUN 31* 25*   CREATSERUM 1.43* 1.08*   CA 9.2 8.7   ALB 3.9  --     142   K 4.9 4.2    113*   CO2 26.0 26.0   ALKPHO 81  --    AST 18  --    ALT 20  --    BILT 0.5  --    TP 7.3  --        Estimated Creatinine Clearance: 25.1 mL/min (A) (based on SCr of 1.08 mg/dL (H)).    Recent Labs   Lab 24  1836   TROPHS 12       No results for input(s): \"PTP\", \"INR\" in the last 168 hours.               Microbiology    No results found for this visit on 24.      Imaging: Reviewed in Epic.    Medications:    heparin  5,000 Units Subcutaneous 2 times per day    atorvastatin  10 mg Oral Nightly    DULoxetine  20 mg Oral Daily    gabapentin  600 mg Oral BID    pantoprazole  20 mg Oral QAM AC    lisinopril  20 mg Oral Daily    gabapentin   1,200 mg Oral QPM    lidocaine-menthol  1 patch Transdermal Daily       Assessment & Plan:      #Fall  #Nondisplaced C1 posterior arch fracture  -CT C-spine reviewed and shows C1 fracture  -C-collar in place; will remain in place at all times for the next 3 months per neurosurgery recommendations  -Pain control as needed  -Neurosurgery following; plan for outpatient follow-up in 3 months with repeat CT cervical spine  -PT OT consulted    #History of lymphoma    #CKD    #History of atrial flutter    #Hypertension    #Hyperlipidemia  -Statin      Rupa Shah DO    Supplementary Documentation:     Quality:  DVT Mechanical Prophylaxis:   SCDs,    DVT Pharmacologic Prophylaxis   Medication    heparin (Porcine) 5000 UNIT/ML injection 5,000 Units                Code Status: Not on file  Mcclain: External urinary catheter in place  Mcclain Duration (in days):   Central line:    RADHA:     Discharge is dependent on: Clinical improvement   At this point Ms. Kraus is expected to be discharge to: TBD    The 21st Century Cures Act makes medical notes like these available to patients in the interest of transparency. Please be advised this is a medical document. Medical documents are intended to carry relevant information, facts as evident, and the clinical opinion of the practitioner. The medical note is intended as peer to peer communication and may appear blunt or direct. It is written in medical language and may contain abbreviations or verbiage that are unfamiliar.             **Certification      PHYSICIAN Certification of Need for Inpatient Hospitalization - Initial Certification    Patient will require inpatient services that will reasonably be expected to span two midnight's based on the clinical documentation in H+P.   Based on patients current state of illness, I anticipate that, after discharge, patient will require TBD.

## 2024-04-07 LAB
ATRIAL RATE: 64 BPM
P AXIS: 20 DEGREES
P-R INTERVAL: 236 MS
Q-T INTERVAL: 438 MS
QRS DURATION: 136 MS
QTC CALCULATION (BEZET): 451 MS
R AXIS: -75 DEGREES
T AXIS: -9 DEGREES
VENTRICULAR RATE: 64 BPM

## 2024-04-07 PROCEDURE — 99232 SBSQ HOSP IP/OBS MODERATE 35: CPT | Performed by: STUDENT IN AN ORGANIZED HEALTH CARE EDUCATION/TRAINING PROGRAM

## 2024-04-07 RX ORDER — QUETIAPINE FUMARATE 25 MG/1
50 TABLET, FILM COATED ORAL NIGHTLY
Status: DISCONTINUED | OUTPATIENT
Start: 2024-04-07 | End: 2024-04-08

## 2024-04-07 NOTE — PLAN OF CARE
Patient A&Ox3-4. VSS on RA. . Tele-NSR. Patient reports mild pain, declines pain meds at this time. Patient denies any N/T at this time. Aspen collar in place, to be worn at all times. Ambulates with x1 assist and walker. Voiding freely. LBM: 4/4. Regular diet. Plan for possible [facility] placement pending case management review. Updated patient on plan of care, verbalizes understanding. Call light within reach. Safety precautions in place.    2130: AGUSTIN alert called d/t patient confused and combative. Attempted to re-orient pt without success. Family notified. Family member to come and remain at bedside. Pt re-oriented and calm.

## 2024-04-07 NOTE — PLAN OF CARE
Patient alert and oriented x4, forgetful at times. VSS on RA. Tele in NSR. Pain controlled with PO Prn meds. Denies n/t. Aspen collar on and aligned at all times. Abrasion to left back. SCDs in place, ankle pumps encouraged, subcutaneous heparin. Pt up x1 and the walker. Tolerating diet, no c/o n/v. Voiding freely in bathroom.     Plan: discharge to [facility] when cleared by all services

## 2024-04-07 NOTE — PROGRESS NOTES
[facility]   part of [facility]     Hospitalist Progress Note     Luana Kraus Patient Status:  Inpatient    1925 MRN [id number]   Location [facility] 3SW-A Attending Rupa Shah,    Hosp Day # 1 PCP No primary care provider on file.     Chief Complaint: Fall    Subjective:     Patient resting in chair with c-collar on.  She reports no pain and feels well    Objective:    Review of Systems:   A comprehensive review of systems was completed; pertinent positive and negatives stated in subjective.    Vital signs:  Temp:  [97.3 °F (36.3 °C)-98.3 °F (36.8 °C)] 98 °F (36.7 °C)  Pulse:  [69-98] 77  Resp:  [16-18] 18  BP: (120-161)/(58-68) 158/66  SpO2:  [91 %-96 %] 92 %    Physical Exam:    General: No acute distress, c-collar in place  Respiratory: No wheezes, no rhonchi  Cardiovascular: S1, S2, regular rate and rhythm  Abdomen: Soft, Non-tender, non-distended, positive bowel sounds  Neuro: No new focal deficits.   Extremities: No edema      Diagnostic Data:    Labs:  Recent Labs   Lab 24  18324  0542   WBC 8.5 7.0   HGB 12.5 12.7   MCV 95.5 92.1   .0 180.0       Recent Labs   Lab 246 24  0542   * 130*   BUN 31* 25*   CREATSERUM 1.43* 1.08*   CA 9.2 8.7   ALB 3.9  --     142   K 4.9 4.2    113*   CO2 26.0 26.0   ALKPHO 81  --    AST 18  --    ALT 20  --    BILT 0.5  --    TP 7.3  --        Estimated Creatinine Clearance: 25.1 mL/min (A) (based on SCr of 1.08 mg/dL (H)).    Recent Labs   Lab 24  1836   TROPHS 12       No results for input(s): \"PTP\", \"INR\" in the last 168 hours.               Microbiology    No results found for this visit on 24.      Imaging: Reviewed in Epic.    Medications:    QUEtiapine  50 mg Oral Nightly    heparin  5,000 Units Subcutaneous 2 times per day    atorvastatin  10 mg Oral Nightly    DULoxetine  20 mg Oral Daily    gabapentin  600 mg Oral BID    pantoprazole  20 mg Oral QAM AC    lisinopril   20 mg Oral Daily    gabapentin  1,200 mg Oral QPM    lidocaine-menthol  1 patch Transdermal Daily    haloperidol lactate  1 mg Intravenous Once       Assessment & Plan:      #Fall  #Nondisplaced C1 posterior arch fracture  -CT C-spine reviewed and shows C1 fracture  -C-collar in place; will remain in place at all times for the next 3 months per neurosurgery recommendations  -Pain control as needed  -Neurosurgery following; plan for outpatient follow-up in 3 months with repeat CT cervical spine  -PT OT recommend ELISABETH    #Sundowning  -Seroquel    #History of lymphoma    #CKD    #History of atrial flutter    #Hypertension    #Hyperlipidemia  -Statin      Rupa Shah,     Supplementary Documentation:     Quality:  DVT Mechanical Prophylaxis:   SCDs,    DVT Pharmacologic Prophylaxis   Medication    heparin (Porcine) 5000 UNIT/ML injection 5,000 Units                Code Status: Not on file  Mcclain: External urinary catheter in place  Mcclain Duration (in days):   Central line:    RADHA:     Discharge is dependent on: Clinical improvement   At this point Ms. Kraus is expected to be discharge to: TBD    The 21st Century Cures Act makes medical notes like these available to patients in the interest of transparency. Please be advised this is a medical document. Medical documents are intended to carry relevant information, facts as evident, and the clinical opinion of the practitioner. The medical note is intended as peer to peer communication and may appear blunt or direct. It is written in medical language and may contain abbreviations or verbiage that are unfamiliar.             **Certification      PHYSICIAN Certification of Need for Inpatient Hospitalization - Initial Certification    Patient will require inpatient services that will reasonably be expected to span two midnight's based on the clinical documentation in H+P.   Based on patients current state of illness, I anticipate that, after discharge, patient will  require TBD.

## 2024-04-07 NOTE — CM/SW NOTE
Eduardo spoke to daughter Kaylin about dc planning for [facility].  She would like [facility] and did not feel she needed list from [facility].  She is aware that pt's admit date was 4/6 at 0008. Would need to be in hospital until Tuesday for Medicare coverage.  If pt does not meet 3 midnight, daughter agreeable to private pay rate of $323 per day at [facility].  PASSR done and [facility] review requested.    Blanca Dove, TIANNA  /Discharge Planner

## 2024-04-07 NOTE — CM/SW NOTE
04/07/24 0900   CM/SW Referral Data   Referral Source Social Work (self-referral)   Reason for Referral Discharge planning   Informant EMR     Pt admitted to [facility] from [facility].  Anticipated therapy need: Gradual Rehabilitative Therapy  [facility] referral started in aidin.  Will need PASSR. Message left for [facility]-pt does not  have 3 midnights for Medicare coverage for [facility].      Per PT:      PHYSICAL THERAPY MEDICAL/SOCIAL HISTORY  History related to current admission: Patient is a 89 year old female admitted on 4/5/2024 from home for s/p sliding out of her chair at home.  Pt diagnosed with minimally displaced odontoid fracture and nondisplaced fracture of the posterior arch of C1, L rib fractures.        HOME SITUATION  Type of Home: Independent living facility ([facility])   Home Layout: One level     Lives With: Staff 24 hours  Drives: No  Patient Owned Equipment: Rolling walker  Patient Regularly Uses: Glasses     Prior Level of [place]: Mod I for ADL and IADL, ambulates with RW to dining andrea.

## 2024-04-08 ENCOUNTER — APPOINTMENT (OUTPATIENT)
Dept: GENERAL RADIOLOGY | Facility: HOSPITAL | Age: 89
End: 2024-04-08
Attending: STUDENT IN AN ORGANIZED HEALTH CARE EDUCATION/TRAINING PROGRAM
Payer: MEDICARE

## 2024-04-08 PROBLEM — J96.01 ACUTE RESPIRATORY FAILURE WITH HYPOXIA (HCC): Status: ACTIVE | Noted: 2024-04-08

## 2024-04-08 PROCEDURE — 71045 X-RAY EXAM CHEST 1 VIEW: CPT | Performed by: STUDENT IN AN ORGANIZED HEALTH CARE EDUCATION/TRAINING PROGRAM

## 2024-04-08 PROCEDURE — 99232 SBSQ HOSP IP/OBS MODERATE 35: CPT | Performed by: STUDENT IN AN ORGANIZED HEALTH CARE EDUCATION/TRAINING PROGRAM

## 2024-04-08 RX ORDER — FUROSEMIDE 10 MG/ML
20 INJECTION INTRAMUSCULAR; INTRAVENOUS ONCE
Status: COMPLETED | OUTPATIENT
Start: 2024-04-08 | End: 2024-04-08

## 2024-04-08 RX ORDER — QUETIAPINE FUMARATE 25 MG/1
25 TABLET, FILM COATED ORAL NIGHTLY
Status: DISCONTINUED | OUTPATIENT
Start: 2024-04-08 | End: 2024-04-09

## 2024-04-08 RX ORDER — ALBUTEROL SULFATE 2.5 MG/3ML
2.5 SOLUTION RESPIRATORY (INHALATION) 4 TIMES DAILY PRN
Status: DISCONTINUED | OUTPATIENT
Start: 2024-04-08 | End: 2024-04-09

## 2024-04-08 NOTE — PHYSICAL THERAPY NOTE
PHYSICAL THERAPY TREATMENT NOTE - INPATIENT    Room Number: 385/385-A     Session: 1     Number of Visits to Meet Established Goals: 5    Presenting Problem: closed nondisplaced fracture of first cervical vertebra  Co-Morbidities : Aflutter, HL, macular degeneration, OA    ASSESSMENT   Patient demonstrates limited progress this session, goals  remain in progress.    Patient continues to function below baseline with bed mobility, transfers, and gait.  Contributing factors to remaining limitations include decreased functional strength and impaired coordination.  Next session anticipate patient to progress bed mobility, transfers, and gait.  Physical Therapy will continue to follow patient for duration of hospitalization.    Patient continues to benefit from continued skilled PT services: to promote return to prior level of function and safety with continuous assistance and gradual rehabilitative therapy .    PLAN  PT Treatment Plan: Bed mobility;Body mechanics;Endurance;Energy conservation;Don/doff brace;Family education;Patient education;Gait training;Transfer training;Balance training  Rehab Potential : Good  Frequency (Obs): 3x/week    CURRENT GOALS     Goal #1 Patient is able to demonstrate supine - sit EOB @ level: supervision      Goal #2 Patient is able to demonstrate transfers Sit to/from Stand at assistance level: supervision      Goal #3 Patient is able to ambulate 150 feet with assist device: walker - rolling at assistance level: supervision      Goal #4     Goal #5     Goal #6     Goal Comments: Goals established on 4/6/2024 4/8/2024 all goals ongoing    SUBJECTIVE  \"My neck hurts\"    OBJECTIVE  Precautions: Spine;Cervical brace;Bed/chair alarm (Aspen Collar)    WEIGHT BEARING RESTRICTION                   PAIN ASSESSMENT   Rating: Unable to rate  Location: neck and LBP  Management Techniques: Activity promotion;Body mechanics;Repositioning    BALANCE                                                                                                                        Static Sitting: Fair -  Dynamic Sitting: Poor +           Static Standing: Poor  Dynamic Standing: Poor -    ACTIVITY TOLERANCE                         O2 WALK         AM-PAC '6-Clicks' INPATIENT SHORT FORM - BASIC MOBILITY  How much difficulty does the patient currently have...  Patient Difficulty: Turning over in bed (including adjusting bedclothes, sheets and blankets)?: A Lot   Patient Difficulty: Sitting down on and standing up from a chair with arms (e.g., wheelchair, bedside commode, etc.): A Lot   Patient Difficulty: Moving from lying on back to sitting on the side of the bed?: A Lot   How much help from another person does the patient currently need...   Help from Another: Moving to and from a bed to a chair (including a wheelchair)?: A Lot   Help from Another: Need to walk in hospital room?: A Lot   Help from Another: Climbing 3-5 steps with a railing?: Total       AM-PAC Score:  Raw Score: 11   Approx Degree of Impairment: 72.57%   Standardized Score (AM-PAC Scale): 33.86   CMS Modifier (G-Code): CL    FUNCTIONAL ABILITY STATUS  Gait Assessment   Functional Mobility/Gait Assessment  Gait Assistance: Moderate assistance  Distance (ft): 1 (to  commode (mod A x2))  Assistive Device: Rolling walker  Pattern: R Flexed knee;L Flexed knee (Fercho knee buckle)    Skilled Therapy Provided  Pt presents in semi sup  Pt required extensive cuing for bed mobility with hand placement and body mechanics  Pt educated on log roll sequencing and spine precautions.   Pt required cuing on body awareness and hand placement when transferring to [facility]  Noted fercho knee buckle, requiring L knee to be blocked while standing at [facility] for tracy care c max A     Pt BP monitored due to pt feeling dizziness when t/f to EOB    Bed Mobility:  Rolling: Mod A x1   Supine<>Sit: Mod A x1   Sit<>Supine: NT     Transfer Mobility:  Sit<>Stand: Mod A x2   Stand<>Sit: Mod A  x2   Gait:  Mod A x2 - to BS commode -  (bed>BS commode; commode>BS chair) chair brought up behind pt 2/2 B knee buckle  PCT made aware of recs to use lift for safe t/f back to bed     Therapist's Comments: Pt BP monitored 122/60 - taken on R arm      THERAPEUTIC EXERCISES  Lower Extremity Alternating marching  Ankle pumps     Upper Extremity Elbow flex/ext     Position Sitting     Repetitions   10   Sets   1     Patient End of Session: Up in chair;Needs met;Call light within reach;RN aware of session/findings;Alarm set    PT Session Time: 30 minutes  Gait Trainin minutes  Therapeutic Activity: 15 minutes  Therapeutic Exercise: 10 minutes   Neuromuscular Re-education: 0 minutes

## 2024-04-08 NOTE — PLAN OF CARE
Patient A&Ox3-4, occasionally forgetful. VSS on 2L NC. IS encouraged. . Tele-NSR. Patient reports moderate pain, see MAR for meds given. Patient denies any N/T at this time. Aspen collar in place, to be worn at all times. Ambulates with x1 assist and walker. Voiding freely. LBM: 4/7. Regular diet. Plan for [facility] placement when cleared and DC planning. Updated patient on plan of care, verbalizes understanding. Call light within reach. Safety precautions in place.

## 2024-04-08 NOTE — CONGREGATE LIVING REVIEW
[facility] Living Authorization    The [facility] Review Committee has reviewed this case and the patient IS APPROVED for discharge to a facility for Short Term Skilled once the following procedure is followed:     - The physician discharge instructions (contained within the IESHA note for SNF) must inlcude the below appropriate and approved COVID instructions to the facility    For questions regarding CLRC approval process, please contact the CM assigned to the case.  For questions regarding RN discharge workflow, please contact the unit Clinical Leader.

## 2024-04-08 NOTE — OCCUPATIONAL THERAPY NOTE
OCCUPATIONAL THERAPY TREATMENT NOTE - INPATIENT     Room Number: 385/385-A  Session: 1   Number of Visits to Meet Established Goals: 5    Presenting Problem: Falls; Minimally displaced odontoid fracture & Nondisplaced fracture of the posterior arch of C1; L rib fractures  Prior Level of Function: Pt lives alone at [facility] and is typically MOD I with ADLs and mobility with RW. Pt makes her own simple breakfast and lunch in her apartment and walks to dining andrea for dinner. Pt has cleaning assist but does her own laundry. Pt and daughter endorse at least 3 falls in the last month.     ASSESSMENT   Patient demonstrates fair progress this session, goals remain in progress.    Patient continues to function below baseline with toileting, lower body dressing, bed mobility, transfers, static standing balance, dynamic standing balance, and functional standing tolerance.   Contributing factors to remaining limitations include decreased functional strength, decreased endurance, pain, impaired standing balance, impaired coordination, impaired motor planning, and decreased muscular endurance.  Next session anticipate patient to progress toileting, lower body dressing, bed mobility, transfers, static standing balance, dynamic standing balance, and functional standing tolerance.  Occupational Therapy will continue to follow patient for duration of hospitalization.    Patient continues to benefit from continued skilled OT services: to promote return to prior level of function and safety with continuous assistance and gradual rehabilitative therapy .               History: Patient is a 89 year old female admitted on 4/5/2024 from \[facility]\"" for multiple falls. Imaging revealed nondisplaced fracture of posterior arch of C1, which likely happened a few months ago per nsgy and she is to wear Aspen collar at all times for 3 months. Pt also has multiple left-sided rib fractures, likely from fall yesterday.      Imaging:  CT SPINE  CERVICAL  4/5/2024  1. Minimally displaced odontoid fracture. 2. Nondisplaced fracture of the posterior arch of C1.  3. Multilevel degenerative changes of the cervical spine with uncovertebral and facet hypertrophy. 4. Thyromegaly with a left left thyroid lobe nodule measuring approximately 6.6 x 5.0 cm.  Consider ultrasound to further evaluate      CT BRAIN OR HEAD 4/5/2024  1. No acute intracranial findings 2. Cerebral atrophy with chronic microvascular ischemic changes. 3. Nondisplaced fracture of the posterior arch of C1.        XR RIBS WITH CHEST, LEFT 4/5/2024  1. Mildly displaced lateral 7th through 9th rib fractures. 2. Mild interstitial opacities may represent mild edema.     Co-Morbidities : Aflutter, HL, macular degeneration, OA    WEIGHT BEARING RESTRICTION                   Recommendations for nursing staff:   Transfers: ivan mccoy  Toileting location: bedside commode    TREATMENT SESSION:  Patient Start of Session: semi supine in bed  FUNCTIONAL TRANSFER ASSESSMENT  Sit to Stand: Edge of Bed  Edge of Bed: Maximum Assist (mod A x 2)  Toilet Transfer: Not Tested  Commode Transfer: Maximum Assist (mod A x 2)    BED MOBILITY  Rolling: Moderate Assist  Supine to Sit : Moderate Assist  Sit to Supine (OT): Not Tested  Scooting: Min A    BALANCE ASSESSMENT  Static Sitting: Minimal Assist  Sitting Bilateral: Minimal Assist  Static Standing: Moderate Assist  Standing Unilateral: Moderate Assist    FUNCTIONAL ADL ASSESSMENT  Eating: Not Tested  Grooming Seated: Not Tested  LB Dressing Seated: Maximum Assist  LB Dressing Standing: Maximum Assist  Toileting Standing: Maximum Assist      ACTIVITY TOLERANCE: fair                         O2 SATURATIONS       EDUCATION PROVIDED  Patient: Role of Occupational Therapy; Plan of Care; Discharge Recommendations; Functional Transfer Techniques; Fall Prevention; Surgical Precautions; Posture/Positioning; Energy Conservation; Proper Body Mechanics  Patient's Response to  Education: Verbalized Understanding; Requires Further Education; Demonstrates Poor Carry Over to Information  Family/Caregiver: Role of Occupational Therapy; Plan of Care; Adaptive Equipment Recommendations; DME Recommendations; Functional Transfer Techniques; Fall Prevention; Posture/Positioning; Compensatory ADL Techniques; Proper Body Mechanics; Bracing Education Provided  Family/Caregiver's Response to Education: Verbalized Understanding      Equipment used: RW  Demonstrates functional use, Would benefit from additional trial     Therapist comments: Pt requires mod A to sit EOB with max cues provided for log rolling sequence and adherence to cervical spine precautions. Sit to stand transfers performed at mod A x 2 with max cues provided for hand placement and sequencing. Pt transfers to bedside commode with RW requiring mod A x 2 and required max A for toileting hygiene. Pt requires L knee block for tracy-care hygiene in standing. Bedside chair was brought behind pt due to bilat knee buckle and decreased standing tolerance.   Patient End of Session: Up in chair;Needs met;Call light within reach;RN aware of session/findings;All patient questions and concerns addressed;Alarm set    SUBJECTIVE  Pt pleasant and cooperative for OT.    PAIN ASSESSMENT  Rating: 3  Location: lower jaw  Management Techniques: Activity promotion;Body mechanics;Breathing techniques     OBJECTIVE  Precautions: Spine;Cervical brace;Bed/chair alarm (Aspen Collar)    AM-PAC ‘6-Clicks’ Inpatient Daily Activity Short Form  -   Putting on and taking off regular lower body clothing?: A Lot  -   Bathing (including washing, rinsing, drying)?: A Lot  -   Toileting, which includes using toilet, bedpan or urinal? : A Lot  -   Putting on and taking off regular upper body clothing?: A Little  -   Taking care of personal grooming such as brushing teeth?: None  -   Eating meals?: None    AM-PAC Score:  Score: 17  Approx Degree of Impairment:  50.11%  Standardized Score (AM-PAC Scale): 37.26    PLAN  OT Treatment Plan: Balance activities;Energy conservation/work simplification techniques;ADL training;Functional transfer training;Endurance training;Patient/Family education;Patient/Family training;Equipment eval/education;Compensatory technique education  Rehab Potential : Good  Frequency: 3-5x/week    OT Goals:     All goals ongoing 04/08    ADL Goals   Patient will perform grooming: with stand by assist and while standing at sink  Patient will perform upper body dressing:  with stand by assist  Patient will perform lower body dressing:  with stand by assist and with adaptive equipment PRN  Patient will perform toileting: with stand by assist     Functional Transfer Goals  Patient will transfer from supine to sit:  with stand by assist  Patient will transfer from sit to stand:  with stand by assist  Patient will transfer to toilet:  with supervision     Additional Goals  Pt will don/doff cervical collar with supervision  Pt will verbalize precautions and independently adhere to precautions during ADL tasks    OT Session Time: 25 minutes  Self-Care Home Management: 25 minutes

## 2024-04-08 NOTE — PROGRESS NOTES
[facility]   part of [facility]     Hospitalist Progress Note     Luana Kraus Patient Status:  Inpatient    1925 MRN [id number]   Location [facility] 3SW-A Attending Rupa Shah,    Hosp Day # 2 PCP No primary care provider on file.     Chief Complaint: Fall    Subjective:     Patient placed on oxygen overnight and developed cough.  Patient resting in chair with c-collar on.  He has no complaints at this time.    Objective:    Review of Systems:   A comprehensive review of systems was completed; pertinent positive and negatives stated in subjective.    Vital signs:  Temp:  [97.8 °F (36.6 °C)-98.6 °F (37 °C)] 98.6 °F (37 °C)  Pulse:  [50-70] 63  Resp:  [16-20] 20  BP: ()/(44-72) 156/60  SpO2:  [91 %-95 %] 95 %    Physical Exam:    General: No acute distress, c-collar in place  Respiratory: No wheezes, no rhonchi  Cardiovascular: S1, S2, regular rate and rhythm  Abdomen: Soft, Non-tender, non-distended, positive bowel sounds  Neuro: No new focal deficits.   Extremities: No edema      Diagnostic Data:    Labs:  Recent Labs   Lab 24  0542   WBC 8.5 7.0   HGB 12.5 12.7   MCV 95.5 92.1   .0 180.0       Recent Labs   Lab 24  0542   * 130*   BUN 31* 25*   CREATSERUM 1.43* 1.08*   CA 9.2 8.7   ALB 3.9  --     142   K 4.9 4.2    113*   CO2 26.0 26.0   ALKPHO 81  --    AST 18  --    ALT 20  --    BILT 0.5  --    TP 7.3  --        Estimated Creatinine Clearance: 25.1 mL/min (A) (based on SCr of 1.08 mg/dL (H)).    Recent Labs   Lab 24   TROPHS 12       No results for input(s): \"PTP\", \"INR\" in the last 168 hours.               Microbiology    No results found for this visit on 24.      Imaging: Reviewed in Epic.    Medications:    QUEtiapine  25 mg Oral Nightly    heparin  5,000 Units Subcutaneous 2 times per day    atorvastatin  10 mg Oral Nightly    DULoxetine  20 mg Oral Daily    gabapentin  600 mg Oral  BID    pantoprazole  20 mg Oral QAM AC    lisinopril  20 mg Oral Daily    gabapentin  1,200 mg Oral QPM    lidocaine-menthol  1 patch Transdermal Daily    haloperidol lactate  1 mg Intravenous Once       Assessment & Plan:      #Acute hypoxic respiratory failure  #Cough  -Chest x-ray ordered  -DuoNebs ordered  -Incentive spirometry    #Fall  #Nondisplaced C1 posterior arch fracture  -CT C-spine reviewed and shows C1 fracture  -C-collar in place; will remain in place at all times for the next 3 months per neurosurgery recommendations  -Pain control as needed  -Neurosurgery following; plan for outpatient follow-up in 3 months with repeat CT cervical spine  -PT OT recommend [facility]    #Sundowning  -Seroquel    #History of lymphoma    #CKD    #History of atrial flutter    #Hypertension    #Hyperlipidemia  -Statin      Rupa Shah DO    Supplementary Documentation:     Quality:  DVT Mechanical Prophylaxis:   SCDs,    DVT Pharmacologic Prophylaxis   Medication    heparin (Porcine) 5000 UNIT/ML injection 5,000 Units                Code Status: Not on file  Mcclain: External urinary catheter in place  Mcclain Duration (in days):   Central line:    RADHA: 4/8/2024    Discharge is dependent on: Clinical improvement   At this point Ms. Kraus is expected to be discharge to: TBD    The 21st Century Cures Act makes medical notes like these available to patients in the interest of transparency. Please be advised this is a medical document. Medical documents are intended to carry relevant information, facts as evident, and the clinical opinion of the practitioner. The medical note is intended as peer to peer communication and may appear blunt or direct. It is written in medical language and may contain abbreviations or verbiage that are unfamiliar.             **Certification      PHYSICIAN Certification of Need for Inpatient Hospitalization - Initial Certification    Patient will require inpatient services that will reasonably be  expected to span two midnight's based on the clinical documentation in H+P.   Based on patients current state of illness, I anticipate that, after discharge, patient will require TBD.

## 2024-04-08 NOTE — PLAN OF CARE
Pt drowsy this am after receiving Seroquel 50 yesterday.  Pt more awake after working with therapy.  OOB to commode and chair with therapy. Aspen collar on aligned.  Using O2 at 1l to keep sats WNL.  Congested cough.  Encouraged IS use.  Pt verbalized understanding of POC.

## 2024-04-09 VITALS
HEIGHT: 64 IN | HEART RATE: 66 BPM | DIASTOLIC BLOOD PRESSURE: 53 MMHG | WEIGHT: 132.25 LBS | BODY MASS INDEX: 22.58 KG/M2 | OXYGEN SATURATION: 98 % | RESPIRATION RATE: 20 BRPM | TEMPERATURE: 99 F | SYSTOLIC BLOOD PRESSURE: 127 MMHG

## 2024-04-09 PROCEDURE — 99239 HOSP IP/OBS DSCHRG MGMT >30: CPT | Performed by: STUDENT IN AN ORGANIZED HEALTH CARE EDUCATION/TRAINING PROGRAM

## 2024-04-09 RX ORDER — QUETIAPINE FUMARATE 25 MG/1
25 TABLET, FILM COATED ORAL NIGHTLY
Qty: 30 TABLET | Refills: 0 | Status: SHIPPED | OUTPATIENT
Start: 2024-04-09

## 2024-04-09 NOTE — PHYSICAL THERAPY NOTE
PHYSICAL THERAPY TREATMENT NOTE - INPATIENT    Room Number: 385/385-A     Session: 2     Number of Visits to Meet Established Goals: 5    Presenting Problem: closed nondisplaced fracture of first cervical vertebra  Co-Morbidities : Aflutter, HL, macular degeneration, OA    ASSESSMENT   Patient demonstrates fair progress this session, goals  remain in progress.    Patient continues to function below baseline with transfers and gait.  Contributing factors to remaining limitations include decreased functional strength, decreased endurance/aerobic capacity, and impaired standing balance.  Next session anticipate patient to progress transfers and gait.  Physical Therapy will continue to follow patient for duration of hospitalization.    Patient continues to benefit from continued skilled PT services: to promote return to prior level of function and safety with continuous assistance and gradual rehabilitative therapy .    PLAN  PT Treatment Plan: Bed mobility;Body mechanics;Endurance;Energy conservation;Don/doff brace;Family education;Patient education;Gait training;Transfer training;Balance training  Rehab Potential : Good  Frequency (Obs): 3x/week    CURRENT GOALS     Goal #1 Patient is able to demonstrate supine - sit EOB @ level: supervision      Goal #2 Patient is able to demonstrate transfers Sit to/from Stand at assistance level: supervision      Goal #3 Patient is able to ambulate 150 feet with assist device: walker - rolling at assistance level: supervision      Goal #4     Goal #5     Goal #6     Goal Comments: Goals established on 4/6/2024 4/9/2024 all goals ongoing    SUBJECTIVE  Pt pleasant and willing to do some activity    OBJECTIVE  Precautions: Spine;Cervical brace;Bed/chair alarm (Aspen Collar)    WEIGHT BEARING RESTRICTION                   PAIN ASSESSMENT   Rating: Unable to rate  Location: L abdominals by ribs  Management Techniques: Activity promotion;Breathing  techniques;Relaxation;Repositioning    BALANCE                                                                                                                       Static Sitting: Fair -  Dynamic Sitting: Poor +           Static Standing: Poor  Dynamic Standing: Poor -    ACTIVITY TOLERANCE                         O2 WALK         AM-PAC '6-Clicks' INPATIENT SHORT FORM - BASIC MOBILITY  How much difficulty does the patient currently have...  Patient Difficulty: Turning over in bed (including adjusting bedclothes, sheets and blankets)?: A Lot   Patient Difficulty: Sitting down on and standing up from a chair with arms (e.g., wheelchair, bedside commode, etc.): A Lot   Patient Difficulty: Moving from lying on back to sitting on the side of the bed?: A Lot   How much help from another person does the patient currently need...   Help from Another: Moving to and from a bed to a chair (including a wheelchair)?: A Lot   Help from Another: Need to walk in hospital room?: A Lot   Help from Another: Climbing 3-5 steps with a railing?: Total       AM-PAC Score:  Raw Score: 11   Approx Degree of Impairment: 72.57%   Standardized Score (AM-PAC Scale): 33.86   CMS Modifier (G-Code): CL    FUNCTIONAL ABILITY STATUS  Gait Assessment   Functional Mobility/Gait Assessment  Gait Assistance: Moderate assistance  Distance (ft): 15, 30  Assistive Device: Rolling walker  Pattern: R Flexed knee;L Flexed knee    Skilled Therapy Provided  Pt presents sitting in chair with dtr in room  Cuing required for hand and feet placement when t/f sit<>stand  Mod A required during ambulation, however, progressed  to Min A with increased distance .   Pt required Min A during STS , progressed to CGA c cues for eccentric control and to reach for armrests    Bed Mobility:  Rolling: NT   Supine<>Sit: NT   Sit<>Supine: NT     Transfer Mobility:  Sit<>Stand: Mod A x1    Stand<>Sit: CGA   Gait: Mod A - 15ft in room; 30ft In andrea    Therapist's Comments: Pt left  in chair with call light with dtr present. Pt performed well this session and was pleasant and willing to do activities      THERAPEUTIC EXERCISES  Lower Extremity Alternating marching  Ankle pumps  LAQ     Upper Extremity      Position Sitting     Repetitions   10   Sets   1     Patient End of Session: Up in chair;Needs met;Call light within reach;Alarm set;Family present    PT Session Time: 30 minutes  Gait Training: 15 minutes  Therapeutic Activity: 10 minutes  Therapeutic Exercise: 5 minutes   Neuromuscular Re-education: 0 minutes

## 2024-04-09 NOTE — CM/SW NOTE
Pt medically cleared for DC today. [facility] notified of pending DC. Facility can accept pt for admission today. [facility] scheduled for 2:00 Pm .     PCS form completed and available for RN.     RN to call report to receiving RN at [facility] [place] [phone]       CM/SW will remain available for DC planning and/or support.     JUSTINA HerreraN, CMSRN    [id number]

## 2024-04-09 NOTE — PLAN OF CARE
O2 sats WNL on RA, using IS q1h.  Aspen collar on and aligned.  OOB to chair and ambulated in andrea with mod assist of one person.  Denies need for pain med.  Good appetite.  Worked with therapy.  Seen by hospitalist.  OK for dc to [facility] today.  Family at bedside and agrees with POC.

## 2024-04-09 NOTE — DISCHARGE SUMMARY
[facility]     Luana Kraus Patient Status:  Inpatient    1925 MRN [id number]   Location [facility] 3SW-A Attending No att. providers found   Hosp Day # 3 PCP No primary care provider on file.     Date of Admission: 2024  Date of Discharge:  2024     Discharge Disposition: SNF Subacute Rehab    Discharge Diagnosis:  #Acute hypoxic respiratory failure  #Cough  #Fall  #Nondisplaced C1 posterior arch fracture  #Sundowning  #Hx of lymphoma  #CKD  #Hx of Aflutter  #HTN  #HLD    History of Present Illness: Luana Kraus is a 89 year old female with weakness.     Has had worsening dizziness last few weeks with occasional falls.  Slid down piece of furniture today and hit left rib area but not head.  No LOC.  However, fell face first in February and has had neck pain since that time.   No fevers, n/v, diarrhea, cough.        Brief Synopsis: Neurosurgery was consulted and recommended no surgical intervention and outpatient follow up. C collar was placed. Patient developed cough and was placed on oxygen. CXR showed no pneumonia. Patient was weaned off oxygen. She was discharged to [facility].    Lace+ Score: 69  59-90 High Risk  29-58 Medium Risk  0-28   Low Risk       TCM Follow-Up Recommendation:  LACE > 58: High Risk of readmission after discharge from the hospital.      Procedures during hospitalization:   None    Incidental or significant findings and recommendations (brief descriptions):  See brief synopsis above     Lab/Test results pending at Discharge:   None    Consultants:  Neurosurgery     Discharge Medication List:     Discharge Medications        START taking these medications        Instructions Prescription details   lidocaine-menthol 4-1 % Ptch      Place 1 patch onto the skin daily.   Quantity: 30 patch  Refills: 0     QUEtiapine 25 MG Tabs  Commonly known as: SEROquel      Take 1 tablet (25 mg total) by mouth nightly.   Quantity: 30 tablet  Refills: 0             CONTINUE taking these medications        Instructions Prescription details   amLODIPine 5 MG Tabs  Commonly known as: Norvasc      Take 1 tablet (5 mg total) by mouth every morning.   Refills: 0     atorvastatin 10 MG Tabs  Commonly known as: Lipitor      Take 1 tablet (10 mg total) by mouth nightly.   Quantity: 90 tablet  Refills: 3     DULoxetine 20 MG Cpep  Commonly known as: Cymbalta      Take 1 capsule (20 mg total) by mouth daily.   Refills: 0     gabapentin 600 MG Tabs  Commonly known as: Neurontin      TAKE ONE TABLET BY MOUTH IN MORNING AND NOON AND 2 TABLET IN THE EVENING   Quantity: 360 tablet  Refills: 3     lisinopril 20 MG Tabs  Commonly known as: Prinivil; Zestril      Take 1 tablet (20 mg total) by mouth daily.   Refills: 0     omeprazole 20 MG Cpdr  Commonly known as: PriLOSEC      Take 1 capsule (20 mg total) by mouth daily.   Quantity: 90 capsule  Refills: 0     PreserVision AREDS Tabs      Take by mouth.   Refills: 0     TYLENOL ARTHRITIS PAIN OR      Take 500 mg by mouth as needed (patient takes 1 tablet every 8 hours as needed for pain).   Refills: 0               Where to Get Your Medications        These medications [facility] [address], [facility] [address] [phone], [phone]  [facility] [address], [facility] [address] [address]      Phone: [phone]   lidocaine-menthol 4-1 % Ptch  QUEtiapine 25 MG Tabs         [place]     Follow-up appointment:   Jose Palma PA-C  [facility] [address] [id number] [phone]    Schedule an appointment as soon as possible for a visit in 12 week(s)  For wound re-check    Appointments for Next 30 Days 4/10/2024 - 5/10/2024      None            Vital signs:  Temp:  [98.6 °F (37 °C)] 98.6 °F (37 °C)  Pulse:  [66] 66  Resp:  [20] 20  BP: (127)/(53) 127/53  SpO2:  [98 %] 98 %    Physical Exam:    General: No acute distress   Lungs: clear to auscultation  Cardiovascular: S1,  S2  Abdomen: Soft      -----------------------------------------------------------------------------------------------  PATIENT DISCHARGE INSTRUCTIONS: See electronic chart    Rupa Shah,     Total time spent on discharge plannin minutes     The  [employer] Cures Act makes medical notes like these available to patients in the interest of transparency. Please be advised this is a medical document. Medical documents are intended to carry relevant information, facts as evident, and the clinical opinion of the practitioner. The medical note is intended as peer to peer communication and may appear blunt or direct. It is written in medical language and may contain abbreviations or verbiage that are unfamiliar.

## 2024-04-09 NOTE — PLAN OF CARE
Patient is alert and oriented x 4, forgetful at times. Vitals stable on 1-2L oxygen prn. Pain managed by prn meds. Denies numbness & tingling. Aspen collar on. Voiding without difficulty, purewick in place. Tolerating regular diet. Up with ivna maki. Plan to discharge with [facility]. Plan of care discussed with patient.

## 2024-04-15 NOTE — CDS QUERY
CLINICAL DOCUMENTATION CLARIFICATION FORM  Dear Dr. JUVE Shah,  Clinical information from the medical record includes a diagnosis of acute hypoxic respiratory failure. Based on your medical judgement, the clinical indicators (included below), and the recognized standards; can you please clarify:    (   ) Acute hypoxic respiratory failure ruled out.    (   ) Acute hypoxic respiratory failure confirmed (please provide additional clinical information supporting the diagnosis in the medical record) ________________________________    (   ) Other:________________    _____________________________________________________________________________________                Documentation from the Medical Record:   Clinical Indicators:   >Per Epic Flowsheet patient came in on Room air 95% 04/05 at 1746. 04/07 0817 1L 92%. 04/07 @ 1620 2L92% , 04/07 @ 1950  1L 92%,  04/08 @ 1624 room air until discharged on 04/09  >04/06/24 Neurosurgery- presented to the emergency department for frequent falls. Patient found to have multiple left-sided rib fractures from fall yesterday as well as a minimally displaced odontoid fracture and nondisplaced fracture of the posterior arch of C1 which happened likely a few  months ago.  >04/08/24 Assessment and plan Acute hypoxic respiratory failure has cough.   >Discharge summary- diagnosis acute hypoxic respiratory failure   Patient developed cough and was placed on oxygen. CXR showed no pneumonia. Patient was weaned off oxygen.    Risk Factors Advanced age, current, Nondisplaced C1 posterior arch fracture patient has multiple left sided rib fractures from fall yesterday.  Treatment CXR, Incentive spirometry, DuoNeb    Use of terms such as suspected, possible, or probable (associated with a specific diagnosis that is being evaluated, monitored, or treated as if it exists) are acceptable and can be coded in the inpatient setting, when documented at the time of discharge.   Please add any additional  documentation to your progress note and continue to document this through discharge.  If you have any questions, please contact Clinical : Monica Murrieta RN /BSN @ [phone] or email [email or web address]  Thank You!                                                           THIS FORM IS A PERMANENT PART OF THE MEDICAL RECORD

## 2024-04-26 ENCOUNTER — HOSPITAL ENCOUNTER (OUTPATIENT)
Dept: GENERAL RADIOLOGY | Facility: HOSPITAL | Age: 89
Discharge: HOME | End: 2024-04-26
Attending: PHYSICIAN ASSISTANT
Payer: MEDICARE

## 2024-04-26 ENCOUNTER — OFFICE VISIT (OUTPATIENT)
Dept: SURGERY | Facility: CLINIC | Age: 89
End: 2024-04-26
Payer: MEDICARE

## 2024-04-26 ENCOUNTER — TELEPHONE (OUTPATIENT)
Dept: SURGERY | Facility: CLINIC | Age: 89
End: 2024-04-26

## 2024-04-26 VITALS
BODY MASS INDEX: 22.53 KG/M2 | SYSTOLIC BLOOD PRESSURE: 130 MMHG | WEIGHT: 132 LBS | HEART RATE: 60 BPM | DIASTOLIC BLOOD PRESSURE: 72 MMHG | HEIGHT: 64 IN

## 2024-04-26 DIAGNOSIS — Z09 HOSPITAL DISCHARGE FOLLOW-UP: ICD-10-CM

## 2024-04-26 DIAGNOSIS — S12.031D CLOSED NONDISPLACED FRACTURE OF POSTERIOR ARCH OF FIRST CERVICAL VERTEBRA WITH ROUTINE HEALING, SUBSEQUENT ENCOUNTER: ICD-10-CM

## 2024-04-26 DIAGNOSIS — S12.100D CLOSED ODONTOID FRACTURE WITH ROUTINE HEALING, SUBSEQUENT ENCOUNTER: ICD-10-CM

## 2024-04-26 DIAGNOSIS — S12.100D CLOSED ODONTOID FRACTURE WITH ROUTINE HEALING, SUBSEQUENT ENCOUNTER: Primary | ICD-10-CM

## 2024-04-26 PROCEDURE — 99204 OFFICE O/P NEW MOD 45 MIN: CPT | Performed by: PHYSICIAN ASSISTANT

## 2024-04-26 PROCEDURE — 72050 X-RAY EXAM NECK SPINE 4/5VWS: CPT | Performed by: PHYSICIAN ASSISTANT

## 2024-04-26 RX ORDER — LIDOCAINE PAIN RELIEF 40 MG/1000MG
1 PATCH TOPICAL DAILY
COMMUNITY
Start: 2024-04-08

## 2024-04-26 RX ORDER — LIDOCAINE 50 MG/G
PATCH TOPICAL
COMMUNITY
Start: 2024-02-14

## 2024-04-26 RX ORDER — CYCLOBENZAPRINE HCL 5 MG
5 TABLET ORAL NIGHTLY
COMMUNITY
Start: 2024-03-22

## 2024-04-26 RX ORDER — NITROFURANTOIN MACROCRYSTALS 100 MG/1
100 CAPSULE ORAL 2 TIMES DAILY
COMMUNITY
Start: 2023-11-10

## 2024-04-26 NOTE — PROGRESS NOTES
Patient: Luana Kraus  Medical Record Number: [id number]  YOB: 1925  PCP: No primary care provider on file.    Reason for visit: C2 fracture, nondisplaced fx of the posterior arch of C1, hospital follow up    HISTORY OF CHIEF COMPLAINT:    Luana Kraus is a very pleasant 89 year old female who presents today for: C2 fracture, nondisplaced fx of the posterior arch of C1, hospital follow up  The patient is doing well since her hospitalization.  She endorses neck soreness but believes this is secondary to the collar and uncomfortable pads.  She did get a second collar which has mildly improved her symptoms.  She endorses no new neurologic complaints of the upper or lower extremities.  No change to bladder/bowel function.  She has not completed a recent x-ray.  She presents today with her daughter.    Last hx: 4/6/24  History of Present Illness:  Luana Kraus is a a(n) 89 year old female with past medical history of atrial flutter, hypertension, hyperlipidemia, macular degeneration, osteoarthritis who currently lives independently in [facility], has had multiple falls over the last few months.  Yesterday patient slid off the couch struck the dresser on the way down into the left rib region.  If you weeks ago patient also stated she fell face first, striking her head and has had neck pain since that time.  Daughter is at bedside to help with history.  She reports no loss of consciousness, weakness, fevers, nausea vomiting diarrhea, cough, chest pain, shortness of breath.      Patient currently has Aspen collar in place and is not endorsing neck pain.  She denies upper or lower extremity weakness, numbness, tingling.  She has had no changes in her bowel or bladder.    Past Medical History:    Atypical atrial flutter (HCC)    Bilateral carotid artery disease (HCC)    Cancer (HCC)    Cataract    Essential hypertension    HLD (hyperlipidemia)    Macular degeneration    Osteoarthritis       Past Surgical History:   Procedure Laterality Date          x3    Knee replacement surgery  2008    left      Family History   Problem Relation Age of Onset    Arthritis Mother       Social History     Socioeconomic History    Marital status:    Tobacco Use    Smoking status: Never    Smokeless tobacco: Never   Vaping Use    Vaping status: Never Used   Substance and Sexual Activity    Alcohol use: No     Alcohol/week: 0.0 standard drinks of alcohol    Drug use: No   Other Topics Concern    Caffeine Concern Yes     Comment: 2 a day    Exercise Yes     Comment: PT       Allergies   Allergen Reactions    Zolpidem RESTLESSNESS and JITTERY      Current Medications:  Current Outpatient Medications   Medication Sig Dispense Refill    cyclobenzaprine 5 MG Oral Tab Take 1 tablet (5 mg total) by mouth nightly. TAKE AT BEDTIME      lidocaine 5 % External Patch APPLY 1 PATCH 1 PATCH ONCE A DAY  12 HOURS ON AND 12 HOURS OFF      LIDOCAINE PAIN RELIEF 4 % External Patch Place 1 patch onto the skin daily. REMOVE AFTER 12 HOURS      nitrofurantoin macrocrystal 100 MG Oral Cap Take 1 capsule (100 mg total) by mouth 2 (two) times daily.      QUEtiapine 25 MG Oral Tab Take 1 tablet (25 mg total) by mouth nightly. 30 tablet 0    DULoxetine 20 MG Oral Cap DR Particles Take 1 capsule (20 mg total) by mouth daily.      amLODIPine 5 MG Oral Tab Take 1 tablet (5 mg total) by mouth every morning.      lisinopril 20 MG Oral Tab Take 1 tablet (20 mg total) by mouth daily.      omeprazole 20 MG Oral Capsule Delayed Release Take 1 capsule (20 mg total) by mouth daily. 90 capsule 0    atorvastatin 10 MG Oral Tab Take 1 tablet (10 mg total) by mouth nightly. 90 tablet 3    GABAPENTIN 600 MG Oral Tab TAKE ONE TABLET BY MOUTH IN MORNING AND NOON AND 2 TABLET IN THE EVENING 360 tablet 3    Acetaminophen (TYLENOL ARTHRITIS PAIN OR) Take 500 mg by mouth as needed (patient takes 1 tablet every 8 hours as needed for pain).       Multiple Vitamins-Minerals (PRESERVISION AREDS) Oral Tab Take by mouth.          REVIEW OF SYSTEMS   Comprehensive review of systems done. Negative except what is outlined in the above HPI.     PHYSICAL EXAMIMATION    height is 64\" and weight is 132 lb (59.9 kg). Her blood pressure is 130/72 and her pulse is 60.   GENERAL: Very pleasant patient is in no apparent distress. Sitting comfortably in the wheelchair. Aspen collar in place, which is not fully limiting her rotation. Does not appear to fit properly.   HEENT: Normocephalic, atraumatic.  RESPIRATORY RATE: Easy and Even   SKIN: Warm and dry  NEURO: Awake, alert and orientated. Speech fluent, comprehension intact, answering questions appropriately.     SPINE:  Gait/Coordination: Gait deferred  Sensation: Sensory deficits noted on bilateral upper and lower extremities to light touch: Decreased of BLE which is baseline she endorses, given neuropathy   Upper Extremity Strength:     Deltoid  Biceps  Triceps    Intrinsics      Right 5 5 5 5 4+     Left 5 5 5 5 5   Lower Extremity Strength:     Iliopsoas  Hamstrings   Quads    D-flexion P-flexion   Right       5         5       5         5 5   Left       5         5       5         5 5   Tests:   Test Right   (POS or NEG) Left   (POS or NEG)   Ontiveros's Sign Neg Neg   Clonus Neg Neg     DATA:   None    IMAGING:     Study Result    Narrative   PROCEDURE:  CT SPINE CERVICAL (CPT=72125)     COMPARISON:  None.     INDICATIONS:  fall pain     TECHNIQUE:  Noncontrast CT scanning of the cervical spine is performed from the skull base through C7.  Multiplanar reconstructions are generated.  Dose reduction techniques were used. Dose information is transmitted to the ACR (American College of  Radiology) NRDR (National Radiology Data Registry) which includes the Dose Index Registry.     PATIENT STATED HISTORY: (As transcribed by Technologist)  Dizziness, fell.         FINDINGS:    There is minimally displaced fracture of the  odontoid.  There is also nondisplaced fracture of the posterior arch of C1.  The cervical vertebral alignment demonstrates no subluxation. Ligamentous injury cannot be excluded on CT scan. There are normal  cervical basilar relationships. The odontoid process is intact. No destructive osseous lesion is identified. The pre vertebral and tracy vertebral soft tissues are normal.  Multilevel degenerative changes with uncovertebral and facet hypertrophy.  There  is mild spinal canal stenosis C2-C3.  Mild spinal canal stenosis at C6.  Thyromegaly with large nodule measuring approximately 6.6 x 5.0 cm.                     Impression   CONCLUSION:    1. Minimally displaced odontoid fracture.  2. Nondisplaced fracture of the posterior arch of C1.    3. Multilevel degenerative changes of the cervical spine with uncovertebral and facet hypertrophy.  4. Thyromegaly with a left left thyroid lobe nodule measuring approximately 6.6 x 5.0 cm.  Consider ultrasound to further evaluate  5. This critical value result was called to Dr. Cid on 4/5/2024 at 8:25 p.m..  Results read back was performed.                 LOCATION:  [facility]        Dictated by (CST): Jaleesa Barrow MD on 4/05/2024 at 8:23 PM      Finalized by (CST): Jaleesa Barrow MD on 4/05/2024 at 8:26 PM     MEDICAL DECISION MAKING:     ASSESSMENT and PLAN:    ICD-10-CM    1. Closed odontoid fracture with routine healing, subsequent encounter  S12.100D       2. Closed nondisplaced fracture of posterior arch of first cervical vertebra with routine healing, subsequent encounter  S12.031D       3. Hospital discharge follow-up  Z09         PLAN:   1. Medication: None prescribed  2. Imaging:    - Reviewed today:    -CT cervical spine from hospitalization:     -Agree with radiology, C2 fracture as well as C1 posterior arch fracture noted.   - Ordered today:    - XR cervical spine, which will be her 1 month interval scan:     -Patient will attempt to complete today.  Advised  to call when complete.  I will review.  If stable, will plan for x-ray imaging in 4 to 5 weeks with follow-up.  This would be her 2-month interval scan.  3. Activity:    -New cervical collar ordered, Willa BARONE with extra pads, given her current collar is not properly fitting and limiting rotation.  Shower collar ordered as well.   -Continue with cervical collar use  4. Follow up to be determined after x-ray imaging review or call or follow up sooner or go to the ED for any new, worsening or concerning signs or symptoms     I reviewed imaging. I discussed the plan and reviewed imaging with the patient. The patient agrees with the plan, verbalized understanding and is appreciative. All questions were sought out and thoroughly answered to satisfaction.       Total visit time: 40 minutes  More than 50% spent coordinating care, providing patient education, reviewing imaging, discussing further imaging, discussing  and collar, discussing activity and counseling.    Nano Herrera M.S., [place] [address]  [phone]  4/26/2024 9:48 AM    Dragon speech recognition software was used to prepare this note. If a word or phrase is confusing, it is likely due to a failure of recognition. Please contact me with any questions or clarifications.    Is this a shared or split note between Advanced Practice Provider and Physician? Yes

## 2024-04-26 NOTE — PATIENT INSTRUCTIONS
Refill policies:    Allow 2-3 business days for refills; controlled substances may take longer.  Contact your pharmacy at least 5 days prior to running out of medication and have them send an electronic request or submit request through the “request refill” option in your [employer] account.  Refills are not addressed on weekends; covering physicians do not authorize routine medications on weekends.  No narcotics or controlled substances are refilled after noon on Fridays or by on call physicians.  By law, narcotics must be electronically prescribed.  A 30 day supply with no refills is the maximum allowed.  If your prescription is due for a refill, you may be due for a follow up appointment.  To best provide you care, patients receiving routine medications need to be seen at least once a year.  Patients receiving narcotic/controlled substance medications need to be seen at least once every 3 months.  In the event that your preferred pharmacy does not have the requested medication in stock (e.g. Backordered), it is your responsibility to find another pharmacy that has the requested medication available.  We will gladly send a new prescription to that pharmacy at your request.    Scheduling Tests:    If your physician has ordered radiology tests such as MRI or CT scans, please contact Central Scheduling at [phone] right away to schedule the test.  Once scheduled, the [facility] Centralized Referral Team will work with your insurance carrier to obtain pre-certification or prior authorization.  Depending on your insurance carrier, approval may take 3-10 days.  It is highly recommended patients assure they have received an authorization before having a test performed.  If test is done without insurance authorization, patient may be responsible for the entire amount billed.      Precertification and Prior Authorizations:  If your physician has recommended that you have a procedure or additional testing performed the [facility]  Centralized Referral Team will contact your insurance carrier to obtain pre-certification or prior authorization.    You are strongly encouraged to contact your insurance carrier to verify that your procedure/test has been approved and is a COVERED benefit.  Although the [facility] Centralized Referral Team does its due diligence, the insurance carrier gives the disclaimer that \"Although the procedure is authorized, this does not guarantee payment.\"    Ultimately the patient is responsible for payment.   Thank you for your understanding in this matter.  Paperwork Completion:  If you require FMLA or disability paperwork for your recovery, please make sure to either drop it off or have it faxed to our office at [phone]. Be sure the form has your name and date of birth on it.  The form will be faxed to our Forms Department and they will complete it for you.  There is a 25$ fee for all forms that need to be filled out.  Please be aware there is a 10-14 day turnaround time.  You will need to sign a release of information (TANVI) form if your paperwork does not come with one.  You may call the Forms Department with any questions at [phone].  Their fax number is [phone].

## 2024-04-26 NOTE — PROGRESS NOTES
States she was having issues with her brace. Stated she has gotten a new one.     States she pain in the back of her neck and rates it 3/10

## 2024-04-26 NOTE — TELEPHONE ENCOUNTER
Daughter called. Left VM per HIPAA.     XR alignment appears stable. Plan for repeat XR imaging prior to follow up in 4-5 weeks

## 2024-04-26 NOTE — TELEPHONE ENCOUNTER
Xray cervical spine completed today. Please review and advise of next steps. Patient seen today.

## 2024-04-26 NOTE — TELEPHONE ENCOUNTER
Pt's daughter called to advise pt was able to the the XR done. Pt's daughter would like to be called and informed of next steps, pt's daughter states pt is hard of hearing. Pt's daughter Kaylin can be reached at [phone]

## 2024-05-07 ENCOUNTER — TELEPHONE (OUTPATIENT)
Dept: SURGERY | Facility: CLINIC | Age: 89
End: 2024-05-07

## 2024-05-08 NOTE — TELEPHONE ENCOUNTER
Standard written order for cervical collar from [facility] received by [facility] clinical staff, endorsed to provider for review.     Placed on Jackie's desk for review and signature.

## 2024-05-10 NOTE — TELEPHONE ENCOUNTER
[facility] DME Order has been placed    Please fax the following information to     DME Order  Face sheet  Insurance card copy  Last office visit note

## 2024-05-31 ENCOUNTER — OFFICE VISIT (OUTPATIENT)
Dept: SURGERY | Facility: CLINIC | Age: 89
End: 2024-05-31

## 2024-05-31 ENCOUNTER — HOSPITAL ENCOUNTER (OUTPATIENT)
Dept: GENERAL RADIOLOGY | Facility: HOSPITAL | Age: 89
Discharge: HOME | End: 2024-05-31
Attending: PHYSICIAN ASSISTANT
Payer: MEDICARE

## 2024-05-31 VITALS — DIASTOLIC BLOOD PRESSURE: 82 MMHG | SYSTOLIC BLOOD PRESSURE: 126 MMHG

## 2024-05-31 DIAGNOSIS — S12.100D CLOSED ODONTOID FRACTURE WITH ROUTINE HEALING, SUBSEQUENT ENCOUNTER: ICD-10-CM

## 2024-05-31 DIAGNOSIS — S12.100D CLOSED ODONTOID FRACTURE WITH ROUTINE HEALING, SUBSEQUENT ENCOUNTER: Primary | ICD-10-CM

## 2024-05-31 DIAGNOSIS — Z09 HOSPITAL DISCHARGE FOLLOW-UP: ICD-10-CM

## 2024-05-31 DIAGNOSIS — S12.031D CLOSED NONDISPLACED FRACTURE OF POSTERIOR ARCH OF FIRST CERVICAL VERTEBRA WITH ROUTINE HEALING, SUBSEQUENT ENCOUNTER: ICD-10-CM

## 2024-05-31 PROCEDURE — 99213 OFFICE O/P EST LOW 20 MIN: CPT | Performed by: PHYSICIAN ASSISTANT

## 2024-05-31 PROCEDURE — 72050 X-RAY EXAM NECK SPINE 4/5VWS: CPT | Performed by: PHYSICIAN ASSISTANT

## 2024-05-31 NOTE — PATIENT INSTRUCTIONS
Refill policies:    Allow 2-3 business days for refills; controlled substances may take longer.  Contact your pharmacy at least 5 days prior to running out of medication and have them send an electronic request or submit request through the “request refill” option in your [employer] account.  Refills are not addressed on weekends; covering physicians do not authorize routine medications on weekends.  No narcotics or controlled substances are refilled after noon on Fridays or by on call physicians.  By law, narcotics must be electronically prescribed.  A 30 day supply with no refills is the maximum allowed.  If your prescription is due for a refill, you may be due for a follow up appointment.  To best provide you care, patients receiving routine medications need to be seen at least once a year.  Patients receiving narcotic/controlled substance medications need to be seen at least once every 3 months.  In the event that your preferred pharmacy does not have the requested medication in stock (e.g. Backordered), it is your responsibility to find another pharmacy that has the requested medication available.  We will gladly send a new prescription to that pharmacy at your request.    Scheduling Tests:    If your physician has ordered radiology tests such as MRI or CT scans, please contact Central Scheduling at [phone] right away to schedule the test.  Once scheduled, the Count [facility] the [facility] Centralized Referral Team will work with your insurance carrier to obtain pre-certification or prior authorization.  Depending on your insurance carrier, approval may take 3-10 days.  It is highly recommended patients assure they have received an authorization before having a test performed.  If test is done without insurance authorization, patient may be responsible for the entire amount billed.      Precertification and Prior Authorizations:  If your physician has recommended that you have a procedure or additional testing performed the Count includes the [facility]  Centralized Referral Team will contact your insurance carrier to obtain pre-certification or prior authorization.    You are strongly encouraged to contact your insurance carrier to verify that your procedure/test has been approved and is a COVERED benefit.  Although the [facility] Centralized Referral Team does its due diligence, the insurance carrier gives the disclaimer that \"Although the procedure is authorized, this does not guarantee payment.\"    Ultimately the patient is responsible for payment.   Thank you for your understanding in this matter.  Paperwork Completion:  If you require FMLA or disability paperwork for your recovery, please make sure to either drop it off or have it faxed to our office at [phone]. Be sure the form has your name and date of birth on it.  The form will be faxed to our Forms Department and they will complete it for you.  There is a 25$ fee for all forms that need to be filled out.  Please be aware there is a 10-14 day turnaround time.  You will need to sign a release of information (TANVI) form if your paperwork does not come with one.  You may call the Forms Department with any questions at [phone].  Their fax number is [phone].

## 2024-05-31 NOTE — PROGRESS NOTES
Patient: Luana Kraus  Medical Record Number: [id number]  YOB: 1925  PCP: None Pcp    Reason for visit: C2 fracture, nondisplaced fx of the posterior arch of C1     HISTORY OF CHIEF COMPLAINT:    Luana Kraus is a very pleasant 89 year old female who presents today for: C2 fracture, nondisplaced fx of the posterior arch of C1   The patient is doing well.  No new neurologic complaints.  She is tolerating the collar.    Last hx:4/26/24  Luana Kraus is a very pleasant 89 year old female who presents today for: C2 fracture, nondisplaced fx of the posterior arch of C1, hospital follow up  The patient is doing well since her hospitalization.  She endorses neck soreness but believes this is secondary to the collar and uncomfortable pads.  She did get a second collar which has mildly improved her symptoms.  She endorses no new neurologic complaints of the upper or lower extremities.  No change to bladder/bowel function.  She has not completed a recent x-ray.  She presents today with her daughter.     Last hx: 4/6/24  History of Present Illness:  Luana Kraus is a a(n) 89 year old female with past medical history of atrial flutter, hypertension, hyperlipidemia, macular degeneration, osteoarthritis who currently lives independently in [facility], has had multiple falls over the last few months.  Yesterday patient slid off the couch struck the dresser on the way down into the left rib region.  If you weeks ago patient also stated she fell face first, striking her head and has had neck pain since that time.  Daughter is at bedside to help with history.  She reports no loss of consciousness, weakness, fevers, nausea vomiting diarrhea, cough, chest pain, shortness of breath.      Patient currently has Aspen collar in place and is not endorsing neck pain.  She denies upper or lower extremity weakness, numbness, tingling.  She has had no changes in her bowel or bladder.    Past  Medical History:    Atypical atrial flutter (HCC)    Bilateral carotid artery disease (HCC)    Cancer (HCC)    Cataract    Essential hypertension    HLD (hyperlipidemia)    Macular degeneration    Osteoarthritis      Past Surgical History:   Procedure Laterality Date          x3    Knee replacement surgery  2008    left      Family History   Problem Relation Age of Onset    Arthritis Mother       Social History     Socioeconomic History    Marital status:    Tobacco Use    Smoking status: Never    Smokeless tobacco: Never   Vaping Use    Vaping status: Never Used   Substance and Sexual Activity    Alcohol use: No     Alcohol/week: 0.0 standard drinks of alcohol    Drug use: No   Other Topics Concern    Caffeine Concern Yes     Comment: 2 a day    Exercise Yes     Comment: PT       Allergies   Allergen Reactions    Zolpidem RESTLESSNESS and JITTERY      Current Medications:  Current Outpatient Medications   Medication Sig Dispense Refill    cyclobenzaprine 5 MG Oral Tab Take 1 tablet (5 mg total) by mouth nightly. TAKE AT BEDTIME      lidocaine 5 % External Patch APPLY 1 PATCH 1 PATCH ONCE A DAY  12 HOURS ON AND 12 HOURS OFF      LIDOCAINE PAIN RELIEF 4 % External Patch Place 1 patch onto the skin daily. REMOVE AFTER 12 HOURS      DULoxetine 20 MG Oral Cap DR Particles Take 1 capsule (20 mg total) by mouth daily.      amLODIPine 5 MG Oral Tab Take 1 tablet (5 mg total) by mouth every morning.      lisinopril 20 MG Oral Tab Take 1 tablet (20 mg total) by mouth daily.      atorvastatin 10 MG Oral Tab Take 1 tablet (10 mg total) by mouth nightly. 90 tablet 3    GABAPENTIN 600 MG Oral Tab TAKE ONE TABLET BY MOUTH IN MORNING AND NOON AND 2 TABLET IN THE EVENING 360 tablet 3    Acetaminophen (TYLENOL ARTHRITIS PAIN OR) Take 500 mg by mouth as needed (patient takes 1 tablet every 8 hours as needed for pain).      Multiple Vitamins-Minerals (PRESERVISION AREDS) Oral Tab Take by mouth.          REVIEW OF  SYSTEMS   Comprehensive review of systems done. Negative except what is outlined in the above HPI.     PHYSICAL EXAMIMATION    blood pressure is 126/82.   GENERAL: Very pleasant patient is in no apparent distress. Sitting comfortably in the examination chair. [place] J collar in place  HEENT: Normocephalic, atraumatic.  RESPIRATORY RATE: Easy and Even   SKIN: Warm and dry  NEURO: Awake, alert and orientated. Speech fluent, comprehension intact, answering questions appropriately.     SPINE:  Gait/Coordination: Gait deferred    Upper Extremity Strength:     Deltoid  Biceps  Triceps    Intrinsics      Right 5 5 5 5 5     Left 5 5 5 5 5   Lower Extremity Strength:     Iliopsoas  Hamstrings   Quads    D-flexion P-flexion   Right       5         5       5         5 5   Left       5         5       5         5 5     Tests:   Test Right   (POS or NEG) Left   (POS or NEG)   Ontiveros's Sign Neg Neg   Clonus Neg Neg   Bilateral lower and upper extremity sensation intact, proximally and distally    DATA:   None    IMAGING:     Study Result    Narrative   PROCEDURE:  XR CERVICAL SPINE (4VIEWS) (CPT=72050)     TECHNIQUE:  AP, lateral, obliques, and coned down view of the spine were obtained.     COMPARISON:  [facility] , XR, XR CERVICAL SPINE (4VIEWS) (CPT=72050), 4/26/2024, 10:59 AM.     INDICATIONS:  Z09 Hospital discharge follow-up S12.031D Closed nondisplaced fracture of posterior arch of first cervical vertebra with routine healing, subsequent encounter *     PATIENT STATED HISTORY: (As transcribed by Technologist)  Patient stated she broke her neck in February.                           Impression   CONCLUSION:       Known fractures of the odontoid process and posterior arch of C1, as seen on CT of 04/05/2024 are not well visualized radiographically.     Cervical vertebral bodies maintain normal height with grade 1 anterolisthesis of C4 on C5 and C5 on C6.  Moderate multilevel degenerative disc disease and severe bilateral  facet and uncovertebral hypertrophy at multiple levels results in high-grade  multilevel foraminal stenosis.  Lateral masses are aligned.  No prevertebral edema.        LOCATION:  [facility]        Dictated by (CST): Virginia Jones MD on 5/31/2024 at 11:41 AM      Finalized by (CST): Virginia Jones MD on 5/31/2024 at 11:43 AM         MEDICAL DECISION MAKING:     ASSESSMENT and PLAN:  1. Closed odontoid fracture with routine healing, subsequent encounter      PLAN:   1. Medication: None prescribed  2. Imaging:    - Reviewed today:    - XR cervical spine:     - Agree with radiology, alignment appears stable. Will continue to closely monitor    - Ordered today:    - CT cervical spine:     - Monitor C1, C2 fx. This will be her 3 month follow up  3. Activity:    - Continue cervical collar use as instructed   4. Follow up in 4 weeks or call or follow up sooner or go to the ED for any new, worsening or concerning signs or symptoms     I reviewed imaging. I discussed the plan and reviewed imaging with the patient. The patient agrees with the plan, verbalized understanding and is appreciative. All questions were sought out and thoroughly answered to satisfaction.       Total visit time: 30 min  More than 50% spent coordinating care, providing patient education, reviewing imaging, discussing further imaging, discussing collar use and counseling.    Nano Herrera M.S., PA-C  [address] [address]  [phone]  5/31/2024 12:38 PM    Dragon speech recognition software was used to prepare this note. If a word or phrase is confusing, it is likely due to a failure of recognition. Please contact me with any questions or clarifications.

## 2024-07-03 ENCOUNTER — HOSPITAL ENCOUNTER (OUTPATIENT)
Dept: CT IMAGING | Facility: HOSPITAL | Age: 89
Discharge: HOME | End: 2024-07-03
Attending: PHYSICIAN ASSISTANT
Payer: MEDICARE

## 2024-07-03 DIAGNOSIS — S12.100D CLOSED ODONTOID FRACTURE WITH ROUTINE HEALING, SUBSEQUENT ENCOUNTER: ICD-10-CM

## 2024-07-03 PROCEDURE — 72125 CT NECK SPINE W/O DYE: CPT | Performed by: PHYSICIAN ASSISTANT

## 2024-07-16 ENCOUNTER — OFFICE VISIT (OUTPATIENT)
Dept: SURGERY | Facility: CLINIC | Age: 89
End: 2024-07-16
Payer: MEDICARE

## 2024-07-16 VITALS
SYSTOLIC BLOOD PRESSURE: 124 MMHG | BODY MASS INDEX: 27.49 KG/M2 | HEIGHT: 64 IN | WEIGHT: 161 LBS | HEART RATE: 61 BPM | DIASTOLIC BLOOD PRESSURE: 86 MMHG

## 2024-07-16 DIAGNOSIS — S12.031D CLOSED NONDISPLACED FRACTURE OF POSTERIOR ARCH OF FIRST CERVICAL VERTEBRA WITH ROUTINE HEALING, SUBSEQUENT ENCOUNTER: ICD-10-CM

## 2024-07-16 DIAGNOSIS — S12.100D CLOSED ODONTOID FRACTURE WITH ROUTINE HEALING, SUBSEQUENT ENCOUNTER: Primary | ICD-10-CM

## 2024-07-16 PROCEDURE — 99213 OFFICE O/P EST LOW 20 MIN: CPT | Performed by: NEUROLOGICAL SURGERY

## 2024-07-16 NOTE — PROGRESS NOTES
Neurosurgery staff    Patient seen and examined.  Please also see NATASHA Taylor Devaughn' note for further information.    The patient presents today in follow-up.  She was initially evaluated by Dr. Torres, for C1-2 fractures, in early April.  She has been treated with a [place] J collar.    No complaints at this point.    She is neurologically intact on examination.  The collar was removed, and clinical clearance was performed.  No significant pain.  No neurological symptoms.    CT scan and x-rays personally reviewed.  These demonstrate good sagittal and coronal alignment.  No significant displacement of fracture fragments.    The patient is cleared to remove the cervical collar.  Detailed instructions were provided.    She should also start physical therapy, to focus on neck muscle strengthening and range of motion.    She will follow-up with Claudia in 6 weeks, with new upright x-rays.

## 2024-07-16 NOTE — PROGRESS NOTES
Patient: Luana Kraus  Medical Record Number: [id number]  YOB: 1925  PCP: None Pcp    Reason for visit: Cervical follow up    HISTORY OF CHIEF COMPLAINT:    Luana Kraus is a very pleasant 89 year old female who presents today for: C2 fracture, nondisplaced fracture of the posterior arch of C1.   Patient states she is doing well.  She denies any neck pain, radiating arm pain, or numbness or tingling in the upper extremities.  She is tolerating the collar but is eager to have it removed.   Patient was previously seen by Nano Herrera PA-C.  Last history per Nano:   Last hx:4/26/24  Luana Kraus is a very pleasant 89 year old female who presents today for: C2 fracture, nondisplaced fx of the posterior arch of C1, hospital follow up  The patient is doing well since her hospitalization.  She endorses neck soreness but believes this is secondary to the collar and uncomfortable pads.  She did get a second collar which has mildly improved her symptoms.  She endorses no new neurologic complaints of the upper or lower extremities.  No change to bladder/bowel function.  She has not completed a recent x-ray.  She presents today with her daughter.    Last hx: 4/6/24  History of Present Illness:  Luana Kraus is a a(n) 89 year old female with past medical history of atrial flutter, hypertension, hyperlipidemia, macular degeneration, osteoarthritis who currently lives independently in [facility], has had multiple falls over the last few months.  Yesterday patient slid off the couch struck the dresser on the way down into the left rib region.  If you weeks ago patient also stated she fell face first, striking her head and has had neck pain since that time.  Daughter is at bedside to help with history.  She reports no loss of consciousness, weakness, fevers, nausea vomiting diarrhea, cough, chest pain, shortness of breath.      Patient currently has Aspen collar in place and is  not endorsing neck pain.  She denies upper or lower extremity weakness, numbness, tingling.  She has had no changes in her bowel or bladder.    Past Medical History:    Atypical atrial flutter (HCC)    Bilateral carotid artery disease (HCC)    Cancer (HCC)    Cataract    Essential hypertension    HLD (hyperlipidemia)    Macular degeneration    Osteoarthritis      Past Surgical History:   Procedure Laterality Date          x3    Knee replacement surgery  2008    left      Family History   Problem Relation Age of Onset    Arthritis Mother       Social History     Socioeconomic History    Marital status:    Tobacco Use    Smoking status: Never    Smokeless tobacco: Never   Vaping Use    Vaping status: Never Used   Substance and Sexual Activity    Alcohol use: No     Alcohol/week: 0.0 standard drinks of alcohol    Drug use: No   Other Topics Concern    Caffeine Concern Yes     Comment: 2 a day    Exercise Yes     Comment: PT       Allergies   Allergen Reactions    Zolpidem RESTLESSNESS and JITTERY      Current Medications:  Current Outpatient Medications   Medication Sig Dispense Refill    cyclobenzaprine 5 MG Oral Tab Take 1 tablet (5 mg total) by mouth nightly. TAKE AT BEDTIME      lidocaine 5 % External Patch APPLY 1 PATCH 1 PATCH ONCE A DAY  12 HOURS ON AND 12 HOURS OFF      LIDOCAINE PAIN RELIEF 4 % External Patch Place 1 patch onto the skin daily. REMOVE AFTER 12 HOURS      DULoxetine 20 MG Oral Cap DR Particles Take 1 capsule (20 mg total) by mouth daily.      amLODIPine 5 MG Oral Tab Take 1 tablet (5 mg total) by mouth every morning.      lisinopril 20 MG Oral Tab Take 1 tablet (20 mg total) by mouth daily.      atorvastatin 10 MG Oral Tab Take 1 tablet (10 mg total) by mouth nightly. 90 tablet 3    GABAPENTIN 600 MG Oral Tab TAKE ONE TABLET BY MOUTH IN MORNING AND NOON AND 2 TABLET IN THE EVENING 360 tablet 3    Acetaminophen (TYLENOL ARTHRITIS PAIN OR) Take 500 mg by mouth as needed (patient  takes 1 tablet every 8 hours as needed for pain).      Multiple Vitamins-Minerals (PRESERVISION AREDS) Oral Tab Take by mouth.          REVIEW OF SYSTEMS   Comprehensive review of systems done. Negative except what is outlined in the above HPI.     PHYSICAL EXAMIMATION    height is 64\" and weight is 161 lb (73 kg). Her blood pressure is 124/86 and her pulse is 61.   GENERAL: Very pleasant patient is in no apparent distress. Sitting comfortably in wheelchair.  HEENT: Normocephalic, atraumatic.  RESPIRATORY RATE: Easy and Even   SKIN: Warm and dry  NEURO: Awake, alert and orientated. Speech fluent, comprehension intact, answering questions appropriately.   SPINE:  Gait/Coordination: Gait deferred   Sensation: Sensory deficits noted on bilateral upper extremities to light touch: None   Cervical/Lumbar Integument:   ROM:  Cervical Flexion  Pain free   Cervical Extension Pain free   Cervical Rotation  Pain free  Palpation: Non tender to palpation of midline cervical spine     Upper Extremity Strength:     Deltoid  Biceps  Triceps    Intrinsics      Right 5 5 5 5 5     Left 5 5 5 5 5   Lower Extremity Strength:     Iliopsoas  Hamstrings   Quads    D-flexion P-flexion   Right       5         5       5         5 5   Left       5         5       5         5 5     DATA:   None    IMAGING:   CT cervical spine from 07/03/2024  Narrative   PROCEDURE:  CT SPINE CERVICAL (CPT=72125)     COMPARISON:  [facility], CT SPINE CERVICAL (CPT=72125), 4/05/2024, 7:58 PM.     INDICATIONS:  S12.100D Closed odontoid fracture with routine healing, subsequent encounter     TECHNIQUE:  Noncontrast CT scanning of the cervical spine is performed from the skull base through C7.  Multiplanar reconstructions are generated.  Dose reduction techniques were used. Dose information is transmitted to the ACR (American College of  Radiology) NRDR (National Radiology Data Registry) which includes the Dose Index Registry.     PATIENT STATED HISTORY: (As  transcribed by Technologist)  Patient fell and had cervical fracture.  Eval healing.         FINDINGS:       Oblique fracture through the odontoid redemonstrated, fracture line continues to be visible although slightly less so than on the prior most likely reflecting interval healing, the fracture line is seen for example sagittal reconstruction series 6 image  80-91.  Minimal stable displacement.  Fractures of the posterior arch of C1 redemonstrated with minimal displacement, the fracture lines continue to be visible although less so, indicating likely interval healing.  No new fracture identified.  No  developing new deformity.     Multilevel degenerative disc and facet change redemonstrated as demonstrated on the prior CT largely unchanged.  Heterogeneous thyroid enlargement redemonstrated.  Imaging of the apex of the lung shows no acute process.                   Impression   CONCLUSION:  Healing fractures of C1 and C2, minimally displaced, with no new fracture, developing deformity or other acute process.  Extensive chronic abnormalities otherwise are stable.           LOCATION:  [id number]        Dictated by (CST): Jason Cotter MD on 7/03/2024 at 10:22 AM      Finalized by (CST): Jason Cotter MD on 7/03/2024 at 10:29 AM       XR cervical spine from 05/31/2024  Narrative   PROCEDURE:  XR CERVICAL SPINE (4VIEWS) (CPT=72050)     TECHNIQUE:  AP, lateral, obliques, and coned down view of the spine were obtained.     COMPARISON:  [facility] , XR, XR CERVICAL SPINE (4VIEWS) (CPT=72050), 4/26/2024, 10:59 AM.     INDICATIONS:  Z09 Hospital discharge follow-up S12.031D Closed nondisplaced fracture of posterior arch of first cervical vertebra with routine healing, subsequent encounter *     PATIENT STATED HISTORY: (As transcribed by Technologist)  Patient stated she broke her neck in February.                           Impression   CONCLUSION:       Known fractures of the odontoid process and posterior arch of C1, as seen  on CT of 04/05/2024 are not well visualized radiographically.     Cervical vertebral bodies maintain normal height with grade 1 anterolisthesis of C4 on C5 and C5 on C6.  Moderate multilevel degenerative disc disease and severe bilateral facet and uncovertebral hypertrophy at multiple levels results in high-grade  multilevel foraminal stenosis.  Lateral masses are aligned.  No prevertebral edema.        LOCATION:  [facility]        Dictated by (CST): Virginia Jones MD on 5/31/2024 at 11:41 AM      Finalized by (CST): Virginia Jones MD on 5/31/2024 at 11:43 AM       MEDICAL DECISION MAKING:     ASSESSMENT and PLAN:  1. Closed odontoid fracture with routine healing, subsequent encounter    2. Closed nondisplaced fracture of posterior arch of first cervical vertebra with routine healing, subsequent encounter        PLAN:   1. Medication: None prescribed   2. Imaging:    - Reviewed today:    - CT cervical spine from 07/03/2024     -Agree with radiology.  Healing C2 and C1 fractures with good sagittal and coronal alignment without displacement of fracture fragments     -XR cervical spine from 05/31/2024   -Healing C2 and C1 fractures with good sagittal and coronal alignment without displacement of fracture fragments   - Ordered today:  - XR Cervical spine to be completed prior to follow up visit in 6 weeks     3. Cervical collar: May gradually discontinue collar, advised to wear when OOB/in large group settings, otherwise do not need to wear while sleeping or at home while sedentary  4. Activity: PT referral given, emphasized focus on active cervical range of motion and neck muscle strengthening. Advised patient may return to activities such as sewing gradually as tolerated.   5. Follow up in 6 weeks or call or follow up sooner or go to the ED for any new, worsening or concerning signs or symptoms           Total visit time:   More than 50% spent coordinating care, providing patient education, reviewing imaging and  counseling.    Elsie Cantor M.S., PAMelvaC  [address], [address]  [place], [place] [id number] [phone]  7/16/2024 3:59 PM

## 2024-07-16 NOTE — PROGRESS NOTES
Established patient:  Reason for follow up:   Discuss CT results    Numeric Rating Scale:        Pain at Present:  0/10       New imaging or testing since your last office visit:    CT spine cervical DOS 07/03/24

## 2024-07-16 NOTE — PATIENT INSTRUCTIONS
Refill policies:    Allow 2-3 business days for refills; controlled substances may take longer.  Contact your pharmacy at least 5 days prior to running out of medication and have them send an electronic request or submit request through the “request refill” option in your [facility] account.  Refills are not addressed on weekends; covering physicians do not authorize routine medications on weekends.  No narcotics or controlled substances are refilled after noon on Fridays or by on call physicians.  By law, narcotics must be electronically prescribed.  A 30 day supply with no refills is the maximum allowed.  If your prescription is due for a refill, you may be due for a follow up appointment.  To best provide you care, patients receiving routine medications need to be seen at least once a year.  Patients receiving narcotic/controlled substance medications need to be seen at least once every 3 months.  In the event that your preferred pharmacy does not have the requested medication in stock (e.g. Backordered), it is your responsibility to find another pharmacy that has the requested medication available.  We will gladly send a new prescription to that pharmacy at your request.    Scheduling Tests:    If your physician has ordered radiology tests such as MRI or CT scans, please contact Central Scheduling at [phone] right away to schedule the test.  Once scheduled, the [facility] Centralized Referral Team will work with your insurance carrier to obtain pre-certification or prior authorization.  Depending on your insurance carrier, approval may take 3-10 days.  It is highly recommended patients assure they have received an authorization before having a test performed.  If test is done without insurance authorization, patient may be responsible for the entire amount billed.      Precertification and Prior Authorizations:  If your physician has recommended that you have a procedure or additional testing performed the [facility]  Centralized Referral Team will contact your insurance carrier to obtain pre-certification or prior authorization.    You are strongly encouraged to contact your insurance carrier to verify that your procedure/test has been approved and is a COVERED benefit.  Although the [facility] Centralized Referral Team does its due diligence, the insurance carrier gives the disclaimer that \"Although the procedure is authorized, this does not guarantee payment.\"    Ultimately the patient is responsible for payment.   Thank you for your understanding in this matter.  Paperwork Completion:  If you require FMLA or disability paperwork for your recovery, please make sure to either drop it off or have it faxed to our office at [phone]. Be sure the form has your name and date of birth on it.  The form will be faxed to our Forms Department and they will complete it for you.  There is a 25$ fee for all forms that need to be filled out.  Please be aware there is a 10-14 day turnaround time.  You will need to sign a release of information (TANVI) form if your paperwork does not come with one.  You may call the Forms Department with any questions at [phone].  Their fax number is [phone].

## 2024-08-27 ENCOUNTER — HOSPITAL ENCOUNTER (OUTPATIENT)
Dept: GENERAL RADIOLOGY | Facility: HOSPITAL | Age: 89
Discharge: HOME | End: 2024-08-27
Payer: MEDICARE

## 2024-08-27 ENCOUNTER — OFFICE VISIT (OUTPATIENT)
Dept: SURGERY | Facility: CLINIC | Age: 89
End: 2024-08-27
Payer: MEDICARE

## 2024-08-27 VITALS
BODY MASS INDEX: 28 KG/M2 | OXYGEN SATURATION: 95 % | DIASTOLIC BLOOD PRESSURE: 76 MMHG | HEART RATE: 66 BPM | WEIGHT: 161 LBS | SYSTOLIC BLOOD PRESSURE: 116 MMHG

## 2024-08-27 DIAGNOSIS — S12.031D CLOSED NONDISPLACED FRACTURE OF POSTERIOR ARCH OF FIRST CERVICAL VERTEBRA WITH ROUTINE HEALING, SUBSEQUENT ENCOUNTER: ICD-10-CM

## 2024-08-27 DIAGNOSIS — S12.100D CLOSED ODONTOID FRACTURE WITH ROUTINE HEALING, SUBSEQUENT ENCOUNTER: Primary | ICD-10-CM

## 2024-08-27 DIAGNOSIS — S12.100D CLOSED ODONTOID FRACTURE WITH ROUTINE HEALING, SUBSEQUENT ENCOUNTER: ICD-10-CM

## 2024-08-27 PROCEDURE — 99214 OFFICE O/P EST MOD 30 MIN: CPT

## 2024-08-27 PROCEDURE — 72040 X-RAY EXAM NECK SPINE 2-3 VW: CPT

## 2024-08-27 NOTE — PATIENT INSTRUCTIONS
Refill policies:    Allow 2-3 business days for refills; controlled substances may take longer.  Contact your pharmacy at least 5 days prior to running out of medication and have them send an electronic request or submit request through the “request refill” option in your [employer] account.  Refills are not addressed on weekends; covering physicians do not authorize routine medications on weekends.  No narcotics or controlled substances are refilled after noon on Fridays or by on call physicians.  By law, narcotics must be electronically prescribed.  A 30 day supply with no refills is the maximum allowed.  If your prescription is due for a refill, you may be due for a follow up appointment.  To best provide you care, patients receiving routine medications need to be seen at least once a year.  Patients receiving narcotic/controlled substance medications need to be seen at least once every 3 months.  In the event that your preferred pharmacy does not have the requested medication in stock (e.g. Backordered), it is your responsibility to find another pharmacy that has the requested medication available.  We will gladly send a new prescription to that pharmacy at your request.    Scheduling Tests:    If your physician has ordered radiology tests such as MRI or CT scans, please contact Central Scheduling at [phone] right away to schedule the test.  Once scheduled, the [facility] Centralized Referral Team will work with your insurance carrier to obtain pre-certification or prior authorization.  Depending on your insurance carrier, approval may take 3-10 days.  It is highly recommended patients assure they have received an authorization before having a test performed.  If test is done without insurance authorization, patient may be responsible for the entire amount billed.      Precertification and Prior Authorizations:  If your physician has recommended that you have a procedure or additional testing performed the [facility]  Centralized Referral Team will contact your insurance carrier to obtain pre-certification or prior authorization.    You are strongly encouraged to contact your insurance carrier to verify that your procedure/test has been approved and is a COVERED benefit.  Although the [facility] Centralized Referral Team does its due diligence, the insurance carrier gives the disclaimer that \"Although the procedure is authorized, this does not guarantee payment.\"    Ultimately the patient is responsible for payment.   Thank you for your understanding in this matter.  Paperwork Completion:  If you require FMLA or disability paperwork for your recovery, please make sure to either drop it off or have it faxed to our office at [phone]. Be sure the form has your name and date of birth on it.  The form will be faxed to our Forms Department and they will complete it for you.  There is a 25$ fee for all forms that need to be filled out.  Please be aware there is a 10-14 day turnaround time.  You will need to sign a release of information (TANVI) form if your paperwork does not come with one.  You may call the Forms Department with any questions at [phone].  Their fax number is [phone].

## 2024-08-27 NOTE — PROGRESS NOTES
Patient: Luana Kraus  Medical Record Number: [id number]  YOB: 1925  PCP: None Pcp    Reason for visit: Cervical follow up    HISTORY OF CHIEF COMPLAINT:    Luana Kraus is a very pleasant 89 year old female who presents today for: C2 fracture, nondisplaced fracture of the posterior arch of C1.  Patient reports that she is doing quite well.  She endorses some mild neck pain upon flexion and extension as well as limited mobility on lateral rotation.  She has some neck discomfort when trying to sleep which she feels may be due to her pillow.  She denies radiating upper extremity pain, numbness or tingling. She reports that she fully weaned off the cervical collar in July.  Denies bowel or bladder incontinence.   The patient started PT 1 week ago which she states is going well so far.  They are working on her leg strength as well as her cervical ROM.    Past Medical History:    Atypical atrial flutter (HCC)    Bilateral carotid artery disease (HCC)    Cancer (HCC)    Cataract    Essential hypertension    HLD (hyperlipidemia)    Macular degeneration    Osteoarthritis      Past Surgical History:   Procedure Laterality Date          x3    Knee replacement surgery  2008    left      Family History   Problem Relation Age of Onset    Arthritis Mother       Social History     Socioeconomic History    Marital status:    Tobacco Use    Smoking status: Never    Smokeless tobacco: Never   Vaping Use    Vaping status: Never Used   Substance and Sexual Activity    Alcohol use: No     Alcohol/week: 0.0 standard drinks of alcohol    Drug use: No   Other Topics Concern    Caffeine Concern Yes     Comment: 2 a day    Exercise Yes     Comment: PT       Allergies   Allergen Reactions    Zolpidem RESTLESSNESS and JITTERY      Current Medications:  Current Outpatient Medications   Medication Sig Dispense Refill    cyclobenzaprine 5 MG Oral Tab Take 1 tablet (5 mg total) by mouth nightly. TAKE AT  BEDTIME      lidocaine 5 % External Patch APPLY 1 PATCH 1 PATCH ONCE A DAY  12 HOURS ON AND 12 HOURS OFF      LIDOCAINE PAIN RELIEF 4 % External Patch Place 1 patch onto the skin daily. REMOVE AFTER 12 HOURS      DULoxetine 20 MG Oral Cap DR Particles Take 1 capsule (20 mg total) by mouth daily.      amLODIPine 5 MG Oral Tab Take 1 tablet (5 mg total) by mouth every morning.      lisinopril 20 MG Oral Tab Take 1 tablet (20 mg total) by mouth daily.      atorvastatin 10 MG Oral Tab Take 1 tablet (10 mg total) by mouth nightly. 90 tablet 3    GABAPENTIN 600 MG Oral Tab TAKE ONE TABLET BY MOUTH IN MORNING AND NOON AND 2 TABLET IN THE EVENING 360 tablet 3    Acetaminophen (TYLENOL ARTHRITIS PAIN OR) Take 500 mg by mouth as needed (patient takes 1 tablet every 8 hours as needed for pain).      Multiple Vitamins-Minerals (PRESERVISION AREDS) Oral Tab Take by mouth.          REVIEW OF SYSTEMS   Comprehensive review of systems done. Negative except what is outlined in the above HPI.     PHYSICAL EXAMIMATION    weight is 161 lb (73 kg). Her blood pressure is 116/76 and her pulse is 66. Her oxygen saturation is 95%.   GENERAL: Very pleasant patient is in no apparent distress. Sitting comfortably in wheelchair.  HEENT: Normocephalic, atraumatic.  RESPIRATORY RATE: Easy and Even   SKIN: Warm and dry  NEURO: Awake, alert and orientated. Speech fluent, comprehension intact, answering questions appropriately.     SPINE:  Gait/Coordination: Gait deferred  Sensation: Sensory deficits noted on bilateral upper extremities to light touch: None   Cervical/Lumbar Integument: Skin over cervical spine is intact without rashes, excoriations, lesions or erythema.   ROM:  Cervical Flexion  + mild pain   Cervical Extension + mild pain   Cervical Rotation  Pain free, limited ROM  Palpation: Non tender to palpation of midline cervical spine or paraspinal musculature    Upper Extremity Strength:     Deltoid  Biceps  Triceps    Intrinsics       Right 5 5 5 5 5     Left 5 5 5 5 5   Lower Extremity Strength:     Iliopsoas  Hamstrings   Quads    D-flexion P-flexion   Right       5         5       5         5 5   Left       5         5       5         5 5     DATA:   None    IMAGING:   XR Cervical Spine from 8/27/2024  Narrative   PROCEDURE:  XR CERVICAL SPINE (2 VIEWS) (CPT=72040)     COMPARISON:  [facility] , XR, XR CERVICAL SPINE (4VIEWS) (CPT=72050), 5/31/2024, 10:32 AM.     INDICATIONS:  S12.031D Closed nondisplaced fracture of posterior arch of first cervical vertebra with routine healing, subsequent encounter S12.100D Closed odontoid fracture*     PATIENT STATED HISTORY: (As transcribed by Technologist)  Patient states she fractured two cervical vertebrae 6-7 months ago. She has limited range of motion when moving her head. Shares that she has pain when moving her head to the left and right and is   worse when she moves her head down.           FINDINGS:    Stable alignment and positioning with mild apex right cervical curvature and mild apex left upper thoracic curvature, and straightening of the expected lordosis.  A few mm anterolisthesis at C3-C4, C4-C5 and C5-C6 is similar to the prior.    Multilevel degenerative changes are seen, similar to the prior, most pronounced at C5-C6 and C6-C7, radiographically moderate to severe.  Maintained vertebral body heights.                      Impression   CONCLUSION:  Scoliotic and degenerative changes.  As above.        LOCATION:  [facility]        Dictated by (CST): Dat Hallman MD on 8/27/2024 at 10:37 AM      Finalized by (CST): Dat Hallman MD on 8/27/2024 at 10:38 AM        MEDICAL DECISION MAKING:     ASSESSMENT and PLAN:  1. Closed odontoid fracture with routine healing, subsequent encounter    2. Closed nondisplaced fracture of posterior arch of first cervical vertebra with routine healing, subsequent encounter      Very pleasant 89-year-old female with C1, C2 fractures s/p fall in April 2024.  Patient overall  doing well clinically, has fully weaned off of cervical collar, strong on exam today.  Serial cervical x-rays demonstrate healed fractures at C1 and C2 with stable alignment of the cervical curvature.  At this point, I think her persistent pain is musculoskeletal in nature and likely related to recently starting PT and having weaned off the cervical collar.  PLAN:   1. Imaging:    Reviewed: Cervical XR from 8/27/2024    -Reviewed with Dr. Linton. Fracture at C2 appears to be healed without displacement. Stable alignment of cervical spine.   2. PT: Recommend continued physical therapy for improved cervical ROM and strengthening   3. Recommended osteo pillow for sleep.  4. Follow up as needed or call or follow up sooner or go to the ED for any new, worsening or concerning signs or symptoms       Total visit time: 30 minutes   More than 50% spent coordinating care, providing patient education, reviewing imaging and counseling.    Elsie Cantor M.S., PA-C  [address], [address]  [place] [address]  [phone]  8/27/2024 3:42 PM

## 2024-08-27 NOTE — PROGRESS NOTES
Established patient-  Reason for follow up: Review Imaging     C/O Cervical Pain  Pain Scale: 5/10     Prior Imaging: Cervical XRay

## 2024-10-23 ENCOUNTER — TELEPHONE (OUTPATIENT)
Facility: CLINIC | Age: 89
End: 2024-10-23

## 2024-10-23 ENCOUNTER — TELEPHONE (OUTPATIENT)
Dept: ORTHOPEDICS CLINIC | Facility: CLINIC | Age: 89
End: 2024-10-23

## 2024-10-23 DIAGNOSIS — M25.561 RIGHT KNEE PAIN, UNSPECIFIED CHRONICITY: Primary | ICD-10-CM

## 2024-10-23 NOTE — TELEPHONE ENCOUNTER
XR ordered and scheduled per Ortho protocol.   Called patient, informed daughter to have her arrive 15-20 minutes prior to appointment with Dr. Dawson

## 2024-10-23 NOTE — TELEPHONE ENCOUNTER
10/24/2024 Alan Mckeon MD    Pls r/s pt with another provider as MD does not treat for knees.    I would offer Dawson first as he will have earliest available.    Thanks!

## 2024-10-24 ENCOUNTER — HOSPITAL ENCOUNTER (OUTPATIENT)
Dept: GENERAL RADIOLOGY | Age: 89
Discharge: HOME | End: 2024-10-24
Attending: STUDENT IN AN ORGANIZED HEALTH CARE EDUCATION/TRAINING PROGRAM
Payer: MEDICARE

## 2024-10-24 ENCOUNTER — TELEPHONE (OUTPATIENT)
Facility: CLINIC | Age: 89
End: 2024-10-24

## 2024-10-24 ENCOUNTER — OFFICE VISIT (OUTPATIENT)
Facility: CLINIC | Age: 89
End: 2024-10-24
Payer: MEDICARE

## 2024-10-24 VITALS — HEIGHT: 64 IN | BODY MASS INDEX: 29.02 KG/M2 | WEIGHT: 170 LBS

## 2024-10-24 DIAGNOSIS — M25.569 KNEE PAIN, UNSPECIFIED CHRONICITY, UNSPECIFIED LATERALITY: ICD-10-CM

## 2024-10-24 DIAGNOSIS — M17.11 PRIMARY OSTEOARTHRITIS OF RIGHT KNEE: Primary | ICD-10-CM

## 2024-10-24 DIAGNOSIS — M25.561 RIGHT KNEE PAIN, UNSPECIFIED CHRONICITY: ICD-10-CM

## 2024-10-24 PROCEDURE — 73564 X-RAY EXAM KNEE 4 OR MORE: CPT | Performed by: STUDENT IN AN ORGANIZED HEALTH CARE EDUCATION/TRAINING PROGRAM

## 2024-10-24 NOTE — PROGRESS NOTES
Orthopaedic Surgery  [address] [address] [address]   [phone]      Chief Complaint:  Right Knee Pain    History of Present Illness:   Luana Kraus is a 89 year old female seen in clinic today as new for evaluation of their right knee. This has been ongoing for many years. Pain is located over the anterior aspect of the knee and described as aching. Their joint pain interferes with their activities of daily life such as walking, sleeping, getting up from sitting, and navigating uneven surfaces. Their condition is stable.  Associated with their joint pain, they report stiffness. Their symptoms are worsened by weightbearing activity and as the day progresses. They have difficulty with walking prolonged distances on flat surfaces and are unable to do stairs. Their symptoms are made better by rest. Has arthroscopic incisions over the knee, but she is unable to recall any procedure to the knee. Prior history of contralateral LEFT total knee arthroplasty done in 2008.    Prior imaging studies have included none. Treatment so far has consisted of conservative management including physical therapy and corticosteroid injections. She is currently in PT. Patient reports receiving a CSI about 1.5 years ago which did not alleviate her symptoms.    Activities of Daily Life:  Typically, they are able to walk limited blocks  They use stairs: avoid completely or unable to do stairs  Assistive devices used: walker      PMH/PSH/Family History/Social History/Meds/Allergies:   Past Medical History:    Atypical atrial flutter (HCC)    Bilateral carotid artery disease (HCC)    Cancer (HCC)    Cataract    Essential hypertension    HLD (hyperlipidemia)    Macular degeneration    Osteoarthritis      Diagnosis of DM noted in PCP's office note from 6/2023, but last A1c appears to be 6.4, consistent with pre-diabetes based on lab criteria. Patient and family confirm pre-diabetes.    Past Surgical History:    Procedure Laterality Date          x3    Knee replacement surgery  2008    left        Family History   Problem Relation Age of Onset    Arthritis Mother         Social History     Socioeconomic History    Marital status:      Spouse name: Not on file    Number of children: Not on file    Years of education: Not on file    Highest education level: Not on file   Occupational History    Not on file   Tobacco Use    Smoking status: Never    Smokeless tobacco: Never   Vaping Use    Vaping status: Never Used   Substance and Sexual Activity    Alcohol use: No     Alcohol/week: 0.0 standard drinks of alcohol    Drug use: No    Sexual activity: Not on file   Other Topics Concern     Service Not Asked    Blood Transfusions Not Asked    Caffeine Concern Yes     Comment: 2 a day    Occupational Exposure Not Asked    Hobby Hazards Not Asked    Sleep Concern Not Asked    Stress Concern Not Asked    Weight Concern Not Asked    Special Diet Not Asked    Back Care Not Asked    Exercise Yes     Comment: PT     Bike Helmet Not Asked    Seat Belt Not Asked    Self-Exams Not Asked   Social History Narrative    Not on file     Social Drivers of Health     Financial Resource Strain: Not on file   Food Insecurity: No Food Insecurity (2024)    Food Insecurity     Food Insecurity: Never true   Transportation Needs: No Transportation Needs (2024)    Transportation Needs     Lack of Transportation: No   Physical Activity: Not on file   Stress: Not on file   Social Connections: Not on file   Housing Stability: Low Risk  (2024)    Housing Stability     Housing Instability: No     Housing Instability Emergency: Not on file     Crib or Bassinette: Not on file        Current Outpatient Medications   Medication Instructions    Acetaminophen (TYLENOL ARTHRITIS PAIN OR) 500 mg, Oral, As needed    amLODIPine (NORVASC) 5 mg, Oral, Every morning    atorvastatin (LIPITOR) 10 mg, Oral, Nightly    cyclobenzaprine  (FLEXERIL) 5 mg, Oral, Nightly, TAKE AT BEDTIME    DULoxetine (CYMBALTA) 20 mg, Oral, Daily    GABAPENTIN 600 MG Oral Tab TAKE ONE TABLET BY MOUTH IN MORNING AND NOON AND 2 TABLET IN THE EVENING    lidocaine 5 % External Patch APPLY 1 PATCH 1 PATCH ONCE A DAY  12 HOURS ON AND 12 HOURS OFF    LIDOCAINE PAIN RELIEF 4 % External Patch 1 patch, Transdermal, Daily, REMOVE AFTER 12 HOURS    lisinopril (PRINIVIL; ZESTRIL) 20 mg, Oral, Daily    Multiple Vitamins-Minerals (PRESERVISION AREDS) Oral Tab Oral        Allergies[1]       Physical Exam:   Vitals:    10/24/24 1021   Weight: 170 lb (77.1 kg)   Height: 5' 4\" (1.626 m)     Estimated body mass index is 29.18 kg/m² as calculated from the following:    Height as of this encounter: 5' 4\" (1.626 m).    Weight as of this encounter: 170 lb (77.1 kg).    Constitutional: No acute distress, well nourished.  Eyes: Anicteric sclera, pink conjunctiva  Ears, Nose, Mouth and Throat: Normocephalic, atraumatic, moist mucous membranes  Cardiovascular: No pitting edema or varicosities in the lower extremities. Maneuvers demonstrate a negative Tonja's sign. No palpable cords in calf noted.   Respiratory: No respiratory distress, normal respiratory rhythm and effort   Neurological:  Oriented to person, place, and time.  Psychological:  Appropriate mood and affect.    Comprehensive Right Knee Exam:      Inspection: No erythema, ecchymoses, or wounds. No rash. Prior arthroscopy portals noted x 3, appear well healed. Mild effusion. Moderate quad atrophy  Alignment: mild varus  ROM: 5 - 120 degrees, flexion contracture: < 5 degrees, quad lag: no  Stability: A/P stress: stable, firm endpoint, M/L stress: stable, firm endpoint  Pain or crepitus with ROM?: Yes.   Tenderness to palpation at: medial and lateral joint line, lateral aspects of patella  Strength: Intact 5/5 strength SLR and TA/GS/FHL/EHL  Sensation: Grossly intact to light touch over SPN/DPN/Saph/Sural/Tibial nerve  distributions  Vasc: Warm perfused extremity      Imaging:   Weightbearing XRs of the right knee were obtained with AP, PA Flex, sunrise, and lateral views    They show severe degenerative changes of the knee with joint space narrowing involving the medial and the patellofemoral compartment(s) with bone-on-bone joint space narrowing, osteophyte formation, and subchondral sclerosis. No fracture or dislocation seen    I personally reviewed and interpreted the radiographs.      Assessment:     ICD-10-CM    1. Primary osteoarthritis of right knee  M17.11              Plan:     At today's visit I discussed with the patient their diagnosis and the factors considered to form this diagnosis. Their history and physical exam and radiographic findings are consistent with arthritis of the knee. As we discussed their diagnosis, information was provided regarding the underlying pathology and the natural course of this progressive condition.    During our discussion, we detailed various treatment options available. We counseled the patient on treatment options. Major joint arthritis is usually a chronic condition that can be effectively managed with conservative modalities. Once these conservative measures fail to provide reasonable therapeutic benefit, surgical procedures may be indicated. But given her age, I do recommend continuing conservative management.    Conservative measures discussed include activity modification, home exercise programs, physical therapy, oral anti-inflammatories and acetaminophen, braces, assistive devices, and intraarticular injections.     In consideration of their prior treatments, today, I have recommended continued conservative treatment and recommend HA injections. We discussed that their plan is customized and can be modified at any time with new information or changing exam findings.      Procedure Note  Risks and benefits of knee injection discussed with the patient, with risks including but  not limited to pain and swelling at the injection site and/or within the knee joint, infection, elevation in blood pressure and/or glucose levels, facial flushing.  After informed consent, the patient's right knee was marked, locally anesthetized with skin refrigerant, prepped with topical antiseptic, and injected with 3mL of 60mg/3mL Durolane through the inferolateral portal.  Hemostasis was achieved and a band-aid was applied.  The patient tolerated the procedure well.    The patient was instructed to avoid strenuous activity for 3 days and may use OTC analgesics and R.I.C.E. as needed for any pain or swelling. The patient was reminded that it may take up to a month to take effect and may not work at all. If the injection provides sustained relief it may be repeated every 6 months.      Jonathan Dawson MD  Adult Hip and Knee Reconstruction    Department of Orthopaedic Surgery  [facility]     [address] [place] [address]  [address] [place] [address]     t: [phone]  f: [phone]       [email or web address]         [1]   Allergies  Allergen Reactions    Zolpidem RESTLESSNESS and JITTERY

## 2025-01-02 ENCOUNTER — HOSPITAL ENCOUNTER (OUTPATIENT)
Dept: MRI IMAGING | Age: OVER 89
Discharge: HOME | End: 2025-01-02
Attending: HOSPITALIST
Payer: MEDICARE

## 2025-01-02 ENCOUNTER — HOSPITAL ENCOUNTER (OUTPATIENT)
Dept: GENERAL RADIOLOGY | Age: OVER 89
Discharge: HOME | End: 2025-01-02
Attending: HOSPITALIST
Payer: MEDICARE

## 2025-01-02 DIAGNOSIS — M54.30 SCIATICA: ICD-10-CM

## 2025-01-02 DIAGNOSIS — M79.671 FOOT PAIN, RIGHT: ICD-10-CM

## 2025-01-02 PROCEDURE — 72195 MRI PELVIS W/O DYE: CPT | Performed by: HOSPITALIST

## 2025-01-02 PROCEDURE — 73620 X-RAY EXAM OF FOOT: CPT | Performed by: HOSPITALIST

## 2025-01-02 PROCEDURE — 72148 MRI LUMBAR SPINE W/O DYE: CPT | Performed by: HOSPITALIST

## 2025-01-08 ENCOUNTER — LAB REQUISITION (OUTPATIENT)
Dept: LAB | Facility: HOSPITAL | Age: OVER 89
End: 2025-01-08
Payer: MEDICARE

## 2025-01-08 DIAGNOSIS — M54.16 RADICULOPATHY, LUMBAR REGION: ICD-10-CM

## 2025-01-08 LAB
ALBUMIN SERPL-MCNC: 3.8 G/DL (ref 3.2–4.8)
ALBUMIN/GLOB SERPL: 1.7 {RATIO} (ref 1–2)
ALP LIVER SERPL-CCNC: 72 U/L
ALT SERPL-CCNC: 10 U/L
ANION GAP SERPL CALC-SCNC: 7 MMOL/L (ref 0–18)
AST SERPL-CCNC: 13 U/L (ref ?–34)
BASOPHILS # BLD AUTO: 0.01 X10(3) UL (ref 0–0.2)
BASOPHILS NFR BLD AUTO: 0.1 %
BILIRUB SERPL-MCNC: 0.6 MG/DL (ref 0.2–0.9)
BUN BLD-MCNC: 28 MG/DL (ref 9–23)
CALCIUM BLD-MCNC: 8.9 MG/DL (ref 8.7–10.4)
CHLORIDE SERPL-SCNC: 109 MMOL/L (ref 98–112)
CO2 SERPL-SCNC: 26 MMOL/L (ref 21–32)
CREAT BLD-MCNC: 1.14 MG/DL
EGFRCR SERPLBLD CKD-EPI 2021: 43 ML/MIN/1.73M2 (ref 60–?)
EOSINOPHIL # BLD AUTO: 0.07 X10(3) UL (ref 0–0.7)
EOSINOPHIL NFR BLD AUTO: 1 %
ERYTHROCYTE [DISTWIDTH] IN BLOOD BY AUTOMATED COUNT: 13.7 %
FASTING STATUS PATIENT QL REPORTED: YES
GLOBULIN PLAS-MCNC: 2.3 G/DL (ref 2–3.5)
GLUCOSE BLD-MCNC: 114 MG/DL (ref 70–99)
HCT VFR BLD AUTO: 36.6 %
HGB BLD-MCNC: 11.6 G/DL
IMM GRANULOCYTES # BLD AUTO: 0.02 X10(3) UL (ref 0–1)
IMM GRANULOCYTES NFR BLD: 0.3 %
LYMPHOCYTES # BLD AUTO: 1.75 X10(3) UL (ref 1–4)
LYMPHOCYTES NFR BLD AUTO: 26.2 %
MCH RBC QN AUTO: 30.1 PG (ref 26–34)
MCHC RBC AUTO-ENTMCNC: 31.7 G/DL (ref 31–37)
MCV RBC AUTO: 95.1 FL
MONOCYTES # BLD AUTO: 0.61 X10(3) UL (ref 0.1–1)
MONOCYTES NFR BLD AUTO: 9.1 %
NEUTROPHILS # BLD AUTO: 4.22 X10 (3) UL (ref 1.5–7.7)
NEUTROPHILS # BLD AUTO: 4.22 X10(3) UL (ref 1.5–7.7)
NEUTROPHILS NFR BLD AUTO: 63.3 %
OSMOLALITY SERPL CALC.SUM OF ELEC: 300 MOSM/KG (ref 275–295)
PLATELET # BLD AUTO: 254 10(3)UL (ref 150–450)
POTASSIUM SERPL-SCNC: 4.2 MMOL/L (ref 3.5–5.1)
PROT SERPL-MCNC: 6.1 G/DL (ref 5.7–8.2)
RBC # BLD AUTO: 3.85 X10(6)UL
SODIUM SERPL-SCNC: 142 MMOL/L (ref 136–145)
WBC # BLD AUTO: 6.7 X10(3) UL (ref 4–11)

## 2025-01-08 PROCEDURE — 36415 COLL VENOUS BLD VENIPUNCTURE: CPT | Performed by: HOSPITALIST

## 2025-01-08 PROCEDURE — 85025 COMPLETE CBC W/AUTO DIFF WBC: CPT | Performed by: HOSPITALIST

## 2025-01-08 PROCEDURE — 80053 COMPREHEN METABOLIC PANEL: CPT | Performed by: HOSPITALIST

## 2025-01-15 ENCOUNTER — LAB REQUISITION (OUTPATIENT)
Dept: LAB | Facility: HOSPITAL | Age: OVER 89
End: 2025-01-15
Payer: MEDICARE

## 2025-01-15 DIAGNOSIS — M54.16 RADICULOPATHY, LUMBAR REGION: ICD-10-CM

## 2025-01-15 LAB
ALBUMIN SERPL-MCNC: 3.7 G/DL (ref 3.2–4.8)
ALBUMIN/GLOB SERPL: 1.7 {RATIO} (ref 1–2)
ALP LIVER SERPL-CCNC: 77 U/L
ALT SERPL-CCNC: 13 U/L
ANION GAP SERPL CALC-SCNC: 7 MMOL/L (ref 0–18)
AST SERPL-CCNC: 15 U/L (ref ?–34)
BASOPHILS # BLD AUTO: 0.03 X10(3) UL (ref 0–0.2)
BASOPHILS NFR BLD AUTO: 0.5 %
BILIRUB SERPL-MCNC: 0.6 MG/DL (ref 0.2–0.9)
BUN BLD-MCNC: 29 MG/DL (ref 9–23)
CALCIUM BLD-MCNC: 8.9 MG/DL (ref 8.7–10.6)
CHLORIDE SERPL-SCNC: 109 MMOL/L (ref 98–112)
CO2 SERPL-SCNC: 30 MMOL/L (ref 21–32)
CREAT BLD-MCNC: 1.05 MG/DL
EGFRCR SERPLBLD CKD-EPI 2021: 48 ML/MIN/1.73M2 (ref 60–?)
EOSINOPHIL # BLD AUTO: 0.07 X10(3) UL (ref 0–0.7)
EOSINOPHIL NFR BLD AUTO: 1.1 %
ERYTHROCYTE [DISTWIDTH] IN BLOOD BY AUTOMATED COUNT: 13.2 %
FASTING STATUS PATIENT QL REPORTED: YES
GLOBULIN PLAS-MCNC: 2.2 G/DL (ref 2–3.5)
GLUCOSE BLD-MCNC: 112 MG/DL (ref 70–99)
HCT VFR BLD AUTO: 36.8 %
HGB BLD-MCNC: 12.1 G/DL
IMM GRANULOCYTES # BLD AUTO: 0.03 X10(3) UL (ref 0–1)
IMM GRANULOCYTES NFR BLD: 0.5 %
LYMPHOCYTES # BLD AUTO: 2.34 X10(3) UL (ref 1–4)
LYMPHOCYTES NFR BLD AUTO: 35.4 %
MCH RBC QN AUTO: 31 PG (ref 26–34)
MCHC RBC AUTO-ENTMCNC: 32.9 G/DL (ref 31–37)
MCV RBC AUTO: 94.4 FL
MONOCYTES # BLD AUTO: 0.62 X10(3) UL (ref 0.1–1)
MONOCYTES NFR BLD AUTO: 9.4 %
NEUTROPHILS # BLD AUTO: 3.52 X10 (3) UL (ref 1.5–7.7)
NEUTROPHILS # BLD AUTO: 3.52 X10(3) UL (ref 1.5–7.7)
NEUTROPHILS NFR BLD AUTO: 53.1 %
OSMOLALITY SERPL CALC.SUM OF ELEC: 309 MOSM/KG (ref 275–295)
PLATELET # BLD AUTO: 243 10(3)UL (ref 150–450)
POTASSIUM SERPL-SCNC: 4.8 MMOL/L (ref 3.5–5.1)
PROT SERPL-MCNC: 5.9 G/DL (ref 5.7–8.2)
RBC # BLD AUTO: 3.9 X10(6)UL
SODIUM SERPL-SCNC: 146 MMOL/L (ref 136–145)
WBC # BLD AUTO: 6.6 X10(3) UL (ref 4–11)

## 2025-01-15 PROCEDURE — 80053 COMPREHEN METABOLIC PANEL: CPT | Performed by: HOSPITALIST

## 2025-01-15 PROCEDURE — 85025 COMPLETE CBC W/AUTO DIFF WBC: CPT | Performed by: HOSPITALIST

## 2025-01-22 ENCOUNTER — LAB REQUISITION (OUTPATIENT)
Dept: LAB | Facility: HOSPITAL | Age: OVER 89
End: 2025-01-22
Payer: MEDICARE

## 2025-01-22 DIAGNOSIS — E11.42 TYPE 2 DIABETES MELLITUS WITH DIABETIC POLYNEUROPATHY (HCC): ICD-10-CM

## 2025-01-22 LAB
ALBUMIN SERPL-MCNC: 3.6 G/DL (ref 3.2–4.8)
ALBUMIN/GLOB SERPL: 1.7 {RATIO} (ref 1–2)
ALP LIVER SERPL-CCNC: 72 U/L
ALT SERPL-CCNC: 12 U/L
ANION GAP SERPL CALC-SCNC: 6 MMOL/L (ref 0–18)
AST SERPL-CCNC: 16 U/L (ref ?–34)
BASOPHILS # BLD AUTO: 0.02 X10(3) UL (ref 0–0.2)
BASOPHILS NFR BLD AUTO: 0.3 %
BILIRUB SERPL-MCNC: 0.6 MG/DL (ref 0.2–0.9)
BUN BLD-MCNC: 28 MG/DL (ref 9–23)
CALCIUM BLD-MCNC: 8.7 MG/DL (ref 8.7–10.6)
CHLORIDE SERPL-SCNC: 108 MMOL/L (ref 98–112)
CO2 SERPL-SCNC: 30 MMOL/L (ref 21–32)
CREAT BLD-MCNC: 1.13 MG/DL
EGFRCR SERPLBLD CKD-EPI 2021: 44 ML/MIN/1.73M2 (ref 60–?)
EOSINOPHIL # BLD AUTO: 0.15 X10(3) UL (ref 0–0.7)
EOSINOPHIL NFR BLD AUTO: 2.2 %
ERYTHROCYTE [DISTWIDTH] IN BLOOD BY AUTOMATED COUNT: 13.8 %
FASTING STATUS PATIENT QL REPORTED: YES
GLOBULIN PLAS-MCNC: 2.1 G/DL (ref 2–3.5)
GLUCOSE BLD-MCNC: 129 MG/DL (ref 70–99)
HCT VFR BLD AUTO: 34.8 %
HGB BLD-MCNC: 11.5 G/DL
IMM GRANULOCYTES # BLD AUTO: 0.03 X10(3) UL (ref 0–1)
IMM GRANULOCYTES NFR BLD: 0.4 %
LYMPHOCYTES # BLD AUTO: 1.77 X10(3) UL (ref 1–4)
LYMPHOCYTES NFR BLD AUTO: 26.5 %
MCH RBC QN AUTO: 31 PG (ref 26–34)
MCHC RBC AUTO-ENTMCNC: 33 G/DL (ref 31–37)
MCV RBC AUTO: 93.8 FL
MONOCYTES # BLD AUTO: 0.63 X10(3) UL (ref 0.1–1)
MONOCYTES NFR BLD AUTO: 9.4 %
NEUTROPHILS # BLD AUTO: 4.08 X10 (3) UL (ref 1.5–7.7)
NEUTROPHILS # BLD AUTO: 4.08 X10(3) UL (ref 1.5–7.7)
NEUTROPHILS NFR BLD AUTO: 61.2 %
OSMOLALITY SERPL CALC.SUM OF ELEC: 305 MOSM/KG (ref 275–295)
PLATELET # BLD AUTO: 181 10(3)UL (ref 150–450)
POTASSIUM SERPL-SCNC: 5 MMOL/L (ref 3.5–5.1)
PROT SERPL-MCNC: 5.7 G/DL (ref 5.7–8.2)
RBC # BLD AUTO: 3.71 X10(6)UL
SODIUM SERPL-SCNC: 144 MMOL/L (ref 136–145)
WBC # BLD AUTO: 6.7 X10(3) UL (ref 4–11)

## 2025-01-22 PROCEDURE — 80053 COMPREHEN METABOLIC PANEL: CPT | Performed by: HOSPITALIST

## 2025-01-22 PROCEDURE — 85025 COMPLETE CBC W/AUTO DIFF WBC: CPT | Performed by: HOSPITALIST

## 2025-01-28 ENCOUNTER — LAB REQUISITION (OUTPATIENT)
Dept: LAB | Facility: HOSPITAL | Age: OVER 89
End: 2025-01-28
Payer: MEDICARE

## 2025-01-28 DIAGNOSIS — R11.10 VOMITING, UNSPECIFIED: ICD-10-CM

## 2025-01-28 LAB — NOROVIRUS GI/GII PCR: POSITIVE

## 2025-01-28 PROCEDURE — 87798 DETECT AGENT NOS DNA AMP: CPT | Performed by: HOSPITALIST

## 2025-01-30 ENCOUNTER — LAB REQUISITION (OUTPATIENT)
Dept: LAB | Facility: HOSPITAL | Age: OVER 89
End: 2025-01-30
Payer: MEDICARE

## 2025-01-30 DIAGNOSIS — M54.16 RADICULOPATHY, LUMBAR REGION: ICD-10-CM

## 2025-01-30 LAB
ALBUMIN SERPL-MCNC: 3.7 G/DL (ref 3.2–4.8)
ALBUMIN/GLOB SERPL: 1.5 {RATIO} (ref 1–2)
ALP LIVER SERPL-CCNC: 59 U/L
ALT SERPL-CCNC: 14 U/L
ANION GAP SERPL CALC-SCNC: 3 MMOL/L (ref 0–18)
AST SERPL-CCNC: 28 U/L (ref ?–34)
BASOPHILS # BLD AUTO: 0.01 X10(3) UL (ref 0–0.2)
BASOPHILS NFR BLD AUTO: 0.2 %
BILIRUB SERPL-MCNC: 0.7 MG/DL (ref 0.2–0.9)
BUN BLD-MCNC: 21 MG/DL (ref 9–23)
CALCIUM BLD-MCNC: 8.9 MG/DL (ref 8.7–10.6)
CHLORIDE SERPL-SCNC: 109 MMOL/L (ref 98–112)
CO2 SERPL-SCNC: 31 MMOL/L (ref 21–32)
CREAT BLD-MCNC: 1.03 MG/DL
EGFRCR SERPLBLD CKD-EPI 2021: 49 ML/MIN/1.73M2 (ref 60–?)
EOSINOPHIL # BLD AUTO: 0.07 X10(3) UL (ref 0–0.7)
EOSINOPHIL NFR BLD AUTO: 1.6 %
ERYTHROCYTE [DISTWIDTH] IN BLOOD BY AUTOMATED COUNT: 13.1 %
FASTING STATUS PATIENT QL REPORTED: YES
GLOBULIN PLAS-MCNC: 2.4 G/DL (ref 2–3.5)
GLUCOSE BLD-MCNC: 116 MG/DL (ref 70–99)
HCT VFR BLD AUTO: 36.7 %
HGB BLD-MCNC: 12.1 G/DL
IMM GRANULOCYTES # BLD AUTO: 0.01 X10(3) UL (ref 0–1)
IMM GRANULOCYTES NFR BLD: 0.2 %
LYMPHOCYTES # BLD AUTO: 1.9 X10(3) UL (ref 1–4)
LYMPHOCYTES NFR BLD AUTO: 43.2 %
MCH RBC QN AUTO: 30.6 PG (ref 26–34)
MCHC RBC AUTO-ENTMCNC: 33 G/DL (ref 31–37)
MCV RBC AUTO: 92.9 FL
MONOCYTES # BLD AUTO: 0.4 X10(3) UL (ref 0.1–1)
MONOCYTES NFR BLD AUTO: 9.1 %
NEUTROPHILS # BLD AUTO: 2.01 X10 (3) UL (ref 1.5–7.7)
NEUTROPHILS # BLD AUTO: 2.01 X10(3) UL (ref 1.5–7.7)
NEUTROPHILS NFR BLD AUTO: 45.7 %
OSMOLALITY SERPL CALC.SUM OF ELEC: 300 MOSM/KG (ref 275–295)
PLATELET # BLD AUTO: 170 10(3)UL (ref 150–450)
POTASSIUM SERPL-SCNC: 4.8 MMOL/L (ref 3.5–5.1)
PROT SERPL-MCNC: 6.1 G/DL (ref 5.7–8.2)
RBC # BLD AUTO: 3.95 X10(6)UL
SODIUM SERPL-SCNC: 143 MMOL/L (ref 136–145)
WBC # BLD AUTO: 4.4 X10(3) UL (ref 4–11)

## 2025-01-30 PROCEDURE — 80053 COMPREHEN METABOLIC PANEL: CPT | Performed by: HOSPITALIST

## 2025-01-30 PROCEDURE — 85025 COMPLETE CBC W/AUTO DIFF WBC: CPT | Performed by: HOSPITALIST

## 2025-02-06 ENCOUNTER — LAB REQUISITION (OUTPATIENT)
Dept: LAB | Facility: HOSPITAL | Age: OVER 89
End: 2025-02-06
Payer: MEDICARE

## 2025-02-06 DIAGNOSIS — M54.30 SCIATICA, UNSPECIFIED SIDE: ICD-10-CM

## 2025-02-06 LAB
ALBUMIN SERPL-MCNC: 4 G/DL (ref 3.2–4.8)
ALBUMIN/GLOB SERPL: 1.6 {RATIO} (ref 1–2)
ALP LIVER SERPL-CCNC: 70 U/L
ALT SERPL-CCNC: 16 U/L
ANION GAP SERPL CALC-SCNC: 9 MMOL/L (ref 0–18)
AST SERPL-CCNC: 23 U/L (ref ?–34)
BASOPHILS # BLD AUTO: 0.03 X10(3) UL (ref 0–0.2)
BASOPHILS NFR BLD AUTO: 0.4 %
BILIRUB SERPL-MCNC: 0.7 MG/DL (ref 0.2–0.9)
BUN BLD-MCNC: 26 MG/DL (ref 9–23)
CALCIUM BLD-MCNC: 9 MG/DL (ref 8.7–10.6)
CHLORIDE SERPL-SCNC: 105 MMOL/L (ref 98–112)
CO2 SERPL-SCNC: 29 MMOL/L (ref 21–32)
CREAT BLD-MCNC: 1.08 MG/DL
EGFRCR SERPLBLD CKD-EPI 2021: 46 ML/MIN/1.73M2 (ref 60–?)
EOSINOPHIL # BLD AUTO: 0.17 X10(3) UL (ref 0–0.7)
EOSINOPHIL NFR BLD AUTO: 2.3 %
ERYTHROCYTE [DISTWIDTH] IN BLOOD BY AUTOMATED COUNT: 13.2 %
FASTING STATUS PATIENT QL REPORTED: YES
GLOBULIN PLAS-MCNC: 2.5 G/DL (ref 2–3.5)
GLUCOSE BLD-MCNC: 124 MG/DL (ref 70–99)
HCT VFR BLD AUTO: 40 %
HGB BLD-MCNC: 13 G/DL
IMM GRANULOCYTES # BLD AUTO: 0.02 X10(3) UL (ref 0–1)
IMM GRANULOCYTES NFR BLD: 0.3 %
LYMPHOCYTES # BLD AUTO: 2.44 X10(3) UL (ref 1–4)
LYMPHOCYTES NFR BLD AUTO: 33.1 %
MCH RBC QN AUTO: 30.2 PG (ref 26–34)
MCHC RBC AUTO-ENTMCNC: 32.5 G/DL (ref 31–37)
MCV RBC AUTO: 92.8 FL
MONOCYTES # BLD AUTO: 0.69 X10(3) UL (ref 0.1–1)
MONOCYTES NFR BLD AUTO: 9.4 %
NEUTROPHILS # BLD AUTO: 4.02 X10 (3) UL (ref 1.5–7.7)
NEUTROPHILS # BLD AUTO: 4.02 X10(3) UL (ref 1.5–7.7)
NEUTROPHILS NFR BLD AUTO: 54.5 %
OSMOLALITY SERPL CALC.SUM OF ELEC: 302 MOSM/KG (ref 275–295)
PLATELET # BLD AUTO: 303 10(3)UL (ref 150–450)
POTASSIUM SERPL-SCNC: 4.9 MMOL/L (ref 3.5–5.1)
PROT SERPL-MCNC: 6.5 G/DL (ref 5.7–8.2)
RBC # BLD AUTO: 4.31 X10(6)UL
SODIUM SERPL-SCNC: 143 MMOL/L (ref 136–145)
WBC # BLD AUTO: 7.4 X10(3) UL (ref 4–11)

## 2025-02-06 PROCEDURE — 80053 COMPREHEN METABOLIC PANEL: CPT | Performed by: HOSPITALIST

## 2025-02-06 PROCEDURE — 85025 COMPLETE CBC W/AUTO DIFF WBC: CPT | Performed by: HOSPITALIST

## 2025-03-03 PROCEDURE — 87086 URINE CULTURE/COLONY COUNT: CPT | Performed by: HOSPITALIST

## 2025-03-03 PROCEDURE — 81001 URINALYSIS AUTO W/SCOPE: CPT | Performed by: HOSPITALIST

## 2025-03-04 ENCOUNTER — LAB REQUISITION (OUTPATIENT)
Dept: LAB | Facility: HOSPITAL | Age: OVER 89
End: 2025-03-04
Payer: MEDICARE

## 2025-03-04 DIAGNOSIS — R30.9 PAINFUL MICTURITION, UNSPECIFIED: ICD-10-CM

## 2025-03-04 LAB
BILIRUB UR QL STRIP.AUTO: NEGATIVE
CLARITY UR REFRACT.AUTO: CLEAR
GLUCOSE UR STRIP.AUTO-MCNC: NORMAL MG/DL
KETONES UR STRIP.AUTO-MCNC: NEGATIVE MG/DL
LEUKOCYTE ESTERASE UR QL STRIP.AUTO: 500
NITRITE UR QL STRIP.AUTO: NEGATIVE
PH UR STRIP.AUTO: 5.5 [PH] (ref 5–8)
PROT UR STRIP.AUTO-MCNC: NEGATIVE MG/DL
RBC UR QL AUTO: NEGATIVE
SP GR UR STRIP.AUTO: 1.01 (ref 1–1.03)
UROBILINOGEN UR STRIP.AUTO-MCNC: NORMAL MG/DL

## 2025-04-14 ENCOUNTER — LAB REQUISITION (OUTPATIENT)
Dept: LAB | Facility: HOSPITAL | Age: OVER 89
End: 2025-04-14
Payer: MEDICARE

## 2025-04-14 DIAGNOSIS — Z00.00 ENCOUNTER FOR GENERAL ADULT MEDICAL EXAMINATION WITHOUT ABNORMAL FINDINGS: ICD-10-CM

## 2025-04-14 LAB
ALBUMIN SERPL-MCNC: 3.9 G/DL (ref 3.2–4.8)
ALBUMIN/GLOB SERPL: 1.8 {RATIO} (ref 1–2)
ALP LIVER SERPL-CCNC: 62 U/L (ref 55–142)
ALT SERPL-CCNC: 9 U/L (ref 10–49)
ANION GAP SERPL CALC-SCNC: 7 MMOL/L (ref 0–18)
AST SERPL-CCNC: 17 U/L (ref ?–34)
BASOPHILS # BLD AUTO: 0.02 X10(3) UL (ref 0–0.2)
BASOPHILS NFR BLD AUTO: 0.4 %
BILIRUB SERPL-MCNC: 0.6 MG/DL (ref 0.2–0.9)
BUN BLD-MCNC: 24 MG/DL (ref 9–23)
CALCIUM BLD-MCNC: 9.3 MG/DL (ref 8.7–10.6)
CHLORIDE SERPL-SCNC: 106 MMOL/L (ref 98–112)
CO2 SERPL-SCNC: 31 MMOL/L (ref 21–32)
CREAT BLD-MCNC: 1.07 MG/DL (ref 0.55–1.02)
EGFRCR SERPLBLD CKD-EPI 2021: 47 ML/MIN/1.73M2 (ref 60–?)
EOSINOPHIL # BLD AUTO: 0.12 X10(3) UL (ref 0–0.7)
EOSINOPHIL NFR BLD AUTO: 2.3 %
ERYTHROCYTE [DISTWIDTH] IN BLOOD BY AUTOMATED COUNT: 13 %
FASTING STATUS PATIENT QL REPORTED: YES
GLOBULIN PLAS-MCNC: 2.2 G/DL (ref 2–3.5)
GLUCOSE BLD-MCNC: 114 MG/DL (ref 70–99)
HCT VFR BLD AUTO: 39.4 % (ref 35–48)
HGB BLD-MCNC: 12.6 G/DL (ref 12–16)
IMM GRANULOCYTES # BLD AUTO: 0.01 X10(3) UL (ref 0–1)
IMM GRANULOCYTES NFR BLD: 0.2 %
LYMPHOCYTES # BLD AUTO: 1.75 X10(3) UL (ref 1–4)
LYMPHOCYTES NFR BLD AUTO: 33.7 %
MCH RBC QN AUTO: 30.1 PG (ref 26–34)
MCHC RBC AUTO-ENTMCNC: 32 G/DL (ref 31–37)
MCV RBC AUTO: 94 FL (ref 80–100)
MONOCYTES # BLD AUTO: 0.5 X10(3) UL (ref 0.1–1)
MONOCYTES NFR BLD AUTO: 9.6 %
NEUTROPHILS # BLD AUTO: 2.79 X10 (3) UL (ref 1.5–7.7)
NEUTROPHILS # BLD AUTO: 2.79 X10(3) UL (ref 1.5–7.7)
NEUTROPHILS NFR BLD AUTO: 53.8 %
OSMOLALITY SERPL CALC.SUM OF ELEC: 303 MOSM/KG (ref 275–295)
PLATELET # BLD AUTO: 196 10(3)UL (ref 150–450)
POTASSIUM SERPL-SCNC: 4.7 MMOL/L (ref 3.5–5.1)
PROT SERPL-MCNC: 6.1 G/DL (ref 5.7–8.2)
RBC # BLD AUTO: 4.19 X10(6)UL (ref 3.8–5.3)
SODIUM SERPL-SCNC: 144 MMOL/L (ref 136–145)
WBC # BLD AUTO: 5.2 X10(3) UL (ref 4–11)

## 2025-04-14 PROCEDURE — 80053 COMPREHEN METABOLIC PANEL: CPT | Performed by: HOSPITALIST

## 2025-04-14 PROCEDURE — 85025 COMPLETE CBC W/AUTO DIFF WBC: CPT | Performed by: HOSPITALIST

## 2025-06-14 ENCOUNTER — LAB REQUISITION (OUTPATIENT)
Dept: LAB | Facility: HOSPITAL | Age: OVER 89
End: 2025-06-14
Payer: MEDICARE

## 2025-06-14 DIAGNOSIS — Z87.440 PERSONAL HISTORY OF URINARY (TRACT) INFECTIONS: ICD-10-CM

## 2025-06-14 LAB
BILIRUB UR QL STRIP.AUTO: NEGATIVE
CLARITY UR REFRACT.AUTO: CLEAR
COLOR UR AUTO: COLORLESS
GLUCOSE UR STRIP.AUTO-MCNC: NORMAL MG/DL
KETONES UR STRIP.AUTO-MCNC: NEGATIVE MG/DL
LEUKOCYTE ESTERASE UR QL STRIP.AUTO: NEGATIVE
NITRITE UR QL STRIP.AUTO: NEGATIVE
PH UR STRIP.AUTO: 7 [PH] (ref 5–8)
PROT UR STRIP.AUTO-MCNC: NEGATIVE MG/DL
RBC UR QL AUTO: NEGATIVE
SP GR UR STRIP.AUTO: 1.01 (ref 1–1.03)
UROBILINOGEN UR STRIP.AUTO-MCNC: NORMAL MG/DL

## 2025-06-14 PROCEDURE — 81003 URINALYSIS AUTO W/O SCOPE: CPT

## 2025-06-14 PROCEDURE — 81003 URINALYSIS AUTO W/O SCOPE: CPT | Performed by: HOSPITALIST

## 2025-06-16 ENCOUNTER — LAB REQUISITION (OUTPATIENT)
Dept: LAB | Facility: HOSPITAL | Age: OVER 89
End: 2025-06-16
Payer: MEDICARE

## 2025-06-16 DIAGNOSIS — M54.16 RADICULOPATHY, LUMBAR REGION: ICD-10-CM

## 2025-06-16 LAB
ALBUMIN SERPL-MCNC: 4.5 G/DL (ref 3.2–4.8)
ALBUMIN/GLOB SERPL: 1.8 {RATIO} (ref 1–2)
ALP LIVER SERPL-CCNC: 70 U/L (ref 55–142)
ALT SERPL-CCNC: 12 U/L (ref 10–49)
ANION GAP SERPL CALC-SCNC: 10 MMOL/L (ref 0–18)
AST SERPL-CCNC: 18 U/L (ref ?–34)
BASOPHILS # BLD AUTO: 0.02 X10(3) UL (ref 0–0.2)
BASOPHILS NFR BLD AUTO: 0.4 %
BILIRUB SERPL-MCNC: 0.8 MG/DL (ref 0.2–0.9)
BUN BLD-MCNC: 21 MG/DL (ref 9–23)
CALCIUM BLD-MCNC: 9.2 MG/DL (ref 8.7–10.6)
CHLORIDE SERPL-SCNC: 104 MMOL/L (ref 98–112)
CO2 SERPL-SCNC: 30 MMOL/L (ref 21–32)
CREAT BLD-MCNC: 1.2 MG/DL (ref 0.55–1.02)
EGFRCR SERPLBLD CKD-EPI 2021: 41 ML/MIN/1.73M2 (ref 60–?)
EOSINOPHIL # BLD AUTO: 0.11 X10(3) UL (ref 0–0.7)
EOSINOPHIL NFR BLD AUTO: 1.9 %
ERYTHROCYTE [DISTWIDTH] IN BLOOD BY AUTOMATED COUNT: 13.7 %
EST. AVERAGE GLUCOSE BLD GHB EST-MCNC: 143 MG/DL (ref 68–126)
FASTING STATUS PATIENT QL REPORTED: YES
GLOBULIN PLAS-MCNC: 2.5 G/DL (ref 2–3.5)
GLUCOSE BLD-MCNC: 114 MG/DL (ref 70–99)
HBA1C MFR BLD: 6.6 % (ref ?–5.7)
HCT VFR BLD AUTO: 43 % (ref 35–48)
HGB BLD-MCNC: 13.7 G/DL (ref 12–16)
IMM GRANULOCYTES # BLD AUTO: 0.02 X10(3) UL (ref 0–1)
IMM GRANULOCYTES NFR BLD: 0.4 %
LYMPHOCYTES # BLD AUTO: 2.14 X10(3) UL (ref 1–4)
LYMPHOCYTES NFR BLD AUTO: 37.7 %
MCH RBC QN AUTO: 29.7 PG (ref 26–34)
MCHC RBC AUTO-ENTMCNC: 31.9 G/DL (ref 31–37)
MCV RBC AUTO: 93.1 FL (ref 80–100)
MONOCYTES # BLD AUTO: 0.47 X10(3) UL (ref 0.1–1)
MONOCYTES NFR BLD AUTO: 8.3 %
NEUTROPHILS # BLD AUTO: 2.92 X10 (3) UL (ref 1.5–7.7)
NEUTROPHILS # BLD AUTO: 2.92 X10(3) UL (ref 1.5–7.7)
NEUTROPHILS NFR BLD AUTO: 51.3 %
OSMOLALITY SERPL CALC.SUM OF ELEC: 302 MOSM/KG (ref 275–295)
PLATELET # BLD AUTO: 217 10(3)UL (ref 150–450)
POTASSIUM SERPL-SCNC: 4.2 MMOL/L (ref 3.5–5.1)
PROT SERPL-MCNC: 7 G/DL (ref 5.7–8.2)
RBC # BLD AUTO: 4.62 X10(6)UL (ref 3.8–5.3)
SODIUM SERPL-SCNC: 144 MMOL/L (ref 136–145)
TSI SER-ACNC: 1.49 UIU/ML (ref 0.55–4.78)
VIT D+METAB SERPL-MCNC: 25.8 NG/ML (ref 30–100)
WBC # BLD AUTO: 5.7 X10(3) UL (ref 4–11)

## 2025-06-16 PROCEDURE — 84443 ASSAY THYROID STIM HORMONE: CPT | Performed by: HOSPITALIST

## 2025-06-16 PROCEDURE — 82306 VITAMIN D 25 HYDROXY: CPT | Performed by: HOSPITALIST

## 2025-06-16 PROCEDURE — 80053 COMPREHEN METABOLIC PANEL: CPT | Performed by: HOSPITALIST

## 2025-06-16 PROCEDURE — 85025 COMPLETE CBC W/AUTO DIFF WBC: CPT | Performed by: HOSPITALIST

## 2025-06-16 PROCEDURE — 83036 HEMOGLOBIN GLYCOSYLATED A1C: CPT | Performed by: HOSPITALIST

## (undated) DIAGNOSIS — G63 NEUROPATHY ASSOCIATED WITH LYMPHOMA (HCC): ICD-10-CM

## (undated) DIAGNOSIS — C85.90 NEUROPATHY ASSOCIATED WITH LYMPHOMA (HCC): ICD-10-CM

## (undated) NOTE — LETTER
December 27, 2019    Ale Humphrey  [address] [place] [phone]      Dear Angelito Garza: The following are the results of your recent tests. Please review the list of test results.   Your result is the value on the left; we have al

## (undated) NOTE — MR AVS SNAPSHOT
[facility]  [address] [place] [place] [address] [phone]               Thank you for choosing us for your health care visit with Bull Albright MD.  We are glad to serve you and happy to provide you with this Assoc Dx:  Weight loss, abnormal [R63.4]                 Reason for Today's Visit     Physical-Other     Weight Loss     Cholesterol     Lab           Medical Issues Discussed Today     B12 deficiency    Complete tear of right rotator cuff    Essential hy GLUCOSE (mg/dL)   Date Value   07/11/2016 69*   01/10/2014 109*   ----------    Cardiovascular Disease Screening     LDL Annually LDL CHOLESTEROL (mg/dL)   Date Value   05/26/2015 57     LDL CHOLESTROL (mg/dL)   Date Value   01/08/2014 65        EKG - w/ I Today's Vital Signs     BP Pulse Temp Height Weight BMI    132/70 mmHg 68 98.1 °F (36.7 °C) (Tympanic) 68\" 169 lb 25.70 kg/m2         Current Medications          This list is accurate as of: 3/10/17 11:35 AM.  Always use your most recent med li

## (undated) NOTE — LETTER
July 23, 2019    Vahid Climes  [address] [place] [id number]      Dear Jigar Necessary: The following are the results of your recent tests. Please review the list of test results.   Your result is the value on the left; we have also suppli

## (undated) NOTE — LETTER
May 11, 2021    Wilbert Spann  [address] [place] [phone]      Dear Rodrigo Harris: The following are the results of your recent tests. Please review the list of test results.   Your result is the value on the left; we have also kat Lymphocyte Absolute 1.77 1.00 - 4.00 x10(3) uL    Monocyte Absolute 0.40 0.10 - 1.00 x10(3) uL    Eosinophil Absolute 0.06 0.00 - 0.70 x10(3) uL    Basophil Absolute 0.02 0.00 - 0.20 x10(3) uL    Immature Granulocyte Absolute 0.02 0.00 - 1.00 x10(3) uL

## (undated) NOTE — LETTER
November 2, 2020    Jorge Gregg  [address] [place] [phone]      Dear Marvin Mon: The following are the results of your recent tests. Please review the list of test results.   Your result is the value on the left; we have als

## (undated) NOTE — LETTER
April 19, 2018    Art General  [address] [place] [id number]      Dear Gabe Alfred: The following are the results of your recent tests. Please review the list of test results.   Your result is the value on the left; we have also suppl Basophil Absolute 0.03 0.00 - 0.10 x10(3) uL   Immature Granulocyte Absolute 0.02 0.00 - 1.00 x10(3) uL   Neutrophil % 57.4 %   Lymphocyte % 33.5 %   Monocyte % 7.1 %   Eosinophil % 1.3 %   Basophil % 0.4 %   Immature Granulocyte % 0.3 %       Please call

## (undated) NOTE — Clinical Note
March 13, 2017    Ashley Sommers  [address] [facility]      Dear Erick Riley: The following are the results of your recent tests. Please review the list of test results.   Your result is the value on the left; we have also suppl MCV 92.8 81.0-100.0 fL   MCH 30.3 27.0-33.2 pg   MCHC 32.6 31.0-37.0 g/dL   RDW 13.1 11.5-16.0 %   RDW-SD 44.1 35.1-46.3 fL   Neutrophil Absolute Prelim 4.28 1.30-6.70 x10 (3) uL   Neutrophil Absolute 4.28 1.30-6.70 x10(3) uL   Lymphocyte Absolute 1.80 0.9

## (undated) NOTE — LETTER
September 1, 2021    Monico Barboza  [address] [place] [phone]      Dear Simone Zarate: The following are the results of your recent tests. Please review the list of test results.   Your result is the value on the left; we have al

## (undated) NOTE — LETTER
12/30/19        Luana Kraus  [address] [place] [phone]      Dear Angelito Garza,    Our records indicate that you have outstanding lab work and or testing that was ordered for you and has not yet been completed:  Treva Qiu

## (undated) NOTE — LETTER
December 26, 2018    Jaycob Clifford  2004 [address] [facility]      Dear Jackie Oseguera: The following are the results of your recent tests. Please review the list of test results.   Your result is the value on the left; we have also kat Neutrophil Absolute 4.25 1.30 - 6.70 x10(3) uL    Lymphocyte Absolute 1.83 0.90 - 4.00 x10(3) uL    Monocyte Absolute 0.48 0.10 - 1.00 x10(3) uL    Eosinophil Absolute 0.08 0.00 - 0.30 x10(3) uL    Basophil Absolute 0.03 0.00 - 0.10 x10(3) uL    Immature

## (undated) NOTE — LETTER
February 3, 2021    Cj Gonzalez  [address] [place] [id number]      Dear Ayesha Duarte: The following are the results of your recent tests. Please review the list of test results.   Your result is the value on the left; we have als

## (undated) NOTE — LETTER
October 9, 2017    Mimi Caruso  [address] [place] [address]      Dear Castillo Hicks: The following are the results of your recent tests. Please review the list of test results.   Your result is the value on the left; we have also supp

## (undated) NOTE — IP AVS SNAPSHOT
Patient Demographics     Address  [address]  [address]  [facility] [address] Phone  [phone] (Home)  [phone] (Mobile) E-mail Address  [email or web address]      Patient Contacts     Name Relation Home Work Mobile    abdoulaye trent Daughter   [phone]    Katherin Hopkins Daughter   [phone]      Allergies as of 4/9/2024  Review status set to In Progress on 4/5/2024       Noted Reaction Type Reactions    Zolpidem 04/11/2017    RESTLESSNESS, JITTERY      Code Status Information     Code Status    Not on file        Patient Instructions       Wear cervical collar at all times. Follow up in 3 months with new CT scan of cervical spine. Appointment scheduled with Jose Palma PA-C at [place]. If patient does not want to see me in [place] please call number associated with my office to switch provider. -Neurosurgery    Mod assist of two for OOB.  Last  4/7  Encourage IS     Follow-up Information     Jose Palma PA-C. Schedule an appointment as soon as possible for a visit in 12 week(s).    Specialty: Physician Assistant  Why: For wound re-check  Contact information:  [address] [facility] [address]  [facility] [address] [phone]                        Your Home Meds List      TAKE these medications       Instructions Authorizing Provider Morning Afternoon Evening As Needed   amLODIPine 5 MG Tabs  Commonly known as: Norvasc  Next dose due: Tomorrow morning      Take 1 tablet (5 mg total) by mouth every morning.          atorvastatin 10 MG Tabs  Commonly known as: Lipitor  Next dose due: Bedtime tonight      Take 1 tablet (10 mg total) by mouth nightly.   Slim Guzman         DULoxetine 20 MG Cpep  Commonly known as: Cymbalta  Next dose due: Tomorrow morning      Take 1 capsule (20 mg total) by mouth daily.          gabapentin 600 MG Tabs  Commonly known as: Neurontin  Next dose due: Bedtime tonight      TAKE ONE TABLET BY MOUTH IN MORNING AND NOON AND 2 TABLET IN THE EVENING   Slim  Thomas         lidocaine-menthol 4-1 % Ptch  Next dose due: Tomorrow morning      Place 1 patch onto the skin daily.   Rupa Shah         lisinopril 20 MG Tabs  Commonly known as: Prinivil; Zestril  Next dose due: Tomorrow morning      Take 1 tablet (20 mg total) by mouth daily.          omeprazole 20 MG Cpdr  Commonly known as: PriLOSEC  Next dose due: Tomorrow morning      Take 1 capsule (20 mg total) by mouth daily.   Slim Guzman         PreserVision AREDS Tabs  Next dose due: Tomorrow morning      Take by mouth.          QUEtiapine 25 MG Tabs  Commonly known as: SEROquel  Next dose due: bedtime      Take 1 tablet (25 mg total) by mouth nightly.   Rupa Shah         TYLENOL ARTHRITIS PAIN OR      Take 500 mg by mouth as needed (patient takes 1 tablet every 8 hours as needed for pain).                Where to Get Your Medications      These medications were sent to [facility] [place] [address], [address] [phone], [phone]  [address], [address], [facility] [address]    Phone: [phone]   lidocaine-menthol 4-1 % Ptch  QUEtiapine 25 MG Tabs           385385-A - MAR ACTION REPORT  (last 48 hrs)    ** SITE UNKNOWN **     Order ID Medication Name Action Time Action Reason Comments    [id number] DULoxetine (Cymbalta) DR cap 20 mg 04/08/24 0922 Given      [id number] DULoxetine (Cymbalta) DR cap 20 mg 04/09/24 0831 Given      [id number] QUEtiapine (SEROquel) tab 25 mg 04/08/24 2206 Given      [id number] acetaminophen (Tylenol Extra Strength) tab 500 mg 04/07/24 2009 Given      [id number] acetaminophen (Tylenol Extra Strength) tab 500 mg 04/08/24 2205 Given      [id number] atorvastatin (Lipitor) tab 10 mg 04/07/24 2010 Given      [id number] atorvastatin (Lipitor) tab 10 mg 04/08/24 2205 Given      [id number] furosemide (Lasix) 10 mg/mL injection 20 mg 04/08/24 1511 Given      [id number] gabapentin (Neurontin) tab 1,200 mg 04/07/24 2010 Given      [id number] gabapentin  (Neurontin) tab 1,200 mg 04/08/24 2205 Given      [id number] gabapentin (Neurontin) tab 600 mg 04/08/24 0552 Given      [id number] gabapentin (Neurontin) tab 600 mg 04/08/24 1242 Given      [id number] gabapentin (Neurontin) tab 600 mg 04/09/24 0550 Given      [id number] gabapentin (Neurontin) tab 600 mg 04/09/24 1214 Given      [id number] lisinopril (Prinivil; Zestril) tab 20 mg 04/08/24 0922 Given      [id number] lisinopril (Prinivil; Zestril) tab 20 mg 04/09/24 0831 Given      [id number] melatonin tab 3 mg 04/09/24 0046 Given      [id number] pantoprazole (Protonix) DR tab 20 mg 04/08/24 0551 Given      [id number] pantoprazole (Protonix) DR tab 20 mg 04/09/24 0550 Given            LEFT LOWER ABDOMEN     Order ID Medication Name Action Time Action Reason Comments    [id number] heparin (Porcine) 5000 UNIT/ML injection 5,000 Units 04/07/24 2010 Given      [id number] heparin (Porcine) 5000 UNIT/ML injection 5,000 Units 04/08/24 2216 Given      [id number] heparin (Porcine) 5000 UNIT/ML injection 5,000 Units 04/09/24 0830 Given            LEFT UPPER BACK     Order ID Medication Name Action Time Action Reason Comments    [id number] lidocaine-menthol 4-1 % patch 1 patch 04/08/24 0922 Patch Applied      [id number] lidocaine-menthol 4-1 % patch 1 patch 04/09/24 0829 Patch Applied            RIGHT LOWER ABDOMEN     Order ID Medication Name Action Time Action Reason Comments    [id number] heparin (Porcine) 5000 UNIT/ML injection 5,000 Units 04/08/24 0922 Given              Recent Vital Signs    Flowsheet Row Most Recent Value   /58 Filed at 04/09/2024 0752   Pulse 59 Filed at 04/09/2024 0752   Resp 20 Filed at 04/09/2024 0752   Temp 98.6 °F (37 °C) Filed at 04/09/2024 0752   SpO2 96 % Filed at 04/09/2024 0752      Patient's Most Recent Weight    Flowsheet Row Most Recent Value   Patient Weight 60 kg (132 lb 4.4 oz)      Lab Results Last 24 Hours    No matching results found     Microbiology Results (All)     Procedure Component Value  Units Date/Time    SARS-CoV-2/Flu A and B/RSV by PCR (GeneXpert) [[id number]]  (Normal) Collected: 24    Order Status: Completed Lab Status: Final result Updated: 24    Specimen: Other from Nares      SARS-CoV-2 (COVID-19) - (GeneXpert) Not Detected     Influenza A by PCR Negative     Influenza B by PCR Negative     RSV by PCR Negative    Narrative:      This test is intended for the qualitative detection and differentiation of SARS-CoV-2, influenza A, influenza B, and respiratory syncytial virus (RSV) viral RNA in nasopharyngeal or nares swabs from individuals suspected of respiratory viral infection consistent with COVID-19 by their healthcare provider. Signs and symptoms of respiratory viral infection due to SARS-CoV-2, influenza, and RSV can be similar.    Test performed using the Xpert Xpress SARS-CoV-2/FLU/RSV (real time RT-PCR)  assay on the GeneXpert instrument, [employer], [place] [address].   This test is being used under the Food and Drug Administration's Emergency Use Authorization.    The authorized Fact Sheet for Healthcare Providers for this assay is available upon request from the laboratory.         H&P - H&P Note      H&P signed by Jonathan Hargrove MD at 2024  8:29 AM  Version 3 of 3    Author: Jonathan Hargrove MD Service: — Author Type: Physician    Filed: 2024  8:29 AM Date of Service: 2024  9:13 PM Status: Addendum    : Jonathan Hargrove MD (Physician)    Related Notes: Original Note by Jonathan Hargrove MD (Physician) filed at 2024  8:27 AM         [facility]     Luana Kraus Patient Status:  Emergency    1925 MRN [id number]   Location [facility] EMERGENCY DEPARTMENT Attending Radha Cid,    Hosp Day # 0 PCP No primary care provider on file.     Chief Complaint: weakness    Subjective:    History of Present Illness:     Luana Kraus is a 89 year old female with weakness.     Has had worsening  dizziness last few weeks with occasional falls.  Slid down piece of furniture today and hit left rib area but not head.  No LOC.  However, fell face first in February and has had neck pain since that time.   No fevers, n/v, diarrhea, cough.    History/Other:    Past Medical History:  Past Medical History:   Diagnosis Date    Atypical atrial flutter (HCC) 2015    Bilateral carotid artery disease (HCC) 2015    Cancer (HCC)     Cataract     Essential hypertension 2015    HLD (hyperlipidemia) 2015    Macular degeneration     Osteoarthritis      Past Surgical History:   Past Surgical History:   Procedure Laterality Date          x3    KNEE REPLACEMENT SURGERY  2008    left      Family History:   Family History   Problem Relation Age of Onset    Arthritis Mother        hypertension Social History:    reports that she has never smoked. She has never used smokeless tobacco. She reports that she does not drink alcohol and does not use drugs.     Allergies:   Allergies   Allergen Reactions    Zolpidem RESTLESSNESS and JITTERY       Medications:    No current facility-administered medications on file prior to encounter.     Current Outpatient Medications on File Prior to Encounter   Medication Sig Dispense Refill    amLODIPine 5 MG Oral Tab Take 1 tablet (5 mg total) by mouth every morning.      lisinopril 20 MG Oral Tab Take 1 tablet (20 mg total) by mouth daily.      dilTIAZem HCl 120 MG Oral Tab Take 1 tablet (120 mg total) by mouth daily.      omeprazole 20 MG Oral Capsule Delayed Release Take 1 capsule (20 mg total) by mouth daily. 90 capsule 0    atorvastatin 10 MG Oral Tab Take 1 tablet (10 mg total) by mouth nightly. 90 tablet 3    GABAPENTIN 600 MG Oral Tab TAKE ONE TABLET BY MOUTH IN MORNING AND NOON AND 2 TABLET IN THE EVENING 360 tablet 3    Acetaminophen (TYLENOL ARTHRITIS PAIN OR) Take 500 mg by mouth as needed (patient takes 1 tablet every 8 hours as needed for pain).       Multiple Vitamins-Minerals (PRESERVISION AREDS) Oral Tab Take by mouth.         Review of Systems:   A comprehensive 12 point review of systems was completed.    Pertinent positives and negatives noted in the HPI.    Objective:   Physical Exam:    BP (!) 165/85   Pulse 80   Temp 98.6 °F (37 °C) (Temporal)   Resp 18   Ht 5' 4\" (1.626 m)   Wt 132 lb 4.4 oz (60 kg)   SpO2 96%   BMI 22.71 kg/m²   General: No acute distress, Alert; in c-collar  Respiratory: No rhonchi, no wheezes  Cardiovascular: S1, S2. Regular rate and rhythm  Abdomen: Soft, NT/ND, +BS  Neuro: No new focal deficits  Extremities: No edema      Results:    Labs:      Labs Last 24 Hours:    Recent Labs   Lab 04/05/24  1831   RBC 4.18   HGB 12.5   HCT 39.9   MCV 95.5   MCH 29.9   MCHC 31.3   RDW 13.1   NEPRELIM 6.21   WBC 8.5   .0       Recent Labs   Lab 04/05/24  1836   *   BUN 31*   CREATSERUM 1.43*   EGFRCR 33*   CA 9.2   ALB 3.9      K 4.9      CO2 26.0   ALKPHO 81   AST 18   ALT 20   BILT 0.5   TP 7.3       No results found for: \"PT\", \"INR\"    Recent Labs   Lab 04/05/24  1836   TROPHS 12       No results for input(s): \"TROP\", \"PBNP\" in the last 168 hours.    No results for input(s): \"PCT\" in the last 168 hours.    Imaging: Imaging data reviewed in Epic.    Assessment & Plan:      #recurrent falls; fall precautions; no PT until cleared by neurosurgery; bed rest for now  #Minimally displaced odontoid fracture & Nondisplaced fracture of the posterior arch of C1.   Probably happened in February when she fell face first.  #hx lymphoma  #ckd-near baseline  #hx aflutter  #HTN,HL    Keep c-collar in place; bed rest; neurosurgery to see; fall precautions.    Continue home gabapentin dose for now, but would consider reducing gabapentin dose in near future or slowly tapering, in case this is contributing to gait instability.      Quality:  DVT prophylaxis:  SCDs, Lovenox  Code Status: see chart  Mcclain: NO  Mcclain Duration (in  days): N/A  Central line: NO  Estimated discharge date: tbd    Plan of care discussed with patient and ER MD Jonathan De La Cruz MD    Supplementary Documentation:                                 Electronically signed by Jonathan De La Cruz MD on 2024  8:29 AM     H&P signed by Jonathan De La Cruz MD at 2024  8:27 AM  Version 2 of 3    Author: Jonathan De La Cruz MD Service: — Author Type: Physician    Filed: 2024  8:27 AM Date of Service: 2024  9:13 PM Status: Addendum    : Jonathan De La Cruz MD (Physician)    Related Notes: Addendum by Jonathan De La Cruz MD (Physician) filed at 2024  8:29 AM  Original Note by Jonathan De La Cruz MD (Physician) filed at 2024  9:16 PM         [facility]     Luana Kraus Patient Status:  Emergency    1925 MRN [id number]   Location [facility] EMERGENCY DEPARTMENT Attending Radha Cid,    Hosp Day # 0 PCP No primary care provider on file.     Chief Complaint: weakness    Subjective:    History of Present Illness:     Luana Kraus is a 89 year old female with weakness.     Has had worsening dizziness last few weeks with occasional falls.  Slid down piece of furniture today and hit left rib area but not head.  No LOC.  However, fell face first in February and has had neck pain since that time.   No fevers, n/v, diarrhea, cough.    History/Other:    Past Medical History:  Past Medical History:   Diagnosis Date    Atypical atrial flutter (HCC) 2015    Bilateral carotid artery disease (HCC) 2015    Cancer (HCC)     Cataract     Essential hypertension 2015    HLD (hyperlipidemia) 2015    Macular degeneration     Osteoarthritis      Past Surgical History:   Past Surgical History:   Procedure Laterality Date          x3    KNEE REPLACEMENT SURGERY  2008    left      Family History:   Family History   Problem Relation Age of Onset    Arthritis Mother        hypertension Social History:     reports that she has never smoked. She has never used smokeless tobacco. She reports that she does not drink alcohol and does not use drugs.     Allergies:   Allergies   Allergen Reactions    Zolpidem RESTLESSNESS and JITTERY       Medications:    No current facility-administered medications on file prior to encounter.     Current Outpatient Medications on File Prior to Encounter   Medication Sig Dispense Refill    amLODIPine 5 MG Oral Tab Take 1 tablet (5 mg total) by mouth every morning.      lisinopril 20 MG Oral Tab Take 1 tablet (20 mg total) by mouth daily.      dilTIAZem HCl 120 MG Oral Tab Take 1 tablet (120 mg total) by mouth daily.      omeprazole 20 MG Oral Capsule Delayed Release Take 1 capsule (20 mg total) by mouth daily. 90 capsule 0    atorvastatin 10 MG Oral Tab Take 1 tablet (10 mg total) by mouth nightly. 90 tablet 3    GABAPENTIN 600 MG Oral Tab TAKE ONE TABLET BY MOUTH IN MORNING AND NOON AND 2 TABLET IN THE EVENING 360 tablet 3    Acetaminophen (TYLENOL ARTHRITIS PAIN OR) Take 500 mg by mouth as needed (patient takes 1 tablet every 8 hours as needed for pain).      Multiple Vitamins-Minerals (PRESERVISION AREDS) Oral Tab Take by mouth.         Review of Systems:   A comprehensive 12 point review of systems was completed.    Pertinent positives and negatives noted in the HPI.    Objective:   Physical Exam:    BP (!) 165/85   Pulse 80   Temp 98.6 °F (37 °C) (Temporal)   Resp 18   Ht 5' 4\" (1.626 m)   Wt 132 lb 4.4 oz (60 kg)   SpO2 96%   BMI 22.71 kg/m²   General: No acute distress, Alert; in c-collar  Respiratory: No rhonchi, no wheezes  Cardiovascular: S1, S2. Regular rate and rhythm  Abdomen: Soft, NT/ND, +BS  Neuro: No new focal deficits  Extremities: No edema      Results:    Labs:      Labs Last 24 Hours:    Recent Labs   Lab 04/05/24  1831   RBC 4.18   HGB 12.5   HCT 39.9   MCV 95.5   MCH 29.9   MCHC 31.3   RDW 13.1   NEPRELIM 6.21   WBC 8.5   .0       Recent Labs   Lab  24  183   *   BUN 31*   CREATSERUM 1.43*   EGFRCR 33*   CA 9.2   ALB 3.9      K 4.9      CO2 26.0   ALKPHO 81   AST 18   ALT 20   BILT 0.5   TP 7.3       No results found for: \"PT\", \"INR\"    Recent Labs   Lab 24  1836   TROPHS 12       No results for input(s): \"TROP\", \"PBNP\" in the last 168 hours.    No results for input(s): \"PCT\" in the last 168 hours.    Imaging: Imaging data reviewed in Epic.    Assessment & Plan:      #recurrent falls; fall precautions; no PT until cleared by neurosurgery; bed rest for now  #Minimally displaced odontoid fracture & Nondisplaced fracture of the posterior arch of C1.   Probably happened in February when she fell face first.  #hx lymphoma  #ckd-near baseline  #hx aflutter  #HTN,HL    Keep c-collar in place; bed rest; neurosurgery to see; fall precautions.    Would consider reducing gabapentin, in case this is contributing to gait instability.      Quality:  DVT prophylaxis:  SCDs, Lovenox  Code Status: see chart  Mcclain: NO  Mcclain Duration (in days): N/A  Central line: NO  Estimated discharge date: tbd    Plan of care discussed with patient and ER MD Jonathan Hargrove MD    Supplementary Documentation:                                 Electronically signed by Jonathan Hargrove MD on 2024  8:27 AM     H&P signed by Jonathan Hargrove MD at 2024  9:16 PM  Version 1 of 3    Author: Jonathan Hargrove MD Service: — Author Type: Physician    Filed: 2024  9:16 PM Date of Service: 2024  9:13 PM Status: Signed    : Jonathan Hargrove MD (Physician)    Related Notes: Addendum by Jonathan Hargrove MD (Physician) filed at 2024  8:27 AM         [facility]     Luana Kraus Patient Status:  Emergency    1925 MRN [id number]   Location [facility] EMERGENCY DEPARTMENT Attending Radha Cid, DO   Hosp Day # 0 PCP No primary care provider on file.     Chief Complaint: weakness    Subjective:    History of  Present Illness:     Luana Kraus is a 89 year old female with weakness.     Has had worsening dizziness last few weeks with occasional falls.  Slid down piece of furniture today and hit left rib area but not head.  No LOC.  However, fell face first in February and has had neck pain since that time.   No fevers, n/v, diarrhea, cough.    History/Other:    Past Medical History:  Past Medical History:   Diagnosis Date    Atypical atrial flutter (HCC) 2015    Bilateral carotid artery disease (HCC) 2015    Cancer (HCC)     Cataract     Essential hypertension 2015    HLD (hyperlipidemia) 2015    Macular degeneration     Osteoarthritis      Past Surgical History:   Past Surgical History:   Procedure Laterality Date          x3    KNEE REPLACEMENT SURGERY      left      Family History:   Family History   Problem Relation Age of Onset    Arthritis Mother        hypertension Social History:    reports that she has never smoked. She has never used smokeless tobacco. She reports that she does not drink alcohol and does not use drugs.     Allergies:   Allergies   Allergen Reactions    Zolpidem RESTLESSNESS and JITTERY       Medications:    No current facility-administered medications on file prior to encounter.     Current Outpatient Medications on File Prior to Encounter   Medication Sig Dispense Refill    amLODIPine 5 MG Oral Tab Take 1 tablet (5 mg total) by mouth every morning.      lisinopril 20 MG Oral Tab Take 1 tablet (20 mg total) by mouth daily.      dilTIAZem HCl 120 MG Oral Tab Take 1 tablet (120 mg total) by mouth daily.      omeprazole 20 MG Oral Capsule Delayed Release Take 1 capsule (20 mg total) by mouth daily. 90 capsule 0    atorvastatin 10 MG Oral Tab Take 1 tablet (10 mg total) by mouth nightly. 90 tablet 3    GABAPENTIN 600 MG Oral Tab TAKE ONE TABLET BY MOUTH IN MORNING AND NOON AND 2 TABLET IN THE EVENING 360 tablet 3    Acetaminophen (TYLENOL ARTHRITIS PAIN OR)  Take 500 mg by mouth as needed (patient takes 1 tablet every 8 hours as needed for pain).      Multiple Vitamins-Minerals (PRESERVISION AREDS) Oral Tab Take by mouth.         Review of Systems:   A comprehensive 12 point review of systems was completed.    Pertinent positives and negatives noted in the HPI.    Objective:   Physical Exam:    BP (!) 165/85   Pulse 80   Temp 98.6 °F (37 °C) (Temporal)   Resp 18   Ht 5' 4\" (1.626 m)   Wt 132 lb 4.4 oz (60 kg)   SpO2 96%   BMI 22.71 kg/m²   General: No acute distress, Alert; in c-collar  Respiratory: No rhonchi, no wheezes  Cardiovascular: S1, S2. Regular rate and rhythm  Abdomen: Soft, NT/ND, +BS  Neuro: No new focal deficits  Extremities: No edema      Results:    Labs:      Labs Last 24 Hours:    Recent Labs   Lab 04/05/24  1831   RBC 4.18   HGB 12.5   HCT 39.9   MCV 95.5   MCH 29.9   MCHC 31.3   RDW 13.1   NEPRELIM 6.21   WBC 8.5   .0       Recent Labs   Lab 04/05/24  1836   *   BUN 31*   CREATSERUM 1.43*   EGFRCR 33*   CA 9.2   ALB 3.9      K 4.9      CO2 26.0   ALKPHO 81   AST 18   ALT 20   BILT 0.5   TP 7.3       No results found for: \"PT\", \"INR\"    Recent Labs   Lab 04/05/24  1836   TROPHS 12       No results for input(s): \"TROP\", \"PBNP\" in the last 168 hours.    No results for input(s): \"PCT\" in the last 168 hours.    Imaging: Imaging data reviewed in Epic.    Assessment & Plan:      #recurrent falls; fall precautions; no PT until cleared by neurosurgery; bed rest for now  #Minimally displaced odontoid fracture & Nondisplaced fracture of the posterior arch of C1.   Probably happened in February when she fell face first.  #hx lymphoma  #ckd-near baseline  #hx aflutter  #HTN,HL    Keep c-collar in place; bed rest; neurosurgery to see; fall precautions.    Will do med rec once review complete.    Quality:  DVT prophylaxis:  SCDs, Lovenox  Code Status: see chart  Mcclain: NO  Mcclain Duration (in days): N/A  Central line: NO  Estimated  discharge date: tbd    Plan of care discussed with patient and ER MD Jonathan De La Cruz MD    Supplementary Documentation:                                 Electronically signed by Jonathan De La Cruz MD on 4/5/2024  9:16 PM           D/C Summary    No notes of this type exist for this encounter.        Physical Therapy Notes (last 72 hours)      Physical Therapy Note signed by Janae Blanton PTA at 4/8/2024 10:55 AM  Version 2 of 2    Author: Janae Blanton PTA Service: Rehab Author Type: Physical Therapy Assistant    Filed: 4/8/2024 10:55 AM Date of Service: 4/8/2024  9:12 AM Status: Addendum    : Janae Blanton PTA (Physical Therapy Assistant)    Related Notes: Original Note by Janae Blanton PTA (Physical Therapy Assistant) filed at 4/8/2024 10:55 AM          PHYSICAL THERAPY TREATMENT NOTE - INPATIENT    Room Number: 385/385-A     Session: 1     Number of Visits to Meet Established Goals: 5    Presenting Problem: closed nondisplaced fracture of first cervical vertebra  Co-Morbidities : Aflutter, HL, macular degeneration, OA    ASSESSMENT   Patient demonstrates limited progress this session, goals  remain in progress.    Patient continues to function below baseline with bed mobility, transfers, and gait.  Contributing factors to remaining limitations include decreased functional strength and impaired coordination.  Next session anticipate patient to progress bed mobility, transfers, and gait.  Physical Therapy will continue to follow patient for duration of hospitalization.    Patient continues to benefit from continued skilled PT services: to promote return to prior level of function and safety with continuous assistance and gradual rehabilitative therapy .    PLAN  PT Treatment Plan: Bed mobility;Body mechanics;Endurance;Energy conservation;Don/doff brace;Family education;Patient education;Gait training;Transfer training;Balance training  Rehab Potential : Good  Frequency (Obs):  3x/week    CURRENT GOALS     Goal #1 Patient is able to demonstrate supine - sit EOB @ level: supervision      Goal #2 Patient is able to demonstrate transfers Sit to/from Stand at assistance level: supervision      Goal #3 Patient is able to ambulate 150 feet with assist device: walker - rolling at assistance level: supervision      Goal #4     Goal #5     Goal #6     Goal Comments: Goals established on 4/6/2024 4/8/2024 all goals ongoing    SUBJECTIVE  \"My neck hurts\"    OBJECTIVE  Precautions: Spine;Cervical brace;Bed/chair alarm (Aspen Collar)    WEIGHT BEARING RESTRICTION                   PAIN ASSESSMENT   Rating: Unable to rate  Location: neck and LBP  Management Techniques: Activity promotion;Body mechanics;Repositioning    BALANCE                                                                                                                       Static Sitting: Fair -  Dynamic Sitting: Poor +           Static Standing: Poor  Dynamic Standing: Poor -    ACTIVITY TOLERANCE                         O2 WALK         AM-PAC '6-Clicks' INPATIENT SHORT FORM - BASIC MOBILITY  How much difficulty does the patient currently have...  Patient Difficulty: Turning over in bed (including adjusting bedclothes, sheets and blankets)?: A Lot   Patient Difficulty: Sitting down on and standing up from a chair with arms (e.g., wheelchair, bedside commode, etc.): A Lot   Patient Difficulty: Moving from lying on back to sitting on the side of the bed?: A Lot   How much help from another person does the patient currently need...   Help from Another: Moving to and from a bed to a chair (including a wheelchair)?: A Lot   Help from Another: Need to walk in hospital room?: A Lot   Help from Another: Climbing 3-5 steps with a railing?: Total       AM-PAC Score:  Raw Score: 11   Approx Degree of Impairment: 72.57%   Standardized Score (AM-PAC Scale): 33.86   CMS Modifier (G-Code): CL    FUNCTIONAL ABILITY STATUS  Gait Assessment    Functional Mobility/Gait Assessment  Gait Assistance: Moderate assistance  Distance (ft): 1 (to BS commode (mod A x2))  Assistive Device: Rolling walker  Pattern: R Flexed knee;L Flexed knee (Fercho knee buckle)    Skilled Therapy Provided  Pt presents in semi sup  Pt required extensive cuing for bed mobility with hand placement and body mechanics  Pt educated on log roll sequencing and spine precautions.   Pt required cuing on body awareness and hand placement when transferring to  commode  Noted fercho knee buckle, requiring L knee to be blocked while standing at commode for tracy care c max A     Pt BP monitored due to pt feeling dizziness when t/f to EOB    Bed Mobility:  Rolling: Mod A x1   Supine<>Sit: Mod A x1   Sit<>Supine: NT     Transfer Mobility:  Sit<>Stand: Mod A x2   Stand<>Sit: Mod A x2   Gait:  Mod A x2 - to BS commode -  (bed>BS commode; commode>BS chair) chair brought up behind pt / B knee buckle  PCT made aware of recs to use lift for safe t/f back to bed     Therapist's Comments: Pt BP monitored 122/60 - taken on R arm      THERAPEUTIC EXERCISES  Lower Extremity Alternating marching  Ankle pumps     Upper Extremity Elbow flex/ext     Position Sitting     Repetitions   10   Sets   1     Patient End of Session: Up in chair;Needs met;Call light within reach;RN aware of session/findings;Alarm set    PT Session Time: 30 minutes  Gait Trainin minutes  Therapeutic Activity: 15 minutes  Therapeutic Exercise: 10 minutes   Neuromuscular Re-education: 0 minutes               Physical Therapy Note signed by Janae Blanton PTA at 2024 10:55 AM  Version 1 of 2    Author: Janae Blanton PTA Service: Rehab Author Type: Physical Therapy Assistant    Filed: 2024 10:55 AM Date of Service: 2024  9:12 AM Status: Signed    : Janae Blanton PTA (Physical Therapy Assistant)    Related Notes: Addendum by Janae Blanton PTA (Physical Therapy Assistant) filed at 2024 10:55  AM          PHYSICAL THERAPY TREATMENT NOTE - INPATIENT    Room Number: 385/385-A     Session: 1     Number of Visits to Meet Established Goals: 5    Presenting Problem: closed nondisplaced fracture of first cervical vertebra  Co-Morbidities : Aflutter, HL, macular degeneration, OA    ASSESSMENT   Patient demonstrates limited progress this session, goals  remain in progress.    Patient continues to function below baseline with bed mobility, transfers, and gait.  Contributing factors to remaining limitations include decreased functional strength and impaired coordination.  Next session anticipate patient to progress bed mobility, transfers, and gait.  Physical Therapy will continue to follow patient for duration of hospitalization.    Patient continues to benefit from continued skilled PT services: to promote return to prior level of function and safety with continuous assistance and gradual rehabilitative therapy .    PLAN  PT Treatment Plan: Bed mobility;Body mechanics;Endurance;Energy conservation;Don/doff brace;Family education;Patient education;Gait training;Transfer training;Balance training  Rehab Potential : Good  Frequency (Obs): 3x/week    CURRENT GOALS     Goal #1 Patient is able to demonstrate supine - sit EOB @ level: supervision      Goal #2 Patient is able to demonstrate transfers Sit to/from Stand at assistance level: supervision      Goal #3 Patient is able to ambulate 150 feet with assist device: walker - rolling at assistance level: supervision      Goal #4     Goal #5     Goal #6     Goal Comments: Goals established on 4/6/2024 4/8/2024 all goals ongoing    SUBJECTIVE  \"My neck hurts\"    OBJECTIVE  Precautions: Spine;Cervical brace;Bed/chair alarm (Aspen Collar)    WEIGHT BEARING RESTRICTION                   PAIN ASSESSMENT   Rating: Unable to rate  Location: neck and LBP  Management Techniques: Activity promotion;Body mechanics;Repositioning    BALANCE                                                                                                                        Static Sitting: Fair -  Dynamic Sitting: Poor +           Static Standing: Poor  Dynamic Standing: Poor -    ACTIVITY TOLERANCE                         O2 WALK         AM-PAC '6-Clicks' INPATIENT SHORT FORM - BASIC MOBILITY  How much difficulty does the patient currently have...  Patient Difficulty: Turning over in bed (including adjusting bedclothes, sheets and blankets)?: A Lot   Patient Difficulty: Sitting down on and standing up from a chair with arms (e.g., wheelchair, bedside commode, etc.): A Lot   Patient Difficulty: Moving from lying on back to sitting on the side of the bed?: A Lot   How much help from another person does the patient currently need...   Help from Another: Moving to and from a bed to a chair (including a wheelchair)?: A Lot   Help from Another: Need to walk in hospital room?: A Lot   Help from Another: Climbing 3-5 steps with a railing?: Total       AM-PAC Score:  Raw Score: 11   Approx Degree of Impairment: 72.57%   Standardized Score (AM-PAC Scale): 33.86   CMS Modifier (G-Code): CL    FUNCTIONAL ABILITY STATUS  Gait Assessment   Functional Mobility/Gait Assessment  Gait Assistance: Moderate assistance  Distance (ft): 1 (to  commode (mod A x2))  Assistive Device: Rolling walker  Pattern: R Flexed knee;L Flexed knee (Fercho knee buckle)    Skilled Therapy Provided  Pt presents in semi sup  Pt required extensive cuing for bed mobility with hand placement and body mechanics  Pt educated on log roll sequencing and spine precautions.   Pt required cuing on body awareness and hand placement when transferring to [facility]  Noted fercho knee buckle, requiring L knee to be blocked while standing at [facility] for tracy care c max A     Pt BP monitored due to pt feeling dizziness when t/f to EOB    Bed Mobility:  Rolling: Mod A x1   Supine<>Sit: Mod A x1   Sit<>Supine: NT     Transfer Mobility:  Sit<>Stand: Mod A x2   Stand<>Sit:  Mod A x2   Gait:  Mod A x2 - to BS commode -  (bed>BS commode; commode>BS chair) chair brought up behind pt  B knee buckle  PCT made aware of recs to use lift for safe t/f back to bed     Therapist's Comments: Pt BP monitored 122/60 - taken on R arm      THERAPEUTIC EXERCISES  Lower Extremity Alternating marching  Ankle pumps     Upper Extremity Elbow flex/ext     Position Sitting     Repetitions   10   Sets   1     Patient End of Session: Up in chair;Needs met;Call light within reach;RN aware of session/findings;Alarm set    PT Session Time: 30 minutes  Gait Trainin minutes  Therapeutic Activity: 15 minutes  Therapeutic Exercise: 10 minutes   Neuromuscular Re-education: 0 minutes               Physical Therapy Note signed by Bere Pham PT at 2024  4:42 PM  Version 1 of 1    Author: Bere Pham PT Service: Rehab Author Type: Physical Therapist    Filed: 2024  4:42 PM Date of Service: 2024  3:27 PM Status: Signed    : Bere Pham PT (Physical Therapist)             PHYSICAL THERAPY EVALUATION - INPATIENT     Room Number: 385/385-A  Evaluation Date: 2024  Type of Evaluation: Initial  Physician Order: PT Eval and Treat    Presenting Problem: closed nondisplaced fracture of first cervical vertebra and L rib fracture  Co-Morbidities : Aflutter, HL, macular degeneration, OA  Reason for Therapy: Mobility Dysfunction and Discharge Planning    PHYSICAL THERAPY ASSESSMENT   Patient is currently functioning below baseline with bed mobility, transfers, gait, stair negotiation, maintaining seated position, and standing prolonged periods.  Prior to admission, patient's baseline is Mod I with ADL, ambulation with RW to dining andrea.  Patient is requiring minimal assist as a result of the following impairments: decreased functional strength, decreased endurance/aerobic capacity, pain, impaired sitting/standing balance, decreased muscular endurance, and difficulty maintaining  precautions.  Physical Therapy will continue to follow for duration of hospitalization.    Patient will benefit from continued skilled PT Services to promote return to prior level of function and safety with continuous assistance and gradual rehabilitative therapy .    PLAN  PT Treatment Plan: Bed mobility;Body mechanics;Endurance;Energy conservation;Don/doff brace;Family education;Patient education;Gait training;Transfer training;Balance training  Rehab Potential : Good  Frequency (Obs): 3x/week  Number of Visits to Meet Established Goals: 5      CURRENT GOALS    Goal #1 Patient is able to demonstrate supine - sit EOB @ level: supervision     Goal #2 Patient is able to demonstrate transfers Sit to/from Stand at assistance level: supervision     Goal #3 Patient is able to ambulate 150 feet with assist device: walker - rolling at assistance level: supervision     Goal #4    Goal #5    Goal #6    Goal Comments: Goals established on 4/6/2024      PHYSICAL THERAPY MEDICAL/SOCIAL HISTORY  History related to current admission: Patient is a 89 year old female admitted on 4/5/2024 from home for s/p sliding out of her chair at home.  Pt diagnosed with minimally displaced odontoid fracture and nondisplaced fracture of the posterior arch of C1, L rib fractures.      HOME SITUATION  Type of Home: Independent living facility ([facility])   Home Layout: One level                Lives With: Staff 24 hours  Drives: No  Patient Owned Equipment: Rolling walker  Patient Regularly Uses: Glasses    Prior Level of [place]: Mod I for ADL and IADL, ambulates with RW to dining andrea.    SUBJECTIVE  Pt is pleasant and cooperative.       OBJECTIVE  Precautions: Spine;Cervical brace;Bed/chair alarm (Aspen Collar)  Fall Risk: High fall risk    WEIGHT BEARING RESTRICTION                   PAIN ASSESSMENT  Rating: Unable to rate  Location: neck pain  Management Techniques: Activity promotion;Relaxation;Repositioning    COGNITION  Overall  Cognitive Status:  WFL - within functional limits    RANGE OF MOTION AND STRENGTH ASSESSMENT  Upper extremity ROM and strength are within functional limits See OT for evaluation    Lower extremity ROM is within functional limits     Lower extremity strength is within functional limits       BALANCE  Static Sitting: Fair  Dynamic Sitting: Fair -  Static Standing: Poor  Dynamic Standing: Poor -    ADDITIONAL TESTS                                    ACTIVITY TOLERANCE  Pulse: 98  Heart Rate Source: Monitor                   O2 WALK  Oxygen Therapy  SPO2% on Room Air at Rest: 98    NEUROLOGICAL FINDINGS                        AM-PAC '6-Clicks' INPATIENT SHORT FORM - BASIC MOBILITY  How much difficulty does the patient currently have...  Patient Difficulty: Turning over in bed (including adjusting bedclothes, sheets and blankets)?: A Lot   Patient Difficulty: Sitting down on and standing up from a chair with arms (e.g., wheelchair, bedside commode, etc.): A Little   Patient Difficulty: Moving from lying on back to sitting on the side of the bed?: A Lot   How much help from another person does the patient currently need...   Help from Another: Moving to and from a bed to a chair (including a wheelchair)?: A Lot   Help from Another: Need to walk in hospital room?: A Little   Help from Another: Climbing 3-5 steps with a railing?: A Lot       AM-PAC Score:  Raw Score: 14   Approx Degree of Impairment: 61.29%   Standardized Score (AM-PAC Scale): 38.1   CMS Modifier (G-Code): CL    FUNCTIONAL ABILITY STATUS  Gait Assessment   Functional Mobility/Gait Assessment  Gait Assistance: Minimum assistance  Distance (ft): 75  Assistive Device: Rolling walker  Pattern: Shuffle    Skilled Therapy Provided     Bed Mobility:  Rolling: Mod A  Supine to sit: Mod A   Sit to supine: NT     Transfer Mobility:  Sit to stand: Min A   Stand to sit: Min A  Gait = Min A with RW    Therapist's Comments: pt lying in bed and agreeable to PT. Vitals  assessed and WNL throughout session. Pt educated and instructed in spine precautions and how these are integrated with functional activities. Pt instructed in proper body mechanics for bed mobility to include log rolling. Pt cued for proper breathing techniques throughout. Pt instructed in proper hand placement and integration of RW with transfers and ambulation. Pt cued for upright posture in standing and during ambulation and to keep RW within [place]. Pt left in chair with all needs in reach, alarm on. RN notified.    Returned for pm session to instructed pt in how to yosef/doff aspen collar and ensure proper fit. Inc time spent on education and discussion with pt regarding the importance of safety awareness, fall precautions, activity level and pacing, and positioning. Pt in understanding. Pt is to wear aspen collar at all times for 3 months.       Exercise/Education Provided:  Bed mobility  Body mechanics  Energy conservation  Functional activity tolerated  Gait training  Transfer training    Patient End of Session: Up in chair;Needs met;Call light within reach;RN aware of session/findings;All patient questions and concerns addressed;Bracing education provided per handout;Alarm set;Family present;Discussed recommendations with /      Patient Evaluation Complexity Level:  History High - 3 or more personal factors and/or co-morbidities   Examination of body systems Moderate - addressing a total of 3 or more elements   Clinical Presentation Low - Stable   Clinical Decision Making Low - Stable       PT Session Time: 45 minutes  Gait Training: 10 minutes  Therapeutic Activity: 20 minutes  Neuromuscular Re-education: 0 minutes  Therapeutic Exercise: 0 minutes                        Occupational Therapy Notes (last 72 hours)      Occupational Therapy Note signed by Ricardo Nielson OT at 4/8/2024  1:49 PM  Version 1 of 1    Author: Ricardo Nielson OT Service: Rehab Author Type: Occupational Therapist     Filed: 4/8/2024  1:49 PM Date of Service: 4/8/2024  8:55 AM Status: Signed    : Ricardo Nielson OT (Occupational Therapist)       OCCUPATIONAL THERAPY TREATMENT NOTE - INPATIENT     Room Number: 385/385-A  Session: 1   Number of Visits to Meet Established Goals: 5    Presenting Problem: Falls; Minimally displaced odontoid fracture & Nondisplaced fracture of the posterior arch of C1; L rib fractures  Prior Level of Function: Pt lives alone at [facility] and is typically MOD I with ADLs and mobility with RW. Pt makes her own simple breakfast and lunch in her apartment and walks to dining andrea for dinner. Pt has cleaning assist but does her own laundry. Pt and daughter endorse at least 3 falls in the last month.     ASSESSMENT   Patient demonstrates fair progress this session, goals remain in progress.    Patient continues to function below baseline with toileting, lower body dressing, bed mobility, transfers, static standing balance, dynamic standing balance, and functional standing tolerance.   Contributing factors to remaining limitations include decreased functional strength, decreased endurance, pain, impaired standing balance, impaired coordination, impaired motor planning, and decreased muscular endurance.  Next session anticipate patient to progress toileting, lower body dressing, bed mobility, transfers, static standing balance, dynamic standing balance, and functional standing tolerance.  Occupational Therapy will continue to follow patient for duration of hospitalization.    Patient continues to benefit from continued skilled OT services: to promote return to prior level of function and safety with continuous assistance and gradual rehabilitative therapy .               History: Patient is a 89 year old female admitted on 4/5/2024 from [facility] [place] for multiple falls. Imaging revealed nondisplaced fracture of posterior arch of C1, which likely happened a few months ago per nsgy and she is to wear  Aspen collar at all times for 3 months. Pt also has multiple left-sided rib fractures, likely from fall yesterday.      Imaging:  CT SPINE CERVICAL  4/5/2024  1. Minimally displaced odontoid fracture. 2. Nondisplaced fracture of the posterior arch of C1.  3. Multilevel degenerative changes of the cervical spine with uncovertebral and facet hypertrophy. 4. Thyromegaly with a left left thyroid lobe nodule measuring approximately 6.6 x 5.0 cm.  Consider ultrasound to further evaluate      CT BRAIN OR HEAD 4/5/2024  1. No acute intracranial findings 2. Cerebral atrophy with chronic microvascular ischemic changes. 3. Nondisplaced fracture of the posterior arch of C1.        XR RIBS WITH CHEST, LEFT 4/5/2024  1. Mildly displaced lateral 7th through 9th rib fractures. 2. Mild interstitial opacities may represent mild edema.     Co-Morbidities : Aflutter, HL, macular degeneration, OA    WEIGHT BEARING RESTRICTION                   Recommendations for nursing staff:   Transfers: ivan mccoy  Toileting location: bedside commode    TREATMENT SESSION:  Patient Start of Session: semi supine in bed  FUNCTIONAL TRANSFER ASSESSMENT  Sit to Stand: Edge of Bed  Edge of Bed: Maximum Assist (mod A x 2)  Toilet Transfer: Not Tested  Commode Transfer: Maximum Assist (mod A x 2)    BED MOBILITY  Rolling: Moderate Assist  Supine to Sit : Moderate Assist  Sit to Supine (OT): Not Tested  Scooting: Min A    BALANCE ASSESSMENT  Static Sitting: Minimal Assist  Sitting Bilateral: Minimal Assist  Static Standing: Moderate Assist  Standing Unilateral: Moderate Assist    FUNCTIONAL ADL ASSESSMENT  Eating: Not Tested  Grooming Seated: Not Tested  LB Dressing Seated: Maximum Assist  LB Dressing Standing: Maximum Assist  Toileting Standing: Maximum Assist      ACTIVITY TOLERANCE: fair                         O2 SATURATIONS       EDUCATION PROVIDED  Patient: Role of Occupational Therapy; Plan of Care; Discharge Recommendations; Functional Transfer  Techniques; Fall Prevention; Surgical Precautions; Posture/Positioning; Energy Conservation; Proper Body Mechanics  Patient's Response to Education: Verbalized Understanding; Requires Further Education; Demonstrates Poor Carry Over to Information  Family/Caregiver: Role of Occupational Therapy; Plan of Care; Adaptive Equipment Recommendations; DME Recommendations; Functional Transfer Techniques; Fall Prevention; Posture/Positioning; Compensatory ADL Techniques; Proper Body Mechanics; Bracing Education Provided  Family/Caregiver's Response to Education: Verbalized Understanding      Equipment used: RW  Demonstrates functional use, Would benefit from additional trial     Therapist comments: Pt requires mod A to sit EOB with max cues provided for log rolling sequence and adherence to cervical spine precautions. Sit to stand transfers performed at mod A x 2 with max cues provided for hand placement and sequencing. Pt transfers to bedside commode with RW requiring mod A x 2 and required max A for toileting hygiene. Pt requires L knee block for tracy-care hygiene in standing. Bedside chair was brought behind pt due to bilat knee buckle and decreased standing tolerance.   Patient End of Session: Up in chair;Needs met;Call light within reach;RN aware of session/findings;All patient questions and concerns addressed;Alarm set    SUBJECTIVE  Pt pleasant and cooperative for OT.    PAIN ASSESSMENT  Rating: 3  Location: lower jaw  Management Techniques: Activity promotion;Body mechanics;Breathing techniques     OBJECTIVE  Precautions: Spine;Cervical brace;Bed/chair alarm (Aspen Collar)    AM-PAC ‘6-Clicks’ Inpatient Daily Activity Short Form  -   Putting on and taking off regular lower body clothing?: A Lot  -   Bathing (including washing, rinsing, drying)?: A Lot  -   Toileting, which includes using toilet, bedpan or urinal? : A Lot  -   Putting on and taking off regular upper body clothing?: A Little  -   Taking care of personal  grooming such as brushing teeth?: None  -   Eating meals?: None    AM-PAC Score:  Score: 17  Approx Degree of Impairment: 50.11%  Standardized Score (AM-PAC Scale): 37.26    PLAN  OT Treatment Plan: Balance activities;Energy conservation/work simplification techniques;ADL training;Functional transfer training;Endurance training;Patient/Family education;Patient/Family training;Equipment eval/education;Compensatory technique education  Rehab Potential : Good  Frequency: 3-5x/week    OT Goals:     All goals ongoing 04/08    ADL Goals   Patient will perform grooming: with stand by assist and while standing at sink  Patient will perform upper body dressing:  with stand by assist  Patient will perform lower body dressing:  with stand by assist and with adaptive equipment PRN  Patient will perform toileting: with stand by assist     Functional Transfer Goals  Patient will transfer from supine to sit:  with stand by assist  Patient will transfer from sit to stand:  with stand by assist  Patient will transfer to toilet:  with supervision     Additional Goals  Pt will don/doff cervical collar with supervision  Pt will verbalize precautions and independently adhere to precautions during ADL tasks    OT Session Time: 25 minutes  Self-Care Home Management: 25 minutes           Occupational Therapy Note signed by Georgia Evans OT at 4/6/2024  2:45 PM  Version 1 of 1    Author: Georgia Evans OT Service: Rehab Author Type: Occupational Therapist    Filed: 4/6/2024  2:45 PM Date of Service: 4/6/2024  2:36 PM Status: Signed    : Georgia Evans OT (Occupational Therapist)       OCCUPATIONAL THERAPY EVALUATION - INPATIENT     Room Number: 385/385-A  Evaluation Date: 4/6/2024  Type of Evaluation: Initial  Presenting Problem: Falls; Minimally displaced odontoid fracture & Nondisplaced fracture of the posterior arch of C1; L rib fractures    Physician Order: IP Consult to Occupational Therapy  Reason for Therapy:  ADL/IADL Dysfunction and Discharge Planning    OCCUPATIONAL THERAPY ASSESSMENT   Patient is currently functioning below baseline with toileting, bathing, lower body dressing, brace management, bed mobility, transfers, static standing balance, dynamic standing balance, and functional standing tolerance. Prior to admission, patient's baseline is MOD I with RW and mobility, Adls, and light IADLs.  Patient is requiring  MIN A for mobility/transfers and SBA to MOD A for ADLs  as a result of the following impairments: decreased functional strength, decreased functional reach, decreased endurance, pain, impaired standing balance, cognitive deficits (dec'd carryover, problem-solving), decreased insight to deficits, and decreased safety awareness. Occupational Therapy will continue to follow for duration of hospitalization.    Patient will benefit from continued skilled OT Services to promote return to prior level of function and safety with continuous assistance and gradual rehabilitative therapy       History Related to Current Admission: Patient is a 89 year old female admitted on 4/5/2024 from [facility] for multiple falls. Imaging revealed nondisplaced fracture of posterior arch of C1, which likely happened a few months ago per nsgy and she is to wear Aspen collar at all times for 3 months. Pt also has multiple left-sided rib fractures, likely from fall yesterday.     Imaging:  CT SPINE CERVICAL  4/5/2024  1. Minimally displaced odontoid fracture. 2. Nondisplaced fracture of the posterior arch of C1.  3. Multilevel degenerative changes of the cervical spine with uncovertebral and facet hypertrophy. 4. Thyromegaly with a left left thyroid lobe nodule measuring approximately 6.6 x 5.0 cm.  Consider ultrasound to further evaluate      CT BRAIN OR HEAD 4/5/2024  1. No acute intracranial findings 2. Cerebral atrophy with chronic microvascular ischemic changes. 3. Nondisplaced fracture of the posterior arch of C1.        XR RIBS  WITH CHEST, LEFT 4/5/2024  1. Mildly displaced lateral 7th through 9th rib fractures. 2. Mild interstitial opacities may represent mild edema.    Co-Morbidities : Aflutter, HL, macular degeneration, OA    WEIGHT BEARING RESTRICTION                   Recommendations for nursing staff:   Transfers: MIN A with RW  Toileting location: toilet - do NOT leave patient    EVALUATION SESSION:  Patient Start of Session: semi-supine   FUNCTIONAL TRANSFER ASSESSMENT  Sit to Stand: Edge of Bed  Edge of Bed: Contact Guard Assist  Toilet Transfer: Minimal Assist    BED MOBILITY  Rolling: Minimal Assist  Supine to Sit : Minimal Assist (Pt with poor understanding and carryover for log rollinh despite step-by-step instructions)  Scooting: SBA    BALANCE ASSESSMENT  Static Sitting: Stand-by Assist  Sitting Bilateral: Minimal Assist (Pt tending to lean backward)  Static Standing: Contact Guard Assist  Standing Unilateral: Minimal Assist    FUNCTIONAL ADL ASSESSMENT  Eating: Supervision  Grooming Seated: Stand-by Assist  LB Dressing Seated: Moderate Assist  LB Dressing Standing: Minimal Assist      ACTIVITY TOLERANCE: Pt on room air and denies SOB, dizziness or lightheadedness throughout session. No significant change in vitals noted.                          O2 SATURATIONS       COGNITION  Arousal/Alertness:  appropriate responses to stimuli  Orientation Level:  oriented x4  Following Commands:  follows one step commands without difficulty and follows multistep commands with increased time  Safety Judgement:  decreased awareness of need for safety  Awareness of Deficits:  decreased awareness of deficits  Problem Solving:  assistance required to identify errors made, assistance required to generate solutions, and assistance required to implement solutions    Upper Extremity   ROM: within functional limits    Coordination  Gross motor: wfl  Fine motor: wfl - noted with some shaking during self-feeding but able to do so without assist    Sensation: Light touch:  intact    EDUCATION PROVIDED  Patient: Role of Occupational Therapy; Plan of Care; Adaptive Equipment Recommendations; Functional Transfer Techniques; DME Recommendations; Fall Prevention; Surgical Precautions; Posture/Positioning; Compensatory ADL Techniques; Proper Body Mechanics; Energy Conservation; Bracing Education Provided  Patient's Response to Education: Verbalized Understanding; Requires Further Education; Demonstrates Poor Carry Over to Information  Family/Caregiver: Role of Occupational Therapy; Plan of Care; Adaptive Equipment Recommendations; DME Recommendations; Functional Transfer Techniques; Fall Prevention; Posture/Positioning; Compensatory ADL Techniques; Proper Body Mechanics; Bracing Education Provided  Family/Caregiver's Response to Education: Verbalized Understanding    Equipment used: RW  Demonstrates functional use, Would benefit from additional trial      Therapist comments: Pt is pleasant and motivated to participate in therapy.  Supportive daughter present and included in education. Pt educated re: log rolling, spinal precautions and integration into Adls and mobility. Pt needs frequent reminders and has difficulty with carryover, I.e.e sequencing log rolling technique. Pt walks with very narrow ROSCOE, nearly scissoring her BLEs, does not appear to have insight into this. She is able to widen ROSCOE with cueing for a few steps, then returns to narrow ROSCOE. Initiated education re: collar management but will need follow up.     Patient End of Session: Up in chair;Needs met;Call light within reach;RN aware of session/findings;All patient questions and concerns addressed;Alarm set;Family present    OCCUPATIONAL PROFILE    HOME SITUATION  Type of Home: Independent living facility ([facility])  Home Layout: One level  Lives With: Staff 24 hours    Toilet and Equipment: Comfort height toilet;Grab bar  Shower/Tub and Equipment: Walk-in shower;Grab bar;Shower chair  Other  Equipment:  (RW)    Occupation/Status: retired     Drives: No  Patient Regularly Uses: Glasses    Prior Level of Function: Pt lives alone at [facility] and is typically MOD I with ADLs and mobility with RW. Pt makes her own simple breakfast and lunch in her apartment and walks to dining andrea for dinner. Pt has cleaning assist but does her own laundry. Pt and daughter endorse at least 3 falls in the last month.     SUBJECTIVE   \"I've always been so independent.\"    PAIN ASSESSMENT  Rating: Unable to rate  Location: neck, L rib area  Management Techniques: Activity promotion;Body mechanics;Breathing techniques    OBJECTIVE  Precautions: Spine;Cervical brace;Bed/chair alarm (Aspen Collar)  Fall Risk: High fall risk      ASSESSMENTS    AM-PAC ‘6-Clicks’ Inpatient Daily Activity Short Form  -   Putting on and taking off regular lower body clothing?: A Lot  -   Bathing (including washing, rinsing, drying)?: A Lot  -   Toileting, which includes using toilet, bedpan or urinal? : A Lot  -   Putting on and taking off regular upper body clothing?: A Little  -   Taking care of personal grooming such as brushing teeth?: None  -   Eating meals?: None    AM-PAC Score:  Score: 17  Approx Degree of Impairment: 50.11%  Standardized Score (AM-PAC Scale): 37.26    ADDITIONAL TESTS     NEUROLOGICAL FINDINGS      COGNITION ASSESSMENTS       PLAN  OT Treatment Plan: Balance activities;Energy conservation/work simplification techniques;ADL training;Functional transfer training;Endurance training;Patient/Family education;Patient/Family training;Equipment eval/education;Compensatory technique education  Rehab Potential : Good  Frequency: 3-5x/week  Number of Visits to Meet Established Goals: 5    ADL Goals   Patient will perform grooming: with stand by assist and while standing at sink  Patient will perform upper body dressing:  with stand by assist  Patient will perform lower body dressing:  with stand by assist and with adaptive  equipment PRN  Patient will perform toileting: with stand by assist    Functional Transfer Goals  Patient will transfer from supine to sit:  with stand by assist  Patient will transfer from sit to stand:  with stand by assist  Patient will transfer to toilet:  with supervision    Additional Goals  Pt will don/doff cervical collar with supervision  Pt will verbalize precautions and independently adhere to precautions during ADL tasks    Patient Evaluation Complexity Level:   Occupational Profile/Medical History LOW - Brief history including review of medical or therapy records    Specific performance deficits impacting engagement in ADL/IADL MODERATE  3 - 5 performance deficits   Client Assessment/Performance Deficits MODERATE - Comorbidities and min to mod modifications of tasks    Clinical Decision Making MODERATE - Analysis of occupational profile, detailed assessments, several treatment options    Overall Complexity LOW     OT Session Time: 24 minutes  Self-Care Home Management: 5 minutes  Therapeutic Activity: 10 minutes                                                       Video Swallow Study Notes    No notes of this type exist for this encounter.     SLP Notes    No notes of this type exist for this encounter.     Immunizations     Name Date      Covid-19 Pfizer 03/04/21     Covid-19 Pfizer 02/11/21     Depo-Medrol 40mg Inj 05/09/16     Fluad 0.5ml 10/04/18     Fluad 0.5ml 10/03/17     INFLUENZA 11/08/22     INFLUENZA 10/04/21     INFLUENZA 10/01/20     INFLUENZA 10/08/19     INFLUENZA 10/08/19     INFLUENZA 10/13/16     INFLUENZA 10/13/16     INFLUENZA 10/20/15     INFLUENZA 10/20/15     INFLUENZA 10/21/14     INFLUENZA 10/21/14     INFLUENZA 10/16/13     INFLUENZA 10/04/12     INFLUENZA 09/21/11     INFLUENZA 10/23/08     INFLUENZA 10/25/07     INFLUENZA 10/26/06     INFLUENZA 10/19/05     INFLUENZA 11/02/04     INFLUENZA 12/03/03     INFLUENZA 11/19/02     INFLUENZA 11/12/02     INFLUENZA 12/05/01      INFLUENZA 10/25/00     INFLUENZA 11/12/99     Methylprednisolone 40 Mg Inj 06/09/16     Pneumococcal (Prevnar 13) 07/27/20     Pneumovax 23 10/27/00     Pneumovax 23 01/03/00       Multidisciplinary Problems     Active Goals        Problem: Patient/Family Goals    Goal Priority Disciplines Outcome Interventions   Patient/Family Long Term Goal     Interdisciplinary Progressing    Description: Patient's Long Term Goal: get stronger    Interventions:  - aspen collar  - PT/OT eval  - See additional Care Plan goals for specific interventions   Patient/Family Short Term Goal     Interdisciplinary Progressing    Description: Patient's Short Term Goal: go back home    Interventions:   - PT/OT eval  - Neuro sx to see  - Aspen collar  - See additional Care Plan goals for specific interventions

## (undated) NOTE — LETTER
June 17, 2020    Bren Leigh  [address] [place] [id number]      Dear Julita Dennis: The following are the results of your recent tests. Please review the list of test results.   Your result is the value on the left; we have also s

## (undated) NOTE — LETTER
August 22, 2017    Art General  2004 [address] [facility]      Dear Gabe Alfred: The following are the results of your recent tests. Please review the list of test results.   Your result is the value on the left; we have also supp

## (undated) NOTE — MR AVS SNAPSHOT
[facility]  [address] [place] Eugenio Donnelly [address] [place] [id number] [phone]               Thank you for choosing us for your health care visit with Feliciano Gomez MD.  We are glad to serve you and happy to provide you with this amoxicillin 500 MG Caps   For dental work   Commonly known as:  AMOXIL           aspirin 81 MG Tabs   Take 81 mg by mouth daily.            CUSTOM MEDICATION   Lidocaine 2%, Prilocaine 2%, Topiramate 2.5%, Meloxicam 0.09%           DilTIAZem HCl ER C

## (undated) NOTE — LETTER
July 10, 2017    Page Cristino  2004 [address] [facility]      Dear Mary Ellen Clements: The following are the results of your recent tests. Please review the list of test results.   Your result is the value on the left; we have also suppli